# Patient Record
Sex: FEMALE | Race: WHITE | Employment: FULL TIME | URBAN - NONMETROPOLITAN AREA
[De-identification: names, ages, dates, MRNs, and addresses within clinical notes are randomized per-mention and may not be internally consistent; named-entity substitution may affect disease eponyms.]

---

## 2017-09-05 ENCOUNTER — TELEPHONE (OUTPATIENT)
Dept: NEUROLOGY | Age: 52
End: 2017-09-05

## 2017-10-03 ENCOUNTER — OFFICE VISIT (OUTPATIENT)
Dept: NEUROLOGY | Age: 52
End: 2017-10-03
Payer: COMMERCIAL

## 2017-10-03 ENCOUNTER — TELEPHONE (OUTPATIENT)
Dept: NEUROLOGY | Age: 52
End: 2017-10-03

## 2017-10-03 VITALS
HEART RATE: 74 BPM | WEIGHT: 201 LBS | DIASTOLIC BLOOD PRESSURE: 81 MMHG | BODY MASS INDEX: 35.61 KG/M2 | OXYGEN SATURATION: 99 % | HEIGHT: 63 IN | SYSTOLIC BLOOD PRESSURE: 125 MMHG

## 2017-10-03 DIAGNOSIS — H65.93 OTITIS MEDIA WITH EFFUSION, BILATERAL: ICD-10-CM

## 2017-10-03 DIAGNOSIS — R42 DIZZINESS: ICD-10-CM

## 2017-10-03 DIAGNOSIS — R55 VASOVAGAL SYNCOPE: Primary | ICD-10-CM

## 2017-10-03 PROBLEM — H65.90 OTITIS MEDIA WITH EFFUSION: Status: ACTIVE | Noted: 2017-10-03

## 2017-10-03 PROCEDURE — 99214 OFFICE O/P EST MOD 30 MIN: CPT | Performed by: PHYSICIAN ASSISTANT

## 2017-10-03 RX ORDER — RANITIDINE 150 MG/1
150 TABLET ORAL 2 TIMES DAILY
Qty: 60 TABLET | Refills: 11 | Status: SHIPPED | OUTPATIENT
Start: 2017-10-03 | End: 2018-11-02 | Stop reason: SDUPTHER

## 2017-10-03 RX ORDER — ATENOLOL 25 MG/1
25 TABLET ORAL DAILY
Qty: 30 TABLET | Refills: 11 | Status: SHIPPED | OUTPATIENT
Start: 2017-10-03 | End: 2018-11-02 | Stop reason: SDUPTHER

## 2017-10-03 NOTE — TELEPHONE ENCOUNTER
Franco Ramirez asked if she could have a prescription for the Claritin 5 mg rather than over the counter. It would be cheaper with her insurance that way. Thanks!   Dhaval Jeong

## 2017-10-03 NOTE — MR AVS SNAPSHOT
After Visit Summary             Nima Hunt   10/3/2017 10:45 AM   Office Visit    Description:  Female : 1965   Provider:  ZIYAD Cordero   Department:  [facility]              Your Follow-Up and Future Appointments         Below is a list of your follow-up and future appointments. This may not be a complete list as you may have made appointments directly with providers that we are not aware of or your providers may have made some for you. Please call your providers to confirm appointments. It is important to keep your appointments. Please bring your current insurance card, photo ID, co-pay, and all medication bottles to your appointment. If self-pay, payment is expected at the time of service. Your To-Do List     Future Appointments Provider Department Dept Phone    10/16/2018 8:30 AM ZIYAD Cordero [facility] Neuro & Sleep [phone]    Please arrive 15 minutes prior to appointment, bring photo ID and insurance card. Information from Your Visit        Department     Name Address Phone Fax    [address] Neuro & Sleep [address]  [place] [phone]      You Were Seen for:         Comments    Vasovagal syncope   [[id number]]         Vital Signs     Blood Pressure Pulse Height Weight Oxygen Saturation Body Mass Index    125/81 74 5' 3\" (1.6 m) 201 lb (91.2 kg) 99% 35.61 kg/m2    Smoking Status                   Never Smoker           Additional Information about your Body Mass Index (BMI)           Your BMI as listed above is considered obese (30 or more). BMI is an estimate of body fat, calculated from your height and weight. The higher your BMI, the greater your risk of heart disease, high blood pressure, type 2 diabetes, stroke, gallstones, arthritis, sleep apnea, and certain cancers. BMI is not perfect. It may overestimate body fat in athletes and people who are more muscular.   Even a small weight loss (between 5 and 10 · Talk to your doctor about any medicines you take. Some medicines may increase the chance of this condition occurring. · If you feel symptoms, lie down with your legs raised. Talk to your doctor about what to do if your symptoms come back. When should you call for help? Call 911 anytime you think you may need emergency care. For example, call if:  · You have symptoms of a heart problem. These may include:  ¨ Chest pain or pressure. ¨ Severe trouble breathing. ¨ A fast or irregular heartbeat. Watch closely for changes in your health, and be sure to contact your doctor if:  · You have more episodes of fainting at home. · You do not get better as expected. Where can you learn more? Go to [email or web address] and sign in to your [employer] account. Enter L754 in the [employer] box to learn more about \"Vasovagal Syncope: Care Instructions. \"     If you do not have an account, please click on the \"Sign Up Now\" link. Current as of: March 20, 2017  Content Version: 11.3  © 2682-5559 [employer]. Care instructions adapted under license by [facility] ([facility]). If you have questions about a medical condition or this instruction, always ask your healthcare professional. [address] any warranty or liability for your use of this information. Today's Medication Changes          These changes are accurate as of: 10/3/17 11:42 AM.  If you have any questions, ask your nurse or doctor. CHANGE how you take these medications           ranitidine 150 MG tablet   Commonly known as:  ZANTAC   Instructions:   Take 1 tablet by mouth 2 times daily   Quantity:  60 tablet   Refills:  11   What changed:  when to take this   Changed by:  ZIYAD Sandy            Where to Get Your Medications      These medications were sent to [facility], [address] [facility] [phone] - [phone]  [id number] [place] [address], [facility] [employer] Think allows you to send messages to your doctor, view your test results, renew your prescriptions, schedule appointments, view visit notes, and more. How Do I Sign Up? 1. In your Internet browser, go to [email or web address]  2. Click on the Sign Up Now link in the Sign In box. You will see the New Member Sign Up page. 3. Enter your Nano Think Access Code exactly as it appears below. You will not need to use this code after youve completed the sign-up process. If you do not sign up before the expiration date, you must request a new code. Nano Think Access Code: [email or web address]  Expires: 12/2/2017 11:42 AM    4. Enter your Social Security Number ([id number]) and Date of Birth (mm/dd/yyyy) as indicated and click Submit. You will be taken to the next sign-up page. 5. Create a Nano Think ID. This will be your Nano Think login ID and cannot be changed, so think of one that is secure and easy to remember. 6. Create a Nano Think password. You can change your password at any time. 7. Enter your Password Reset Question and Answer. This can be used at a later time if you forget your password. 8. Enter your e-mail address. You will receive e-mail notification when new information is available in [address]. 9. Click Sign Up. You can now view your medical record. Additional Information  If you have questions, please contact the physician practice where you receive care. Remember, Nano Think is NOT to be used for urgent needs. For medical emergencies, dial 911. For questions regarding your Nano Think account call [phone]. If you have a clinical question, please call your doctor's office.

## 2017-10-03 NOTE — PROGRESS NOTES
REVIEW OF SYSTEMS    Constitutional: []Fever [x]Sweats []Chills [] Recent Injury   [] Denies all unless marked  HENT:[]Headache  [] Head Injury  [] Sore Throat  [] Ear Pain  [x] Dizziness [] Hearing Loss   [] Denies all unless marked  Spine:  [] Neck pain  [] Back pain  [] Sciaticia  [x] Denies all unless marked  Cardiovascular:[]Chest Pain []Palpitations [] Heart Disease  [x] Denies all unless marked  Pulmonary: []Shortness of Breath []Cough   [x] Denies all unless marked  Gastrointestinal:  []Abdominal Pain  []Blood in Stool  []Diarrhea []Constipation [x]Nausea  []Vomiting  [] Denies all unless marked  Genitourinary:  [] Dysuria [] Frequency  [] Incontinence [] Urgency   [x] Denies all unless marked  Musculoskeletal: [] Arthralgia  [] Myalgias [] Muscle cramps  [x] Muscle twitches   [] Denies all unless marked   Extremities:   [] Pain   [x] Swelling   [] Denies all unless marked  Skin:[] Rash  [] Color Change  [x] Denies all unless marked  Neurological:[] Visual Disturbance [] Double Vision [] Slurred Speech [] Trouble swallowing  [] Vertigo [] Tingling [] Numbness [] Weakness [] Loss of Balance   [] Loss of Consciousness [] Memory Loss  [x] Denies all unless marked  Psychiatric/Behavioral:[] Depression [] Anxiety  [x] Denies all unless marked  Sleep: []  Insomnia [] Sleep Disturbance [] Snoring [] Restless Legs [] Daytime Sleepiness [] Sleep Apnea  [x] Denies all unless marked

## 2017-10-03 NOTE — PROGRESS NOTES
arrhythmia, CAD, CVA, DM, HTN, seizures or TIA. Objective:     Past Medical History:  Past Medical History:   Diagnosis Date    Acne     Asthma     GERD (gastroesophageal reflux disease)     Palpitations     Vasovagal syncope        Past Surgical History:   Procedure Laterality Date    CHOLECYSTECTOMY         Recent Hospitalizations  ·     Significant Injuries  ·     Family History   Problem Relation Age of Onset    Heart Disease Other     Cancer Other        Social History  Social History     Social History    Marital status:      Spouse name: N/A    Number of children: N/A    Years of education: N/A     Occupational History    Not on file. Social History Main Topics    Smoking status: Never Smoker    Smokeless tobacco: Never Used    Alcohol use No    Drug use: No    Sexual activity: Not on file     Other Topics Concern    Not on file     Social History Narrative       Medications:  Current Outpatient Prescriptions   Medication Sig Dispense Refill    atenolol (TENORMIN) 25 MG tablet Take 1 tablet by mouth daily 30 tablet 11    ranitidine (ZANTAC) 150 MG tablet Take 1 tablet by mouth 2 times daily 60 tablet 11    loratadine (CLARITIN REDITABS) 5 MG dissolvable tablet Take 5 mg by mouth every other day      albuterol sulfate (PROAIR RESPICLICK) 900 (90 BASE) MCG/ACT aerosol powder inhalation Inhale 2 puffs into the lungs every 6 hours as needed for Wheezing or Shortness of Breath 1 Inhaler 5     No current facility-administered medications for this visit.         Allergies:  No Known Allergies    REVIEW OF SYSTEMS     Constitutional: []Fever [x]Sweats []Chills [] Recent Injury   [] Denies all unless marked  HENT:[]Headache  [] Head Injury  [] Sore Throat  [] Ear Pain  [x] Dizziness [] Hearing Loss   [] Denies all unless marked  Spine:  [] Neck pain  [] Back pain  [] Sciaticia  [x] Denies all unless marked  Cardiovascular:[]Chest Pain []Palpitations [] Heart Disease  [x] Denies all unless marked  Pulmonary: []Shortness of Breath []Cough   [x] Denies all unless marked  Gastrointestinal:  []Abdominal Pain  []Blood in Stool  []Diarrhea []Constipation [x]Nausea  []Vomiting  [] Denies all unless marked  Genitourinary:  [] Dysuria [] Frequency  [] Incontinence [] Urgency   [x] Denies all unless marked  Musculoskeletal: [] Arthralgia  [] Myalgias [] Muscle cramps  [x] Muscle twitches   [] Denies all unless marked   Extremities:   [] Pain   [x] Swelling   [] Denies all unless marked  Skin:[] Rash  [] Color Change  [x] Denies all unless marked  Neurological:[] Visual Disturbance [] Double Vision [] Slurred Speech [] Trouble swallowing  [] Vertigo [] Tingling [] Numbness [] Weakness [] Loss of Balance   [] Loss of Consciousness [] Memory Loss  [x] Denies all unless marked  Psychiatric/Behavioral:[] Depression [] Anxiety  [x] Denies all unless marked  Sleep: []  Insomnia [] Sleep Disturbance [] Snoring [] Restless Legs [] Daytime Sleepiness [] Sleep Apnea  [x] Denies all unless marked    Examination:  Vitals:  /81  Pulse 74  Ht 5' 3\" (1.6 m)  Wt 201 lb (91.2 kg)  SpO2 99%  BMI 35.61 kg/m2  General appearance:  Appears healthy. Alert; in no acute distress. Pleasant. , alert and cooperative with exam  HEENT:  PERRLA, EOMI and Neck supple with midline trachea  Heart[de-identified]  regular rate and rhythm, S1, S2 normal, no murmur, click, rub or gallop  Lungs:  clear to auscultation bilaterally  Extremities:  extremities normal, atraumatic, no cyanosis or edema  Neurologic:  Extraocular movements are intact without nystagmus. Visual fields are full to confrontation. Facial movements are symmetrical and normal.  Speech is precise. Extremity strength is normal in both uppers and lowers. Deep tendon reflexes are intact and symmetrical.  Rapid alternating movements are unimpaired. Finger-to-nose testing is performed well, without dysmetria.   Gait is normal.    I reviewed the following

## 2017-10-03 NOTE — PATIENT INSTRUCTIONS
emergency care. For example, call if:  · You have symptoms of a heart problem. These may include:  ¨ Chest pain or pressure. ¨ Severe trouble breathing. ¨ A fast or irregular heartbeat. Watch closely for changes in your health, and be sure to contact your doctor if:  · You have more episodes of fainting at home. · You do not get better as expected. Where can you learn more? Go to [email or web address] (Mobile Payments)[email or web address] [email or web address]. org and sign in to your [employer] account. Enter L754 in the [employer] box to learn more about \"Vasovagal Syncope: Care Instructions. \"     If you do not have an account, please click on the \"Sign Up Now\" link. Current as of: March 20, 2017  Content Version: 11.3  © 9547-2069 [employer]. Care instructions adapted under license by [facility] ([facility]). If you have questions about a medical condition or this instruction, always ask your healthcare professional. [place] [address] any warranty or liability for your use of this information.

## 2017-10-04 RX ORDER — FLUTICASONE PROPIONATE 50 MCG
1 SPRAY, SUSPENSION (ML) NASAL DAILY
Qty: 1 BOTTLE | Refills: 11 | Status: SHIPPED | OUTPATIENT
Start: 2017-10-04 | End: 2018-11-02 | Stop reason: SDUPTHER

## 2017-11-03 ENCOUNTER — TELEPHONE (OUTPATIENT)
Dept: NEUROSURGERY | Age: 52
End: 2017-11-03

## 2017-11-03 NOTE — TELEPHONE ENCOUNTER
Patient stated that the medication for dizziness was not working and she feels that it is getting worse. What would you like to do?

## 2017-11-29 ENCOUNTER — APPOINTMENT (OUTPATIENT)
Dept: CT IMAGING | Age: 52
End: 2017-11-29
Payer: COMMERCIAL

## 2017-11-29 ENCOUNTER — HOSPITAL ENCOUNTER (EMERGENCY)
Age: 52
Discharge: HOME | End: 2017-11-29
Attending: EMERGENCY MEDICINE
Payer: COMMERCIAL

## 2017-11-29 ENCOUNTER — APPOINTMENT (OUTPATIENT)
Dept: GENERAL RADIOLOGY | Age: 52
End: 2017-11-29
Payer: COMMERCIAL

## 2017-11-29 VITALS
SYSTOLIC BLOOD PRESSURE: 109 MMHG | BODY MASS INDEX: 35.44 KG/M2 | HEART RATE: 77 BPM | TEMPERATURE: 98 F | DIASTOLIC BLOOD PRESSURE: 47 MMHG | HEIGHT: 63 IN | OXYGEN SATURATION: 95 % | RESPIRATION RATE: 18 BRPM | WEIGHT: 200 LBS

## 2017-11-29 DIAGNOSIS — R42 VERTIGO: Primary | ICD-10-CM

## 2017-11-29 DIAGNOSIS — R42 LIGHTHEADEDNESS: ICD-10-CM

## 2017-11-29 LAB
ALBUMIN SERPL-MCNC: 4.2 G/DL (ref 3.5–5.2)
ALP BLD-CCNC: 52 U/L (ref 35–104)
ALT SERPL-CCNC: 38 U/L (ref 5–33)
ANION GAP SERPL CALCULATED.3IONS-SCNC: 13 MMOL/L (ref 7–19)
AST SERPL-CCNC: 30 U/L (ref 5–32)
BASOPHILS ABSOLUTE: 0.1 K/UL (ref 0–0.2)
BASOPHILS RELATIVE PERCENT: 0.5 % (ref 0–1)
BILIRUB SERPL-MCNC: 0.3 MG/DL (ref 0.2–1.2)
BUN BLDV-MCNC: 14 MG/DL (ref 6–20)
CALCIUM SERPL-MCNC: 9.4 MG/DL (ref 8.6–10)
CHLORIDE BLD-SCNC: 99 MMOL/L (ref 98–111)
CO2: 27 MMOL/L (ref 22–29)
CREAT SERPL-MCNC: 0.6 MG/DL (ref 0.5–0.9)
EOSINOPHILS ABSOLUTE: 0.3 K/UL (ref 0–0.6)
EOSINOPHILS RELATIVE PERCENT: 3 % (ref 0–5)
GFR NON-AFRICAN AMERICAN: >60
GLUCOSE BLD-MCNC: 115 MG/DL (ref 74–109)
HCG QUALITATIVE: NEGATIVE
HCT VFR BLD CALC: 36.7 % (ref 37–47)
HEMOGLOBIN: 11.5 G/DL (ref 12–16)
LYMPHOCYTES ABSOLUTE: 2.6 K/UL (ref 1.1–4.5)
LYMPHOCYTES RELATIVE PERCENT: 23.4 % (ref 20–40)
MCH RBC QN AUTO: 26.3 PG (ref 27–31)
MCHC RBC AUTO-ENTMCNC: 31.3 G/DL (ref 33–37)
MCV RBC AUTO: 84 FL (ref 81–99)
MONOCYTES ABSOLUTE: 1.1 K/UL (ref 0–0.9)
MONOCYTES RELATIVE PERCENT: 10.4 % (ref 0–10)
NEUTROPHILS ABSOLUTE: 6.8 K/UL (ref 1.5–7.5)
NEUTROPHILS RELATIVE PERCENT: 62.2 % (ref 50–65)
PDW BLD-RTO: 14.4 % (ref 11.5–14.5)
PERFORMED ON: NORMAL
PLATELET # BLD: 336 K/UL (ref 130–400)
PMV BLD AUTO: 9 FL (ref 9.4–12.3)
POC TROPONIN I: 0 NG/ML (ref 0–0.08)
POTASSIUM SERPL-SCNC: 4.2 MMOL/L (ref 3.5–5)
RBC # BLD: 4.37 M/UL (ref 4.2–5.4)
SODIUM BLD-SCNC: 139 MMOL/L (ref 136–145)
TOTAL PROTEIN: 7.9 G/DL (ref 6.6–8.7)
WBC # BLD: 11 K/UL (ref 4.8–10.8)

## 2017-11-29 PROCEDURE — 36415 COLL VENOUS BLD VENIPUNCTURE: CPT

## 2017-11-29 PROCEDURE — 84703 CHORIONIC GONADOTROPIN ASSAY: CPT

## 2017-11-29 PROCEDURE — 6370000000 HC RX 637 (ALT 250 FOR IP): Performed by: EMERGENCY MEDICINE

## 2017-11-29 PROCEDURE — 85025 COMPLETE CBC W/AUTO DIFF WBC: CPT

## 2017-11-29 PROCEDURE — 99284 EMERGENCY DEPT VISIT MOD MDM: CPT | Performed by: EMERGENCY MEDICINE

## 2017-11-29 PROCEDURE — 93005 ELECTROCARDIOGRAM TRACING: CPT

## 2017-11-29 PROCEDURE — 70450 CT HEAD/BRAIN W/O DYE: CPT

## 2017-11-29 PROCEDURE — 2580000003 HC RX 258: Performed by: EMERGENCY MEDICINE

## 2017-11-29 PROCEDURE — 99284 EMERGENCY DEPT VISIT MOD MDM: CPT

## 2017-11-29 PROCEDURE — 84484 ASSAY OF TROPONIN QUANT: CPT

## 2017-11-29 PROCEDURE — 71010 XR CHEST PORTABLE: CPT

## 2017-11-29 PROCEDURE — 80053 COMPREHEN METABOLIC PANEL: CPT

## 2017-11-29 RX ORDER — DIAZEPAM 5 MG/1
5 TABLET ORAL ONCE
Status: COMPLETED | OUTPATIENT
Start: 2017-11-29 | End: 2017-11-29

## 2017-11-29 RX ORDER — MECLIZINE HCL 12.5 MG/1
25 TABLET ORAL ONCE
Status: COMPLETED | OUTPATIENT
Start: 2017-11-29 | End: 2017-11-29

## 2017-11-29 RX ORDER — 0.9 % SODIUM CHLORIDE 0.9 %
500 INTRAVENOUS SOLUTION INTRAVENOUS ONCE
Status: COMPLETED | OUTPATIENT
Start: 2017-11-29 | End: 2017-11-29

## 2017-11-29 RX ORDER — MECLIZINE HYDROCHLORIDE 25 MG/1
25 TABLET ORAL 3 TIMES DAILY PRN
Qty: 15 TABLET | Refills: 0 | Status: SHIPPED | OUTPATIENT
Start: 2017-11-29 | End: 2017-12-09

## 2017-11-29 RX ADMIN — SODIUM CHLORIDE 500 ML: 9 INJECTION, SOLUTION INTRAVENOUS at 20:56

## 2017-11-29 RX ADMIN — MECLIZINE 25 MG: 12.5 TABLET ORAL at 20:51

## 2017-11-29 RX ADMIN — DIAZEPAM 5 MG: 5 TABLET ORAL at 20:51

## 2017-11-29 ASSESSMENT — ENCOUNTER SYMPTOMS
NAUSEA: 0
DIARRHEA: 0
ABDOMINAL PAIN: 0
VOMITING: 0
SHORTNESS OF BREATH: 0
BACK PAIN: 0
COUGH: 0
RHINORRHEA: 0
SORE THROAT: 0

## 2017-12-01 LAB
EKG P AXIS: 62 DEGREES
EKG P-R INTERVAL: 194 MS
EKG Q-T INTERVAL: 388 MS
EKG QRS DURATION: 80 MS
EKG QTC CALCULATION (BAZETT): 424 MS
EKG T AXIS: 55 DEGREES

## 2017-12-04 ENCOUNTER — OFFICE VISIT (OUTPATIENT)
Dept: OTOLARYNGOLOGY | Age: 52
End: 2017-12-04
Payer: COMMERCIAL

## 2017-12-04 VITALS
OXYGEN SATURATION: 99 % | HEIGHT: 63 IN | SYSTOLIC BLOOD PRESSURE: 132 MMHG | WEIGHT: 200 LBS | DIASTOLIC BLOOD PRESSURE: 68 MMHG | RESPIRATION RATE: 20 BRPM | TEMPERATURE: 96.5 F | HEART RATE: 80 BPM | BODY MASS INDEX: 35.44 KG/M2

## 2017-12-04 DIAGNOSIS — H93.13 TINNITUS, BILATERAL: Primary | ICD-10-CM

## 2017-12-04 DIAGNOSIS — H91.90 DECREASED HEARING, UNSPECIFIED LATERALITY: ICD-10-CM

## 2017-12-04 DIAGNOSIS — R42 DIZZINESS: ICD-10-CM

## 2017-12-04 DIAGNOSIS — R55 VASOVAGAL SYNDROME: ICD-10-CM

## 2017-12-04 PROCEDURE — 99203 OFFICE O/P NEW LOW 30 MIN: CPT | Performed by: OTOLARYNGOLOGY

## 2017-12-04 NOTE — PROGRESS NOTES
response. Vestibular testing equipment is necessary for assessment of inner ear function and is unavailable at this institution. A trial of vestibular PT to assess efficacy is an option and a neurological consultation or neuro-otology evaluation are other means of assessment. Referral to an institution with a vestibular testing center recommended. Refer to [facility] for vestibular evaluation and further treatment recommendations if warranted. Audio for tinnitus. Review of Systems  A 12 point review of systems was completed, reviewed, and scanned to chart per staff. Patient medical history reviewed. Objective:     Physical Exam   Constitutional: She is oriented to person, place, and time. She appears well-developed and well-nourished. HENT:   Head: Normocephalic and atraumatic. Right Ear: Tympanic membrane, external ear and ear canal normal. No drainage. Decreased hearing is noted. Left Ear: Tympanic membrane, external ear and ear canal normal. No drainage. Decreased hearing is noted. Nose: Nose normal. No mucosal edema, rhinorrhea or septal deviation. Right sinus exhibits no maxillary sinus tenderness and no frontal sinus tenderness. Left sinus exhibits no maxillary sinus tenderness and no frontal sinus tenderness. Mouth/Throat: Uvula is midline and oropharynx is clear and moist. No oral lesions. Eyes: Conjunctivae and EOM are normal. Pupils are equal, round, and reactive to light. Neck: Normal range of motion. Neck supple. No tracheal deviation present. No thyromegaly present. Pulmonary/Chest: No stridor. Lymphadenopathy:     She has no cervical adenopathy. Neurological: She is alert and oriented to person, place, and time. No cranial nerve deficit. Coordination normal.   Psychiatric: She has a normal mood and affect. Her behavior is normal.   Nursing note and vitals reviewed.     /68   Pulse 80   Temp 96.5 °F (35.8 °C) (Temporal)   Resp 20   Ht 5' 3\"

## 2017-12-22 ENCOUNTER — TELEPHONE (OUTPATIENT)
Dept: GASTROENTEROLOGY | Age: 52
End: 2017-12-22

## 2018-01-26 ENCOUNTER — TELEPHONE (OUTPATIENT)
Dept: OTOLARYNGOLOGY | Age: 53
End: 2018-01-26

## 2018-02-02 NOTE — TELEPHONE ENCOUNTER
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

## 2018-02-05 ENCOUNTER — OFFICE VISIT (OUTPATIENT)
Dept: OTOLARYNGOLOGY | Age: 53
End: 2018-02-05
Payer: COMMERCIAL

## 2018-02-05 VITALS
RESPIRATION RATE: 16 BRPM | OXYGEN SATURATION: 99 % | BODY MASS INDEX: 35.08 KG/M2 | WEIGHT: 198 LBS | DIASTOLIC BLOOD PRESSURE: 84 MMHG | HEART RATE: 70 BPM | TEMPERATURE: 98 F | HEIGHT: 63 IN | SYSTOLIC BLOOD PRESSURE: 130 MMHG

## 2018-02-05 DIAGNOSIS — R42 DIZZINESS: ICD-10-CM

## 2018-02-05 DIAGNOSIS — R55 VASOVAGAL SYNDROME: ICD-10-CM

## 2018-02-05 DIAGNOSIS — H93.13 TINNITUS, BILATERAL: Primary | ICD-10-CM

## 2018-02-05 PROCEDURE — 99213 OFFICE O/P EST LOW 20 MIN: CPT | Performed by: OTOLARYNGOLOGY

## 2018-02-07 NOTE — TELEPHONE ENCOUNTER
Patient called wanting refills for atenolol. Called patient she doesn't need a refill. That she just needed to call the pharmacy and let them know she needs a refill.

## 2018-04-23 ENCOUNTER — HOSPITAL ENCOUNTER (OUTPATIENT)
Age: 53
Setting detail: OUTPATIENT SURGERY
Discharge: HOME | End: 2018-04-23
Attending: INTERNAL MEDICINE | Admitting: INTERNAL MEDICINE
Payer: COMMERCIAL

## 2018-04-23 ENCOUNTER — HOSPITAL ENCOUNTER (OUTPATIENT)
Age: 53
Setting detail: SPECIMEN
Discharge: HOME | End: 2018-04-23
Payer: COMMERCIAL

## 2018-04-23 ENCOUNTER — ANESTHESIA (OUTPATIENT)
Dept: OPERATING ROOM | Age: 53
End: 2018-04-23

## 2018-04-23 ENCOUNTER — ANESTHESIA EVENT (OUTPATIENT)
Dept: OPERATING ROOM | Age: 53
End: 2018-04-23

## 2018-04-23 VITALS
HEIGHT: 63 IN | TEMPERATURE: 99 F | SYSTOLIC BLOOD PRESSURE: 131 MMHG | WEIGHT: 200 LBS | DIASTOLIC BLOOD PRESSURE: 68 MMHG | BODY MASS INDEX: 35.44 KG/M2 | RESPIRATION RATE: 18 BRPM | HEART RATE: 64 BPM | OXYGEN SATURATION: 98 %

## 2018-04-23 VITALS — DIASTOLIC BLOOD PRESSURE: 54 MMHG | OXYGEN SATURATION: 99 % | SYSTOLIC BLOOD PRESSURE: 121 MMHG

## 2018-04-23 PROBLEM — Z80.0 FAMILY HISTORY OF COLON CANCER: Status: ACTIVE | Noted: 2018-04-23

## 2018-04-23 PROBLEM — Z12.11 SCREENING FOR COLON CANCER: Status: ACTIVE | Noted: 2018-04-23

## 2018-04-23 PROCEDURE — 45380 COLONOSCOPY AND BIOPSY: CPT

## 2018-04-23 PROCEDURE — 45380 COLONOSCOPY AND BIOPSY: CPT | Performed by: INTERNAL MEDICINE

## 2018-04-23 PROCEDURE — G8918 PT W/O PREOP ORDER IV AB PRO: HCPCS

## 2018-04-23 PROCEDURE — 88305 TISSUE EXAM BY PATHOLOGIST: CPT

## 2018-04-23 PROCEDURE — G8907 PT DOC NO EVENTS ON DISCHARG: HCPCS

## 2018-04-23 RX ORDER — SODIUM CHLORIDE 9 MG/ML
INJECTION, SOLUTION INTRAVENOUS CONTINUOUS
Status: DISCONTINUED | OUTPATIENT
Start: 2018-04-23 | End: 2018-04-23 | Stop reason: HOSPADM

## 2018-04-23 RX ORDER — LIDOCAINE HYDROCHLORIDE 10 MG/ML
INJECTION, SOLUTION EPIDURAL; INFILTRATION; INTRACAUDAL; PERINEURAL PRN
Status: DISCONTINUED | OUTPATIENT
Start: 2018-04-23 | End: 2018-04-23 | Stop reason: SDUPTHER

## 2018-04-23 RX ORDER — PROPOFOL 10 MG/ML
INJECTION, EMULSION INTRAVENOUS PRN
Status: DISCONTINUED | OUTPATIENT
Start: 2018-04-23 | End: 2018-04-23 | Stop reason: SDUPTHER

## 2018-04-23 RX ORDER — LIDOCAINE HYDROCHLORIDE 10 MG/ML
1 INJECTION, SOLUTION EPIDURAL; INFILTRATION; INTRACAUDAL; PERINEURAL
Status: DISCONTINUED | OUTPATIENT
Start: 2018-04-23 | End: 2018-04-23 | Stop reason: HOSPADM

## 2018-04-23 RX ADMIN — LIDOCAINE HYDROCHLORIDE 30 MG: 10 INJECTION, SOLUTION EPIDURAL; INFILTRATION; INTRACAUDAL; PERINEURAL at 08:09

## 2018-04-23 RX ADMIN — PROPOFOL 200 MG: 10 INJECTION, EMULSION INTRAVENOUS at 08:09

## 2018-04-23 RX ADMIN — SODIUM CHLORIDE: 9 INJECTION, SOLUTION INTRAVENOUS at 07:16

## 2018-05-23 PROBLEM — Z12.11 SCREENING FOR COLON CANCER: Status: RESOLVED | Noted: 2018-04-23 | Resolved: 2018-05-23

## 2018-10-08 ENCOUNTER — TELEPHONE (OUTPATIENT)
Dept: NEUROLOGY | Age: 53
End: 2018-10-08

## 2018-11-02 ENCOUNTER — OFFICE VISIT (OUTPATIENT)
Dept: NEUROLOGY | Age: 53
End: 2018-11-02
Payer: COMMERCIAL

## 2018-11-02 VITALS
WEIGHT: 200 LBS | BODY MASS INDEX: 35.44 KG/M2 | HEART RATE: 75 BPM | DIASTOLIC BLOOD PRESSURE: 75 MMHG | HEIGHT: 63 IN | OXYGEN SATURATION: 98 % | SYSTOLIC BLOOD PRESSURE: 134 MMHG

## 2018-11-02 DIAGNOSIS — J45.20 MILD INTERMITTENT ASTHMA WITHOUT COMPLICATION: ICD-10-CM

## 2018-11-02 DIAGNOSIS — K21.9 GASTROESOPHAGEAL REFLUX DISEASE WITHOUT ESOPHAGITIS: ICD-10-CM

## 2018-11-02 DIAGNOSIS — Z91.09 ENVIRONMENTAL ALLERGIES: ICD-10-CM

## 2018-11-02 DIAGNOSIS — R55 VASOVAGAL SYNCOPE: Primary | ICD-10-CM

## 2018-11-02 PROCEDURE — 99213 OFFICE O/P EST LOW 20 MIN: CPT | Performed by: PHYSICIAN ASSISTANT

## 2018-11-02 RX ORDER — ATENOLOL 25 MG/1
25 TABLET ORAL DAILY
Qty: 30 TABLET | Refills: 11 | Status: SHIPPED | OUTPATIENT
Start: 2018-11-02

## 2018-11-02 RX ORDER — FLUTICASONE PROPIONATE 50 MCG
SPRAY, SUSPENSION (ML) NASAL
Qty: 1 BOTTLE | Refills: 11 | Status: SHIPPED | OUTPATIENT
Start: 2018-11-02 | End: 2021-07-19

## 2018-11-02 RX ORDER — RANITIDINE 150 MG/1
150 TABLET ORAL 2 TIMES DAILY
Qty: 60 TABLET | Refills: 11 | Status: SHIPPED | OUTPATIENT
Start: 2018-11-02 | End: 2021-03-30

## 2018-11-02 NOTE — PROGRESS NOTES
Frbsnw-bgncbmbovmcsqb-JY x 2--5 yr recall    TONSILLECTOMY         Recent Hospitalizations  ·     Significant Injuries  ·     Family History   Problem Relation Age of Onset    Heart Disease Other     Cancer Other        Social History     Social History    Marital status:      Spouse name: N/A    Number of children: N/A    Years of education: N/A     Occupational History    Not on file. Social History Main Topics    Smoking status: Never Smoker    Smokeless tobacco: Never Used    Alcohol use No    Drug use: No    Sexual activity: Not on file     Other Topics Concern    Not on file     Social History Narrative    No narrative on file       Medications:  Current Outpatient Prescriptions   Medication Sig Dispense Refill    ranitidine (ZANTAC) 150 MG tablet Take 1 tablet by mouth 2 times daily 60 tablet 11    loratadine (CLARITIN CHILDRENS) 5 MG chewable tablet Take 1 tablet by mouth daily 30 tablet 11    fluticasone (FLONASE) 50 MCG/ACT nasal spray 1 spray q nostril qd 1 Bottle 11    atenolol (TENORMIN) 25 MG tablet Take 1 tablet by mouth daily 30 tablet 11    albuterol sulfate (PROAIR RESPICLICK) 091 (90 Base) MCG/ACT aerosol powder inhalation Inhale 2 puffs into the lungs every 6 hours as needed for Wheezing or Shortness of Breath 1 Inhaler 5     No current facility-administered medications for this visit.         Allergies:  No Known Allergies    REVIEW OF SYSTEMS     Constitutional: []Fever []Sweats []Chills [] Recent Injury   [x] Denies all unless marked  HENT:[]Headache  [] Head Injury  [] Sore Throat  [x] Ear Pain  [x] Dizziness [] Hearing Loss   [x] Denies all unless marked  Spine:  [] Neck pain  [] Back pain  [] Sciaticia  [x] Denies all unless marked  Cardiovascular:[]Chest Pain []Palpitations [] Heart Disease  [x] Denies all unless marked  Pulmonary: []Shortness of Breath []Cough   [x] Denies all unless marked  Gastrointestinal:  []Abdominal Pain  []Blood in Stool  []Diarrhea intact and symmetrical.  Rapid alternating movements are unimpaired. Finger-to-nose testing is performed well, without dysmetria. Gait is normal.      I reviewed the following studies:      []  :  Clinical laboratory test results    []  :  Radiology reports    []  :  Review and summarization of medical records and/or obtain medical records     []  :  Previous/recent polysomnogram report(s)    []  :  [place] Sleepiness Scale       No results found for: [id number]  Lab Results   Component Value Date    WBC 11.0 (H) 11/29/2017    HGB 11.5 (L) 11/29/2017    HCT 36.7 (L) 11/29/2017    MCV 84.0 11/29/2017     11/29/2017     Lab Results   Component Value Date     11/29/2017    K 4.2 11/29/2017    CL 99 11/29/2017    CO2 27 11/29/2017    BUN 14 11/29/2017    CREATININE 0.6 11/29/2017    GLUCOSE 115 (H) 11/29/2017    CALCIUM 9.4 11/29/2017    PROT 7.9 11/29/2017    LABALBU 4.2 11/29/2017    BILITOT 0.3 11/29/2017    ALKPHOS 52 11/29/2017    AST 30 11/29/2017    ALT 38 (H) 11/29/2017    LABGLOM >60 11/29/2017             Assessment:       ICD-10-CM    1. Vasovagal syncope R55    2. Mild intermittent asthma without complication O89.25    3. Environmental allergies Z91.09    4. Gastroesophageal reflux disease without esophagitis K21.9              [x]  :  Stable        []  :  Improved                          []  :  Well controlled                 []  :  Resolving        []  :  Resolved        []  :  Inadequately controlled        []  :  Worsening        []  :  Additional workup planned      Plan:   No orders of the defined types were placed in this encounter.     Orders Placed This Encounter   Medications    ranitidine (ZANTAC) 150 MG tablet     Sig: Take 1 tablet by mouth 2 times daily     Dispense:  60 tablet     Refill:  11    loratadine (CLARITIN CHILDRENS) 5 MG chewable tablet     Sig: Take 1 tablet by mouth daily     Dispense:  30 tablet     Refill:  11    fluticasone (FLONASE) 50 MCG/ACT nasal spray Si spray q nostril qd     Dispense:  1 Bottle     Refill:  11    atenolol (TENORMIN) 25 MG tablet     Sig: Take 1 tablet by mouth daily     Dispense:  30 tablet     Refill:  11    albuterol sulfate (PROAIR RESPICLICK) 962 (90 Base) MCG/ACT aerosol powder inhalation     Sig: Inhale 2 puffs into the lungs every 6 hours as needed for Wheezing or Shortness of Breath     Dispense:  1 Inhaler     Refill:  5         1. The following educational material has been included in this visit after visit summary for your review: vasovagal syncope-  Discussed with the patient and all questions fully answered. 2.  We had a discussion about the clinical issues and went over the various important aspects to consider. Treatment plan discussed. Discussed use, benefit, and SE of prescribed medication. All questions were answered. Pt voiced understanding and agrees with treatment plan. 3.  Refill medications  4. The current medical regimen is effective;  continue present plan and medications. 5.  Consider referral to GI to evaluate GERD; she plans to follow up with Dr. Enedina Romero  6. Follow up in 1 year      Note:  A total of >50% (>8 minutes) of 15 minutes was spent discussing the pathophysiology and treatment and/or coordination of care of the above diagnoses.

## 2018-11-02 NOTE — PATIENT INSTRUCTIONS
Patient Education        Vasovagal Syncope: Care Instructions  Your Care Instructions    Vasovagal syncope (say \"evf-swo-HPHSanya ELAM-kuh-pee\")is sudden dizziness or fainting that can be set off by things such as pain, stress, fear, or trauma. You may sweat or feel lightheaded, sick to your stomach, or tingly. The problem causes the heart rate to slow and the blood vessels to widen, or dilate, for a short time. When this happens, blood pools in the lower body, and less blood goes to the brain. You can usually get relief by lying down with your legs raised (elevated). This helps more blood to flow to your brain and may help relieve symptoms like feeling dizzy. Some doctors may recommend a technique that involves tensing your fists and arms. This type of fainting is often easy to predict. For example, it happens to some people when they see blood or have to get a shot. They may feel symptoms before they faint. An episode of vasovagal syncope usually responds well to self-care. Other treatment often isn't needed. But if the fainting keeps happening, your doctor may suggest further treatments. Follow-up care is a key part of your treatment and safety. Be sure to make and go to all appointments, and call your doctor if you are having problems. It's also a good idea to know your test results and keep a list of the medicines you take. How can you care for yourself at home? · Drink plenty of fluids to prevent dehydration. If you have kidney, heart, or liver disease and have to limit fluids, talk with your doctor before you increase your fluid intake. · Try to avoid things that you think may set off vasovagal syncope. · Talk to your doctor about any medicines you take. Some medicines may increase the chance of this condition occurring. · If you feel symptoms, lie down with your legs raised. Talk to your doctor about what to do if your symptoms come back. When should you call for help?   Call 911 anytime you think you may need emergency care. For example, call if:    · You have symptoms of a heart problem. These may include:  ¨ Chest pain or pressure. ¨ Severe trouble breathing. ¨ A fast or irregular heartbeat.    Watch closely for changes in your health, and be sure to contact your doctor if:    · You have more episodes of fainting at home.     · You do not get better as expected. Where can you learn more? Go to https://Advanced Currents CorporationpeFreshTeb.eMar. org and sign in to your [employer] account. Enter L754 in the [employer] box to learn more about \"Vasovagal Syncope: Care Instructions. \"     If you do not have an account, please click on the \"Sign Up Now\" link. Current as of: November 20, 2017  Content Version: 11.7  © 3854-1584 [employer]. Care instructions adapted under license by [employer]. If you have questions about a medical condition or this instruction, always ask your healthcare professional. [place] [address] any warranty or liability for your use of this information.

## 2020-08-24 NOTE — ED PROVIDER NOTES
(*)     MCHC 31.3 (*)     MPV 9.0 (*)     Monocytes % 10.4 (*)     Monocytes # 1.10 (*)     All other components within normal limits   COMPREHENSIVE METABOLIC PANEL - Abnormal; Notable for the following:     Glucose 115 (*)     ALT 38 (*)     All other components within normal limits   HCG, SERUM, QUALITATIVE   POCT TROPONIN   POCT VENOUS       All other labs were within normal range or not returned as of this dictation. EMERGENCY DEPARTMENT COURSE and DIFFERENTIAL DIAGNOSIS/MDM:   Vitals:    Vitals:    11/29/17 1932 11/29/17 2002 11/29/17 2032 11/29/17 2132   BP: 128/72 125/68 122/73 (!) 109/47   Pulse: 81 85 79 77   Resp:       Temp:       TempSrc:       SpO2:   97% 95%   Weight:       Height:           MDM  Number of Diagnoses or Management Options  Lightheadedness:   Vertigo:      Amount and/or Complexity of Data Reviewed  Clinical lab tests: ordered and reviewed  Tests in the radiology section of CPT®: ordered and reviewed  Independent visualization of images, tracings, or specimens: yes        Vital signs stable, alert oriented ×3, nonfocal neuro exam, some mild left lateral nystagmus on exam, presentation generally seems consistent with likely more peripheral vertigo, lab work so far unremarkable, patient also describes some positional lightheadedness however no chest pain or shortness of breath or palpitations, we'll proceed with checking CT head however low suspicion of central etiology, continue closely monitor    CT head negative, patient feeling better after treatment here, discussed primary care physician follow-up and return precautions, generally feel symptoms related to peripheral vertigo, patient agreeable with plan    CONSULTS:  None    PROCEDURES:  Unless otherwise noted below, none     Procedures    FINAL IMPRESSION      1. Vertigo    2.  Lightheadedness          DISPOSITION/PLAN   DISPOSITION     PATIENT REFERRED TO:  Lydia Anderson, DO  [address]  [address] [facility]

## 2021-03-19 ENCOUNTER — HOSPITAL ENCOUNTER (OUTPATIENT)
Dept: NON INVASIVE DIAGNOSTICS | Age: 56
Discharge: HOME | End: 2021-03-19
Payer: COMMERCIAL

## 2021-03-19 DIAGNOSIS — R00.2 PALPITATIONS: ICD-10-CM

## 2021-03-19 PROCEDURE — 93225 XTRNL ECG REC<48 HRS REC: CPT

## 2021-03-19 PROCEDURE — 93005 ELECTROCARDIOGRAM TRACING: CPT | Performed by: FAMILY MEDICINE

## 2021-03-19 PROCEDURE — 93226 XTRNL ECG REC<48 HR SCAN A/R: CPT

## 2021-03-21 LAB
EKG P AXIS: 66 DEGREES
EKG P-R INTERVAL: 210 MS
EKG Q-T INTERVAL: 414 MS
EKG QRS DURATION: 76 MS
EKG QTC CALCULATION (BAZETT): 426 MS
EKG T AXIS: 45 DEGREES

## 2021-03-21 PROCEDURE — 93010 ELECTROCARDIOGRAM REPORT: CPT | Performed by: INTERNAL MEDICINE

## 2021-03-24 PROCEDURE — 93227 XTRNL ECG REC<48 HR R&I: CPT | Performed by: INTERNAL MEDICINE

## 2021-03-25 NOTE — PROCEDURES
Holter monitor report    Date of recording 3/19/2021  Date of analysis 3/22/2021    Summary impressions:    1. Sinus rhythm minimal heart rate 52 average 69 maximal 101    2. No episodes of ST segment depression were recorded    3. Longest R to R interval 1.3 seconds    4. No ventricular ectopy was recorded    5. Rare supraventricular ectopy 0.4% with 3 runs longest 4 beats at a rate of 162    6. No episodes of atrial fibrillation were recorded    7. 18 seconds tachycardia fastest heart rate recorded 108    8. 49 minutes 9 seconds bradycardia slowest heart rate recorded 52    9.  Activities and symptoms included sitting at the desk working had slight dizziness at 0930 on 3/20/2021

## 2021-03-30 RX ORDER — OMEPRAZOLE 40 MG/1
40 CAPSULE, DELAYED RELEASE ORAL DAILY
COMMUNITY

## 2021-04-02 ENCOUNTER — OFFICE VISIT (OUTPATIENT)
Dept: GASTROENTEROLOGY | Age: 56
End: 2021-04-02
Payer: COMMERCIAL

## 2021-04-02 ENCOUNTER — TELEPHONE (OUTPATIENT)
Dept: GASTROENTEROLOGY | Age: 56
End: 2021-04-02

## 2021-04-02 VITALS
HEART RATE: 76 BPM | WEIGHT: 208 LBS | SYSTOLIC BLOOD PRESSURE: 135 MMHG | OXYGEN SATURATION: 99 % | DIASTOLIC BLOOD PRESSURE: 70 MMHG | HEIGHT: 63 IN | BODY MASS INDEX: 36.86 KG/M2

## 2021-04-02 DIAGNOSIS — R74.01 TRANSAMINITIS: Primary | ICD-10-CM

## 2021-04-02 DIAGNOSIS — R12 HEARTBURN: ICD-10-CM

## 2021-04-02 DIAGNOSIS — R14.2 BELCHING: ICD-10-CM

## 2021-04-02 DIAGNOSIS — K76.0 FATTY LIVER: Primary | ICD-10-CM

## 2021-04-02 DIAGNOSIS — R74.01 TRANSAMINITIS: ICD-10-CM

## 2021-04-02 PROCEDURE — 99214 OFFICE O/P EST MOD 30 MIN: CPT | Performed by: NURSE PRACTITIONER

## 2021-04-02 RX ORDER — FAMOTIDINE 20 MG/1
20 TABLET, FILM COATED ORAL NIGHTLY
Qty: 90 TABLET | Refills: 3 | Status: SHIPPED | OUTPATIENT
Start: 2021-04-02 | End: 2021-10-01

## 2021-04-02 ASSESSMENT — ENCOUNTER SYMPTOMS
TROUBLE SWALLOWING: 0
RECTAL PAIN: 0
ABDOMINAL PAIN: 0
VOMITING: 0
VOICE CHANGE: 0
BLOOD IN STOOL: 0
CONSTIPATION: 0
DIARRHEA: 0
BACK PAIN: 0
NAUSEA: 0
SORE THROAT: 0
COUGH: 0
ANAL BLEEDING: 0
ABDOMINAL DISTENTION: 0
SHORTNESS OF BREATH: 0

## 2021-04-02 NOTE — TELEPHONE ENCOUNTER
----- Message from Aisha Way sent at 4/2/2021  2:56 PM CDT -----  Regarding: CHECKOUT NOTE 4/2/2021  Referral to dietician for fatty liver(appt in late June 2021) PER [facility]

## 2021-04-02 NOTE — TELEPHONE ENCOUNTER
4-2-21    Faxed ref notes to Saym De León [phone]. Once received it will go to review, then pt will be call to schedule. Faxed confirmation attached to this encounter.

## 2021-04-02 NOTE — PROGRESS NOTES
Subjective:      Claudia Chandler is a52 y.o. female  Chief Complaint   Patient presents with    Abnormal Lab     from PCP       HPI  PCP: Alta Escoto DO  Referring Provider: Calixto Alexandre pt referral.  From PCP. For transaminitis. Liver US 3/2021 Radha Hernandez) from PCP notes fatty liver, otherwise normal.  Labs 3/2021:   (<32),  (<33), alk phos normal, total bili normal  Labs 11/2020:  AST 30, ALT normal, alk phos normal, total bili normal  Labs 4/2020:  AST 36, ALT 53, alk phos normal, total bili normal  Pt denies any ETOH use. No familial hx of liver disorders. No recent fatigue or viral illnesses. No herbal supplements. No recent antifungals or antibiotics. Pt reports she has been advised to \"eat healthy\" but she doesn't know what this means and what to eat/avoid in regards to her fatty liver and trying to lose weight. C/o belching. Chronic for >5 years. With acid reflux. Was started on prilosec 40mg daily by her PCP about a year ago. This has helped, but still has breakthrough heartburn almost daily. Has not tried any other PPIs or addition of H2 blockers. Admits that she isnt waiting 30 minutes after taking it before she eats/drinks. No further GI complaints. GI scope reports in history per MA per OV policy, I reviewed this. Family HX:  Maternal grandmother had colon cancer; maternal aunt had colon cancer  Pt denies family hx of colon polyps, inflammatory bowel dx, gastric CA and esophageal CA.     Past Medical History:   Diagnosis Date    Acne     Allergic rhinitis     Asthma     due to allergies    GERD (gastroesophageal reflux disease)     Hypertension     Palpitations     Vasovagal syncope           Past Surgical History:   Procedure Laterality Date    ADENOIDECTOMY      CHOLECYSTECTOMY      COLONOSCOPY      COLONOSCOPY N/A 4/23/2018    Dr HuizarFccaja-ndnnwngbydstah-[facility] x 2--5 yr recall    TONSILLECTOMY         Social History Socioeconomic History    Marital status:      Spouse name: None    Number of children: None    Years of education: None    Highest education level: None   Occupational History    None   Social Needs    Financial resource strain: None    Food insecurity     Worry: None     Inability: None    Transportation needs     Medical: None     Non-medical: None   Tobacco Use    Smoking status: Never Smoker    Smokeless tobacco: Never Used   Substance and Sexual Activity    Alcohol use: No     Alcohol/week: 0.0 standard drinks    Drug use: No    Sexual activity: None   Lifestyle    Physical activity     Days per week: None     Minutes per session: None    Stress: None   Relationships    Social connections     Talks on phone: None     Gets together: None     Attends Mu-ism service: None     Active member of club or organization: None     Attends meetings of clubs or organizations: None     Relationship status: None    Intimate partner violence     Fear of current or ex partner: None     Emotionally abused: None     Physically abused: None     Forced sexual activity: None   Other Topics Concern    None   Social History Narrative    None       No Known Allergies    Current Outpatient Medications   Medication Sig Dispense Refill    famotidine (PEPCID) 20 MG tablet Take 1 tablet by mouth nightly 90 tablet 3    omeprazole (PRILOSEC) 40 MG delayed release capsule Take 40 mg by mouth daily      loratadine (CLARITIN CHILDRENS) 5 MG chewable tablet Take 1 tablet by mouth daily 30 tablet 11    fluticasone (FLONASE) 50 MCG/ACT nasal spray 1 spray q nostril qd 1 Bottle 11    atenolol (TENORMIN) 25 MG tablet Take 1 tablet by mouth daily 30 tablet 11    albuterol sulfate (PROAIR RESPICLICK) 532 (90 Base) MCG/ACT aerosol powder inhalation Inhale 2 puffs into the lungs every 6 hours as needed for Wheezing or Shortness of Breath 1 Inhaler 5     No current facility-administered medications for this visit. Review of Systems   Constitutional: Negative for appetite change, fatigue, fever and unexpected weight change. HENT: Negative for sore throat, trouble swallowing and voice change. Respiratory: Negative for cough and shortness of breath. Cardiovascular: Negative for chest pain, palpitations and leg swelling. Gastrointestinal: Negative for abdominal distention, abdominal pain, anal bleeding, blood in stool, constipation, diarrhea, nausea, rectal pain and vomiting. Genitourinary: Negative for hematuria. Musculoskeletal: Negative for arthralgias, back pain and neck pain. Neurological: Negative for dizziness, weakness, light-headedness and headaches. Psychiatric/Behavioral: Negative for dysphoric mood and sleep disturbance. The patient is not nervous/anxious. All other systems reviewed and are negative. Objective:     Physical Exam  Vitals signs and nursing note reviewed. Constitutional:       Appearance: She is well-developed. Comments: /70   Pulse 76   Ht 5' 3\" (1.6 m)   Wt 208 lb (94.3 kg)   SpO2 99%   BMI 36.85 kg/m²    Eyes:      General: No scleral icterus. Conjunctiva/sclera: Conjunctivae normal.      Pupils: Pupils are equal, round, and reactive to light. Neck:      Musculoskeletal: Normal range of motion and neck supple. Thyroid: No thyromegaly. Cardiovascular:      Rate and Rhythm: Normal rate and regular rhythm. Heart sounds: Normal heart sounds. No murmur. No friction rub. No gallop. Pulmonary:      Effort: Pulmonary effort is normal. No respiratory distress. Breath sounds: Normal breath sounds. Abdominal:      General: Bowel sounds are normal. There is no distension. Palpations: Abdomen is soft. Tenderness: There is no abdominal tenderness. There is no rebound. Musculoskeletal: Normal range of motion. General: No deformity. Neurological:      Mental Status: She is alert and oriented to person, place, and time.

## 2021-04-02 NOTE — PATIENT INSTRUCTIONS
You are going to have an Endoscopy and here are some basic instructions:    Nothing to eat or drink after midnight EXCEPT:  PLEASE TAKE MEDICATION(S) FOR HIGH BLOOD PRESSURE, SEIZURES, HEART, AND THYROID WITH A SIP OF WATER AT LEAST 2 HOURS PRIOR TO ARRIVAL TIME.   YOU MAY ALSO TAKE ANY INHALERS YOU ARE PRESCRIBED. You will not be able to drive for 24 hours after the procedure due to sedation. Bring a  with you the day of the procedure. No aspirin, ibuprofen, naproxen, fish oil or vitamin E for 5 days before procedure. Continue current medications. If you are on blood thinners, clearance from the prescribing physician will be obtained before your procedure is scheduled. Increased [email or web address] may be associated with discontinuation of your blood thinner and include, but not limited to, stroke, TIA, or cardiac event. If biopsies are taken during the procedure they will be sent to a pathologist for analysis. You will be notified by mail of the pathology results in 2-3 weeks. Your physician may also schedule a follow up appointment with the nurse practitioner to discuss pathology, symptoms or to check if you have had any problems related to your procedure. If you prefer not to return to the office after your procedure please discuss this with your physician on the day of your procedure.

## 2021-04-05 ENCOUNTER — TRANSCRIBE ORDERS (OUTPATIENT)
Dept: ADMINISTRATIVE | Facility: HOSPITAL | Age: 56
End: 2021-04-05

## 2021-04-05 DIAGNOSIS — K76.0 FATTY LIVER: Primary | ICD-10-CM

## 2021-04-08 LAB
ALBUMIN SERPL-MCNC: 4.3 G/DL (ref 3.5–5.2)
ALP BLD-CCNC: 71 U/L (ref 35–104)
ALT SERPL-CCNC: 98 U/L (ref 5–33)
ANION GAP SERPL CALCULATED.3IONS-SCNC: 13 MMOL/L (ref 7–19)
AST SERPL-CCNC: 78 U/L (ref 5–32)
BASOPHILS ABSOLUTE: 0 K/UL (ref 0–0.2)
BASOPHILS RELATIVE PERCENT: 0.4 % (ref 0–1)
BILIRUB SERPL-MCNC: 0.3 MG/DL (ref 0.2–1.2)
BILIRUBIN DIRECT: 0.1 MG/DL (ref 0–0.3)
BILIRUBIN, INDIRECT: 0.2 MG/DL (ref 0.1–1)
BUN BLDV-MCNC: 14 MG/DL (ref 6–20)
CALCIUM SERPL-MCNC: 9.3 MG/DL (ref 8.6–10)
CHLORIDE BLD-SCNC: 102 MMOL/L (ref 98–111)
CO2: 26 MMOL/L (ref 22–29)
CREAT SERPL-MCNC: 0.7 MG/DL (ref 0.5–0.9)
EOSINOPHILS ABSOLUTE: 0.2 K/UL (ref 0–0.6)
EOSINOPHILS RELATIVE PERCENT: 1.8 % (ref 0–5)
FERRITIN: 174.6 NG/ML (ref 13–150)
GAMMA GLUTAMYL TRANSFERASE: 23 U/L (ref 7–54)
GFR AFRICAN AMERICAN: >59
GFR NON-AFRICAN AMERICAN: >60
GLUCOSE BLD-MCNC: 136 MG/DL (ref 74–109)
HAV IGM SER IA-ACNC: NORMAL
HCT VFR BLD CALC: 40.6 % (ref 37–47)
HEMOGLOBIN: 12.8 G/DL (ref 12–16)
HEPATITIS B CORE IGM ANTIBODY: NORMAL
HEPATITIS B SURFACE ANTIGEN INTERPRETATION: NORMAL
HEPATITIS C ANTIBODY INTERPRETATION: NORMAL
IMMATURE GRANULOCYTES #: 0.1 K/UL
IRON SATURATION: 14 % (ref 14–50)
IRON: 53 UG/DL (ref 37–145)
LYMPHOCYTES ABSOLUTE: 3.2 K/UL (ref 1.1–4.5)
LYMPHOCYTES RELATIVE PERCENT: 31.5 % (ref 20–40)
MCH RBC QN AUTO: 26.9 PG (ref 27–31)
MCHC RBC AUTO-ENTMCNC: 31.5 G/DL (ref 33–37)
MCV RBC AUTO: 85.3 FL (ref 81–99)
MONOCYTES ABSOLUTE: 1 K/UL (ref 0–0.9)
MONOCYTES RELATIVE PERCENT: 9.6 % (ref 0–10)
NEUTROPHILS ABSOLUTE: 5.7 K/UL (ref 1.5–7.5)
NEUTROPHILS RELATIVE PERCENT: 56.2 % (ref 50–65)
PDW BLD-RTO: 13.5 % (ref 11.5–14.5)
PLATELET # BLD: 301 K/UL (ref 130–400)
PMV BLD AUTO: 9.6 FL (ref 9.4–12.3)
POTASSIUM SERPL-SCNC: 4.2 MMOL/L (ref 3.5–5)
RBC # BLD: 4.76 M/UL (ref 4.2–5.4)
SODIUM BLD-SCNC: 141 MMOL/L (ref 136–145)
TOTAL IRON BINDING CAPACITY: 387 UG/DL (ref 250–400)
TOTAL PROTEIN: 7.7 G/DL (ref 6.6–8.7)
WBC # BLD: 10.1 K/UL (ref 4.8–10.8)

## 2021-04-09 ENCOUNTER — HOSPITAL ENCOUNTER (OUTPATIENT)
Dept: NON INVASIVE DIAGNOSTICS | Age: 56
Discharge: HOME | End: 2021-04-09
Payer: COMMERCIAL

## 2021-04-09 PROCEDURE — 93005 ELECTROCARDIOGRAM TRACING: CPT | Performed by: FAMILY MEDICINE

## 2021-04-10 LAB — ALPHA-1 ANTITRYPSIN: 171 MG/DL (ref 90–200)

## 2021-04-11 LAB
ANTINUCLEAR AB INTERPRETIVE COMMENT: NORMAL
ANTINUCLEAR ANTIBODY, HEP-2, IGG: NORMAL
EKG P AXIS: 62 DEGREES
EKG P-R INTERVAL: 206 MS
EKG Q-T INTERVAL: 408 MS
EKG QRS DURATION: 80 MS
EKG QTC CALCULATION (BAZETT): 427 MS
EKG T AXIS: 41 DEGREES
F-ACTIN AB IGG: 9 UNITS (ref 0–19)
MITOCHONDRIAL M2 AB, IGG: 4.7 UNITS (ref 0–24.9)

## 2021-04-11 PROCEDURE — 93010 ELECTROCARDIOGRAM REPORT: CPT | Performed by: INTERNAL MEDICINE

## 2021-04-13 LAB — CERULOPLASMIN: 29 MG/DL (ref 16–45)

## 2021-04-14 ENCOUNTER — TELEPHONE (OUTPATIENT)
Dept: GASTROENTEROLOGY | Age: 56
End: 2021-04-14

## 2021-04-14 NOTE — TELEPHONE ENCOUNTER
Please let pt know I am reviewing labs as they are coming in. Her liver enzymes have come down quite a bit compared to previous. She has a f/u appt to see me back to discuss results.  Have her get a repeat HFP a few days before her appt so I can see what they are. thanks

## 2021-04-14 NOTE — TELEPHONE ENCOUNTER
4-14-21  Pt has been notified by voicemail.   Person reminder put in to repeat HFP prior to appt on 5-18-21

## 2021-05-10 ENCOUNTER — TELEPHONE (OUTPATIENT)
Dept: GASTROENTEROLOGY | Age: 56
End: 2021-05-10

## 2021-05-10 DIAGNOSIS — R74.01 TRANSAMINITIS: ICD-10-CM

## 2021-05-10 DIAGNOSIS — K76.0 FATTY LIVER: Primary | ICD-10-CM

## 2021-05-12 DIAGNOSIS — R74.01 TRANSAMINITIS: ICD-10-CM

## 2021-05-12 DIAGNOSIS — K76.0 FATTY LIVER: ICD-10-CM

## 2021-05-12 LAB
ALBUMIN SERPL-MCNC: 4.3 G/DL (ref 3.5–5.2)
ALP BLD-CCNC: 64 U/L (ref 35–104)
ALT SERPL-CCNC: 92 U/L (ref 5–33)
AST SERPL-CCNC: 71 U/L (ref 5–32)
BILIRUB SERPL-MCNC: 0.4 MG/DL (ref 0.2–1.2)
BILIRUBIN DIRECT: 0.2 MG/DL (ref 0–0.3)
BILIRUBIN, INDIRECT: 0.2 MG/DL (ref 0.1–1)
TOTAL PROTEIN: 7.9 G/DL (ref 6.6–8.7)

## 2021-05-19 ENCOUNTER — OFFICE VISIT (OUTPATIENT)
Dept: GASTROENTEROLOGY | Age: 56
End: 2021-05-19
Payer: COMMERCIAL

## 2021-05-19 VITALS
OXYGEN SATURATION: 98 % | SYSTOLIC BLOOD PRESSURE: 120 MMHG | HEART RATE: 74 BPM | DIASTOLIC BLOOD PRESSURE: 68 MMHG | WEIGHT: 212.6 LBS | BODY MASS INDEX: 37.67 KG/M2 | HEIGHT: 63 IN

## 2021-05-19 DIAGNOSIS — K76.0 FATTY LIVER: ICD-10-CM

## 2021-05-19 DIAGNOSIS — R74.01 TRANSAMINITIS: Primary | ICD-10-CM

## 2021-05-19 PROCEDURE — 99213 OFFICE O/P EST LOW 20 MIN: CPT | Performed by: NURSE PRACTITIONER

## 2021-05-19 ASSESSMENT — ENCOUNTER SYMPTOMS
TROUBLE SWALLOWING: 0
VOICE CHANGE: 0
DIARRHEA: 0
ABDOMINAL DISTENTION: 0
BLOOD IN STOOL: 0
COUGH: 0
BACK PAIN: 0
NAUSEA: 0
RECTAL PAIN: 0
CONSTIPATION: 0
ANAL BLEEDING: 0
SHORTNESS OF BREATH: 0
VOMITING: 0
ABDOMINAL PAIN: 0

## 2021-05-19 NOTE — PROGRESS NOTES
Subjective:      Tunde Crenshaw is a52 y.o. female  Chief Complaint   Patient presents with    Follow-up       HPI  PCP: Donnie Hansen DO  Pt made a f/u appt. To discuss results of labs for evaluation of transaminitis. Results in epic. Full panel of labs 4/2021 fail to reveal any indication of autoimmune, hereditary, or viral hepatitis. AST and ALT have trended downward. Most recent labs note AST of 92, ALT of 71, total bili normal and alk phos normal.  She has known fatty liver as evidenced by recent liver US at [address] [employer]. She has an appt with a dietician next month to discuss low fat diet and fatty liver. Family HX:  Maternal aunt and maternal grandmother had colon cancer  Pt denies family hx of colon polyps, inflammatory bowel dx, gastric CA and esophageal CA.     Past Medical History:   Diagnosis Date    Acne     Allergic rhinitis     Asthma     due to allergies    GERD (gastroesophageal reflux disease)     Hypertension     Palpitations     Vasovagal syncope           Past Surgical History:   Procedure Laterality Date    ADENOIDECTOMY      CHOLECYSTECTOMY      COLONOSCOPY      COLONOSCOPY N/A 4/23/2018    Dr HuizarSbvrwy-yezncauikrhaeg-[facility] x 2--5 yr recall    TONSILLECTOMY         Social History     Socioeconomic History    Marital status:      Spouse name: None    Number of children: None    Years of education: None    Highest education level: None   Occupational History    None   Tobacco Use    Smoking status: Never Smoker    Smokeless tobacco: Never Used   Vaping Use    Vaping Use: Never used   Substance and Sexual Activity    Alcohol use: No     Alcohol/week: 0.0 standard drinks    Drug use: No    Sexual activity: None   Other Topics Concern    None   Social History Narrative    None     Social Determinants of Health     Financial Resource Strain:     Difficulty of Paying Living Expenses:    Food Insecurity:     Worried About Running Out of Food in the Last Year:  Ran Out of Food in the Last Year:    Transportation Needs:     Lack of Transportation (Medical):  Lack of Transportation (Non-Medical):    Physical Activity:     Days of Exercise per Week:     Minutes of Exercise per Session:    Stress:     Feeling of Stress :    Social Connections:     Frequency of Communication with Friends and Family:     Frequency of Social Gatherings with Friends and Family:     Attends Yarsani Services:     Active Member of Clubs or Organizations:     Attends Club or Organization Meetings:     Marital Status:    Intimate Partner Violence:     Fear of Current or Ex-Partner:     Emotionally Abused:     Physically Abused:     Sexually Abused:        No Known Allergies    Current Outpatient Medications   Medication Sig Dispense Refill    famotidine (PEPCID) 20 MG tablet Take 1 tablet by mouth nightly 90 tablet 3    omeprazole (PRILOSEC) 40 MG delayed release capsule Take 40 mg by mouth daily      loratadine (CLARITIN CHILDRENS) 5 MG chewable tablet Take 1 tablet by mouth daily 30 tablet 11    atenolol (TENORMIN) 25 MG tablet Take 1 tablet by mouth daily 30 tablet 11    albuterol sulfate (PROAIR RESPICLICK) 816 (90 Base) MCG/ACT aerosol powder inhalation Inhale 2 puffs into the lungs every 6 hours as needed for Wheezing or Shortness of Breath 1 Inhaler 5    fluticasone (FLONASE) 50 MCG/ACT nasal spray 1 spray q nostril qd (Patient not taking: Reported on 5/19/2021) 1 Bottle 11     No current facility-administered medications for this visit. Review of Systems   Constitutional: Negative for fatigue and unexpected weight change. HENT: Negative for trouble swallowing and voice change. Respiratory: Negative for cough and shortness of breath. Cardiovascular: Negative for chest pain and palpitations. Gastrointestinal: Negative for abdominal distention, abdominal pain, anal bleeding, blood in stool, constipation, diarrhea, nausea, rectal pain and vomiting.

## 2021-05-19 NOTE — PATIENT INSTRUCTIONS
We will recheck your liver enzymes in 6 months    Recommendations for Fatty Liver disease    Here are a few modifications you can make to your diet and lifestyle:   Consume fiber rich foods as much as possible.  Eat until you are no longer hungry but not until you are full.  Increase the frequency of your consumption of white meats.  Eat fresh fruits.  Eat green vegetables daily in addition to other vegetables.  Regularly eat foods like brown rice because they have complex carbohydrates which are good for you. Avoiding some foods and beverages can help reduce the progression of fatty liver disease. Here are some of the things you should avoid:   alcoholic beverages    soda, fruit juice that is not 100% fruit juice and all other high sugar beverages    dark meats    fried foods    simple carb rich foods such as candy, and sweets    saturated fats should be avoided whenever possible     A fatty liver diet plan needs to be strict in order to reduce the possibility of further damage to the liver and other organs. The sacrifices as far as food and beverages are concerned are minimal when you consider the risks if these sacrifices are not made. Recommendation for consultation with dietician may be made and is a very important part of your care. In addition to eating right, exercising regularly and gradual weight reduction will increase your overall health as well as improve your livers health. If you are overweight you should lose 10% of your total body weight.

## 2021-05-20 ENCOUNTER — APPOINTMENT (OUTPATIENT)
Dept: NUTRITION | Facility: HOSPITAL | Age: 56
End: 2021-05-20

## 2021-06-18 NOTE — TELEPHONE ENCOUNTER
6/18/21    S/w Michelle, she said she had to CX the referral appt. Due to work. She will call them back when she gets her schedule Monday 21st.    I asked for a call back when she got the appt scheduled. She voiced understanding.

## 2021-07-14 ENCOUNTER — HOSPITAL ENCOUNTER (OUTPATIENT)
Dept: ULTRASOUND IMAGING | Age: 56
Discharge: HOME | End: 2021-07-14
Payer: COMMERCIAL

## 2021-07-14 ENCOUNTER — TELEPHONE (OUTPATIENT)
Dept: OBGYN CLINIC | Age: 56
End: 2021-07-14

## 2021-07-14 DIAGNOSIS — N95.0 POST-MENOPAUSE BLEEDING: ICD-10-CM

## 2021-07-14 PROCEDURE — 76830 TRANSVAGINAL US NON-OB: CPT

## 2021-07-14 NOTE — TELEPHONE ENCOUNTER
[facility] called to schedule a new pt. appt. in re: to post menopausal bleeding. Records are being faxed. Please be advised that the best time to call her to accommodate their needs is Anytime. Thank you.

## 2021-07-19 ENCOUNTER — OFFICE VISIT (OUTPATIENT)
Dept: OBGYN CLINIC | Age: 56
End: 2021-07-19
Payer: COMMERCIAL

## 2021-07-19 VITALS
BODY MASS INDEX: 36.86 KG/M2 | SYSTOLIC BLOOD PRESSURE: 136 MMHG | HEIGHT: 63 IN | DIASTOLIC BLOOD PRESSURE: 78 MMHG | WEIGHT: 208 LBS

## 2021-07-19 DIAGNOSIS — N95.0 ABNORMAL VAGINAL BLEEDING WITH ENDOMETRIAL THICKNESS GREATER THAN 5 MM PRESENT ON TRANSVAGINAL ULTRASOUND IN POSTMENOPAUSAL PATIENT: ICD-10-CM

## 2021-07-19 DIAGNOSIS — Z76.89 ENCOUNTER TO ESTABLISH CARE: Primary | ICD-10-CM

## 2021-07-19 DIAGNOSIS — R93.89 ABNORMAL VAGINAL BLEEDING WITH ENDOMETRIAL THICKNESS GREATER THAN 5 MM PRESENT ON TRANSVAGINAL ULTRASOUND IN POSTMENOPAUSAL PATIENT: ICD-10-CM

## 2021-07-19 DIAGNOSIS — N95.0 POSTMENOPAUSAL BLEEDING: ICD-10-CM

## 2021-07-19 PROCEDURE — 99204 OFFICE O/P NEW MOD 45 MIN: CPT | Performed by: NURSE PRACTITIONER

## 2021-07-19 PROCEDURE — 58100 BIOPSY OF UTERUS LINING: CPT | Performed by: NURSE PRACTITIONER

## 2021-07-19 ASSESSMENT — ENCOUNTER SYMPTOMS
EYES NEGATIVE: 1
CONSTIPATION: 0
DIARRHEA: 0
ALLERGIC/IMMUNOLOGIC NEGATIVE: 1
RESPIRATORY NEGATIVE: 1
GASTROINTESTINAL NEGATIVE: 1

## 2021-07-19 NOTE — PROGRESS NOTES
Sheela Adames is a 54 y.o. female who presents today for her medical conditions/ complaints as noted below. Sheela Adames is c/o of New Patient and Menorrhagia (PMB)        HPI  New pt presents to establish care and having PMB. Started having a heavy period with clotting last week. Still bleeding, but not as heavy. Having some cramping. Was going through about a pad an hour and felt tired. Has been 2 years without a period. No change in medicines or steroids recently. Had TVUS showing endo ling 12mm-18mm and echogenic debris. EXAMINATION: US NON OB TRANSVAGINAL 7/14/2021 4:20 PM   HISTORY: Ultrasound pelvis, transvaginal 7/14/2021 2:43 PM   REASON FOR EXAM: N95.0     COMPARISON: None    TECHNIQUE: Multiple longitudinal and transverse real-time sonographic   images of the pelvis are obtained using an intravaginal transducer. FINDINGS:     UTERUS: Anteflexed in position and measures 5 x 5.8 x 7.7 cm. Contour   is smooth, and the myometrium is homogeneous in echogenicity. Endometrial stripe measures 1.2-1.8 cm in thickness. This is   thickened. There is also mobile echogenic debris in the endometrium. Follow-up is suggested. RIGHT OVARY: The right ovary is not identified. Graylon Oswaldo LEFT OVARY: Scanning in the left adnexal region the left ovary is not   identified. .    OTHER: No abnormal free pelvic fluid is seen.        Impression   1. Thickened endometrium with multiple echogenic debris in the   endometrial cavity. Endometrial neoplasia cannot be excluded follow-up   is suggested   Signed by Dr Haider Francisco       No LMP recorded. Patient is postmenopausal.  No obstetric history on file.     Past Medical History:   Diagnosis Date    Acne     Allergic rhinitis     Asthma     due to allergies    GERD (gastroesophageal reflux disease)     Hypertension     Palpitations     Vasovagal syncope      Past Surgical History:   Procedure Laterality Date    ADENOIDECTOMY      CHOLECYSTECTOMY      COLONOSCOPY  COLONOSCOPY N/A 4/23/2018    Dr HuizarJewpfh-rlhpfblgporqno-HU x 2--5 yr recall    TONSILLECTOMY       Family History   Problem Relation Age of Onset    Heart Disease Other     Cancer Other     Colon Cancer Maternal Aunt     Colon Cancer Maternal Grandmother     Colon Polyps Neg Hx     Liver Cancer Neg Hx     Esophageal Cancer Neg Hx     Rectal Cancer Neg Hx     Stomach Cancer Neg Hx      Social History     Tobacco Use    Smoking status: Never Smoker    Smokeless tobacco: Never Used   Substance Use Topics    Alcohol use: No     Alcohol/week: 0.0 standard drinks       Current Outpatient Medications   Medication Sig Dispense Refill    famotidine (PEPCID) 20 MG tablet Take 1 tablet by mouth nightly 90 tablet 3    omeprazole (PRILOSEC) 40 MG delayed release capsule Take 40 mg by mouth daily      loratadine (CLARITIN CHILDRENS) 5 MG chewable tablet Take 1 tablet by mouth daily 30 tablet 11    atenolol (TENORMIN) 25 MG tablet Take 1 tablet by mouth daily 30 tablet 11    albuterol sulfate (PROAIR RESPICLICK) 181 (90 Base) MCG/ACT aerosol powder inhalation Inhale 2 puffs into the lungs every 6 hours as needed for Wheezing or Shortness of Breath 1 Inhaler 5     No current facility-administered medications for this visit. No Known Allergies  Vitals:    07/19/21 1415   BP: 136/78     Body mass index is 36.85 kg/m². Review of Systems   Constitutional: Negative. HENT: Negative. Eyes: Negative. Respiratory: Negative. Cardiovascular: Negative. Gastrointestinal: Negative. Negative for constipation and diarrhea. Endocrine: Negative. Genitourinary: Positive for pelvic pain and vaginal bleeding. Negative for frequency, menstrual problem and urgency. Musculoskeletal: Negative. Skin: Negative. Allergic/Immunologic: Negative. Neurological: Negative. Hematological: Negative. Psychiatric/Behavioral: Negative. All other systems reviewed and are negative.         Physical Exam  Vitals and nursing note reviewed. Constitutional:       Appearance: She is well-developed. HENT:      Head: Normocephalic. Right Ear: External ear normal.      Left Ear: External ear normal.      Nose: Nose normal.   Genitourinary:     Comments: Horacio Abel is a 54 y.o.  with history of menorrhagia who presents today for endometrial biopsy. /78   Ht 5' 3\" (1.6 m)   Wt 208 lb (94.3 kg)   BMI 36.85 kg/m²     Endometrial Biopsy Procedure Note    Pre-operative Diagnosis: PMB    Post-operative Diagnosis: same    Indications: postmenopausal bleeding    Procedure Details    Urine pregnancy test was not done. The risks (including infection, bleeding, pain, and uterine perforation) and benefits of the procedure were explained to the patient and Written informed consent was obtained. The patient was placed in the dorsal lithotomy position. Speculum inserted in the vagina, and the cervix prepped with povidone iodine. Single tooth tenaculum applied to anterior cervical lip. Uterus was sounded to 8cm. Pipelle endometrial aspirator was inserted and gentle pressure applied. Adequate tissue sample obtained. Sent for pathology. Pt tolerated well. Condition:  Stable    Complications:  None    Plan:    The patient was advised to call for any fever or for prolonged or severe pain or bleeding. She was advised to use OTC ibuprofen as needed for mild to moderate pain. She was advised to avoid vaginal intercourse for 48 hours or until the bleeding has completely stopped. Musculoskeletal:         General: Normal range of motion. Cervical back: Normal range of motion. Skin:     General: Skin is warm and dry. Neurological:      Mental Status: She is alert and oriented to person, place, and time.    Psychiatric:         Attention and Perception: Attention normal.         Mood and Affect: Mood normal.         Speech: Speech normal.         Behavior: Behavior normal.         Thought Content: Thought content normal.         Cognition and Memory: Cognition normal.         Judgment: Judgment normal.          Diagnosis Orders   1. Encounter to establish care     2. Postmenopausal bleeding  59758 - ID BIOPSY OF UTERUS LINING    Surgical Pathology   3. Abnormal vaginal bleeding with endometrial thickness greater than 5 mm present on transvaginal ultrasound in postmenopausal patient         MEDICATIONS:  No orders of the defined types were placed in this encounter. ORDERS:  Orders Placed This Encounter   Procedures    Surgical Pathology    10698 - ID BIOPSY OF UTERUS LINING       PLAN:  Discussed u/s results with pt understanding  Endo bx pending  Will form plan based on path report    Over 50% of the total visit time of 45 minutes was spent on counseling and/or coordination. All questions were answered and the patient voiced understanding. Patient Instructions     Patient Education        Vaginal Bleeding After Menopause: Care Instructions  Your Care Instructions     Vaginal bleeding after menopause can have many causes. Causes may include infection, inflammation, prescription hormones, abnormal growths, and injury. Your doctor may want you to have more tests to find the cause of your vaginal bleeding. Follow-up care is a key part of your treatment and safety. Be sure to make and go to all appointments, and call your doctor if you are having problems. It's also a good idea to know your test results and keep a list of the medicines you take. How can you care for yourself at home? · If your doctor gave you medicine, take it exactly as prescribed. Call your doctor if you think you are having a problem with your medicine. · Do not have sex or put anything inside your vagina until you talk with your doctor. · Do not douche. When should you call for help? Call 911 anytime you think you may need emergency care. For example, call if:    · You passed out (lost consciousness).    Call your doctor now or seek immediate medical care if:    · You have severe vaginal bleeding.     · You are dizzy or lightheaded, or you feel like you may faint.     · You have new or worse belly or pelvic pain. Watch closely for changes in your health, and be sure to contact your doctor if:    · Your bleeding gets worse.     · You think you might be pregnant.     · You do not get better as expected. Where can you learn more? Go to https://chpepiceweb.healthOnly [employer] Pet [employer]. org and sign in to your [employer] account. Enter N304 in the [employer] box to learn more about \"Vaginal Bleeding After Menopause: Care Instructions. \"     If you do not have an account, please click on the \"Sign Up Now\" link. Current as of: February 11, 2021               Content Version: 12.9  © 4299-8950 Healthwise, Incorporated. Care instructions adapted under license by [facility]). If you have questions about a medical condition or this instruction, always ask your healthcare professional. [place] [address] any warranty or liability for your use of this information.

## 2021-07-19 NOTE — PATIENT INSTRUCTIONS
Patient Education        Vaginal Bleeding After Menopause: Care Instructions  Your Care Instructions     Vaginal bleeding after menopause can have many causes. Causes may include infection, inflammation, prescription hormones, abnormal growths, and injury. Your doctor may want you to have more tests to find the cause of your vaginal bleeding. Follow-up care is a key part of your treatment and safety. Be sure to make and go to all appointments, and call your doctor if you are having problems. It's also a good idea to know your test results and keep a list of the medicines you take. How can you care for yourself at home? · If your doctor gave you medicine, take it exactly as prescribed. Call your doctor if you think you are having a problem with your medicine. · Do not have sex or put anything inside your vagina until you talk with your doctor. · Do not douche. When should you call for help? Call 911 anytime you think you may need emergency care. For example, call if:    · You passed out (lost consciousness). Call your doctor now or seek immediate medical care if:    · You have severe vaginal bleeding.     · You are dizzy or lightheaded, or you feel like you may faint.     · You have new or worse belly or pelvic pain. Watch closely for changes in your health, and be sure to contact your doctor if:    · Your bleeding gets worse.     · You think you might be pregnant.     · You do not get better as expected. Where can you learn more? Go to https://Fetchmobpeedis.healthPortAuthority Technologies. org and sign in to your [employer] account. Enter N304 in the [facility] box to learn more about \"Vaginal Bleeding After Menopause: Care Instructions. \"     If you do not have an account, please click on the \"Sign Up Now\" link. Current as of: February 11, 2021               Content Version: 12.9  © 4469-3147 Healthwise, Incorporated. Care instructions adapted under license by [facility] ([facility]).  If you have questions about a medical condition or this instruction, always ask your healthcare professional. [place] [address] any warranty or liability for your use of this information.

## 2021-07-19 NOTE — PROGRESS NOTES
Pt states she started bleeding last Tuesday. She is postmenopausal, she says the bleeding has slowed down. She is still having some bleeding today.

## 2021-07-20 ENCOUNTER — TELEPHONE (OUTPATIENT)
Dept: GASTROENTEROLOGY | Age: 56
End: 2021-07-20

## 2021-07-21 NOTE — TELEPHONE ENCOUNTER
Hannah from [facility] called regarding Campos Flores, no  was left on this patient. [facility] said they did fax some labs here a CBC,CMP and a TSH, said her liver enzymes are elevated and want to make sure she has appointment. [facility] is asking [facility] to call her back and discuss. [address] [place]

## 2021-07-21 NOTE — TELEPHONE ENCOUNTER
7/21/21    LVM ON NURSE LINE FOR A CALL BACK TO SEE IF WE NEED TO SEE THIS PT. LABS HAVE NOT BEEN RECEIVED.

## 2021-07-23 ENCOUNTER — TELEPHONE (OUTPATIENT)
Dept: OBGYN CLINIC | Age: 56
End: 2021-07-23

## 2021-07-23 NOTE — TELEPHONE ENCOUNTER
Pt returned call. I told her that her emb was negative but because of the breakdown of the lining of her uterus, she will need a Hysteroscopy, Myosure, D&C, I scheduled it for 8-20-21, pre op appt made. Procedure explained. Pt v/u.

## 2021-07-23 NOTE — TELEPHONE ENCOUNTER
----- Message from JESSICA Bowie CNP sent at 7/22/2021  5:19 PM CDT -----  Please let her know her biopsy was negative but lining showing extensive breakdown in the tissue, likely causing the bleeding. Can check with DR Jennie Siddiqui, but I feel like she will recommend hysteroscopy D&C based on u/s findings. Can set her up for scheduling.  Thanks ML

## 2021-07-23 NOTE — TELEPHONE ENCOUNTER
Lm that her biopsy was negative, but that we needed to discuss further recommendations regarding treatment.

## 2021-07-28 ENCOUNTER — OFFICE VISIT (OUTPATIENT)
Dept: GASTROENTEROLOGY | Age: 56
End: 2021-07-28
Payer: COMMERCIAL

## 2021-07-28 VITALS
HEIGHT: 63 IN | HEART RATE: 82 BPM | DIASTOLIC BLOOD PRESSURE: 76 MMHG | WEIGHT: 208.4 LBS | BODY MASS INDEX: 36.93 KG/M2 | SYSTOLIC BLOOD PRESSURE: 142 MMHG | OXYGEN SATURATION: 98 %

## 2021-07-28 DIAGNOSIS — R74.01 TRANSAMINITIS: Primary | ICD-10-CM

## 2021-07-28 PROCEDURE — 99213 OFFICE O/P EST LOW 20 MIN: CPT | Performed by: NURSE PRACTITIONER

## 2021-07-28 ASSESSMENT — ENCOUNTER SYMPTOMS
RECTAL PAIN: 0
ABDOMINAL DISTENTION: 0
SHORTNESS OF BREATH: 0
NAUSEA: 0
COUGH: 0
DIARRHEA: 0
TROUBLE SWALLOWING: 0
BACK PAIN: 0
ANAL BLEEDING: 0
VOMITING: 0
VOICE CHANGE: 0
ABDOMINAL PAIN: 0
CONSTIPATION: 0
BLOOD IN STOOL: 0

## 2021-07-28 NOTE — PROGRESS NOTES
Subjective:      Sheela Adames is a52 y.o. female  Chief Complaint   Patient presents with    Other     elevated liver enzymes        HPI  PCP: Mary Jane Hall DO  Referring Provider: Mary Jane Hall DO  Pt is referred back here by her PCP for transaminitis. Had full evaluation of transaminitis here a few months ago. It was largely unrevealing and enzymes trended down. She has a known fatty liver. Recent labs 7/2021 note her liver enzymes have increased again.  (<32),  (<33), total bili normal, alk phos normal.  She has been seeing a dietician and has working on her diet and has lost 3 pounds. We discussed liver biopsy and referral to hepatology for evaluation. She defers at this time. She is scheduled for a D&C soon and wants to get this done. And also wants to continue with low fat diet to see if her liver enzymes improve. I offered to trend her liver enzymes, she defers this she reports she sees her PCP routinely, next appt is in a few weeks,  and she checks her liver enzymes so she wants her PCP to monitor this. Family HX:    Pt denies family hx of colon polyps, colon CA, inflammatory bowel dx, gastric CA and esophageal CA.     Past Medical History:   Diagnosis Date    Acne     Allergic rhinitis     Asthma     due to allergies    GERD (gastroesophageal reflux disease)     Hypertension     Palpitations     Vasovagal syncope           Past Surgical History:   Procedure Laterality Date    ADENOIDECTOMY      CHOLECYSTECTOMY      COLONOSCOPY      COLONOSCOPY N/A 4/23/2018    Dr HuizarVhynum-isnyjtkpzyqpyx-HK x 2--5 yr recall    TONSILLECTOMY         Social History     Socioeconomic History    Marital status:      Spouse name: None    Number of children: None    Years of education: None    Highest education level: None   Occupational History    None   Tobacco Use    Smoking status: Never Smoker    Smokeless tobacco: Never Used   Vaping Use    Vaping Use: Never used   Substance and Sexual Activity    Alcohol use: No     Alcohol/week: 0.0 standard drinks    Drug use: No    Sexual activity: None   Other Topics Concern    None   Social History Narrative    None     Social Determinants of Health     Financial Resource Strain:     Difficulty of Paying Living Expenses:    Food Insecurity:     Worried About Running Out of Food in the Last Year:     Ran Out of Food in the Last Year:    Transportation Needs:     Lack of Transportation (Medical):  Lack of Transportation (Non-Medical):    Physical Activity:     Days of Exercise per Week:     Minutes of Exercise per Session:    Stress:     Feeling of Stress :    Social Connections:     Frequency of Communication with Friends and Family:     Frequency of Social Gatherings with Friends and Family:     Attends [employer] Services:     Active Member of Clubs or Organizations:     Attends Club or Organization Meetings:     Marital Status:    Intimate Partner Violence:     Fear of Current or Ex-Partner:     Emotionally Abused:     Physically Abused:     Sexually Abused:        No Known Allergies    Current Outpatient Medications   Medication Sig Dispense Refill    Fluticasone Propionate (FLONASE NA) by Nasal route as needed      famotidine (PEPCID) 20 MG tablet Take 1 tablet by mouth nightly (Patient taking differently: Take 20 mg by mouth nightly as needed ) 90 tablet 3    omeprazole (PRILOSEC) 40 MG delayed release capsule Take 40 mg by mouth daily      loratadine (CLARITIN CHILDRENS) 5 MG chewable tablet Take 1 tablet by mouth daily 30 tablet 11    atenolol (TENORMIN) 25 MG tablet Take 1 tablet by mouth daily 30 tablet 11    albuterol sulfate (PROAIR RESPICLICK) 330 (90 Base) MCG/ACT aerosol powder inhalation Inhale 2 puffs into the lungs every 6 hours as needed for Wheezing or Shortness of Breath 1 Inhaler 5     No current facility-administered medications for this visit.        Review of Systems Constitutional: Negative for fatigue and unexpected weight change. HENT: Negative for trouble swallowing and voice change. Respiratory: Negative for cough and shortness of breath. Cardiovascular: Negative for chest pain and palpitations. Gastrointestinal: Negative for abdominal distention, abdominal pain, anal bleeding, blood in stool, constipation, diarrhea, nausea, rectal pain and vomiting. Genitourinary: Negative for hematuria. Musculoskeletal: Negative for arthralgias, back pain and neck pain. Neurological: Negative for weakness and headaches. Psychiatric/Behavioral: Negative for dysphoric mood. The patient is not nervous/anxious. Objective:     Physical Exam  Vitals and nursing note reviewed. Constitutional:       Appearance: She is well-developed. Comments: BP (!) 142/76   Pulse 82   Ht 5' 3\" (1.6 m)   Wt 208 lb 6.4 oz (94.5 kg)   SpO2 98%   BMI 36.92 kg/m²    Eyes:      General: No scleral icterus. Conjunctiva/sclera: Conjunctivae normal.      Pupils: Pupils are equal, round, and reactive to light. Cardiovascular:      Rate and Rhythm: Normal rate and regular rhythm. Heart sounds: Normal heart sounds. No murmur heard. No friction rub. No gallop. Pulmonary:      Effort: Pulmonary effort is normal. No respiratory distress. Breath sounds: Normal breath sounds. Abdominal:      General: Bowel sounds are normal. There is no distension. Palpations: Abdomen is soft. Tenderness: There is no abdominal tenderness. There is no rebound. Neurological:      Mental Status: She is alert and oriented to person, place, and time. Cranial Nerves: No cranial nerve deficit. Psychiatric:         Judgment: Judgment normal.           Assessment:       Diagnosis Orders   1. Transaminitis           Plan:      1. F/u prn. She knows to make another appt here if she changes her mind and wishes to do a liver biopsy/referral to hepatology.

## 2021-08-09 ENCOUNTER — OFFICE VISIT (OUTPATIENT)
Dept: OBGYN CLINIC | Age: 56
End: 2021-08-09
Payer: COMMERCIAL

## 2021-08-09 VITALS
WEIGHT: 206 LBS | DIASTOLIC BLOOD PRESSURE: 80 MMHG | SYSTOLIC BLOOD PRESSURE: 140 MMHG | HEIGHT: 63 IN | BODY MASS INDEX: 36.5 KG/M2

## 2021-08-09 DIAGNOSIS — N95.0 POSTMENOPAUSAL BLEEDING: Primary | ICD-10-CM

## 2021-08-09 DIAGNOSIS — R93.89 ABNORMAL VAGINAL BLEEDING WITH ENDOMETRIAL THICKNESS GREATER THAN 5 MM PRESENT ON TRANSVAGINAL ULTRASOUND IN POSTMENOPAUSAL PATIENT: ICD-10-CM

## 2021-08-09 DIAGNOSIS — N95.0 ABNORMAL VAGINAL BLEEDING WITH ENDOMETRIAL THICKNESS GREATER THAN 5 MM PRESENT ON TRANSVAGINAL ULTRASOUND IN POSTMENOPAUSAL PATIENT: ICD-10-CM

## 2021-08-09 PROCEDURE — 99214 OFFICE O/P EST MOD 30 MIN: CPT | Performed by: OBSTETRICS & GYNECOLOGY

## 2021-08-09 RX ORDER — MISOPROSTOL 200 UG/1
200 TABLET ORAL ONCE
Qty: 1 TABLET | Refills: 0 | Status: ON HOLD
Start: 2021-08-09 | End: 2021-08-20 | Stop reason: HOSPADM

## 2021-08-09 ASSESSMENT — ENCOUNTER SYMPTOMS
GASTROINTESTINAL NEGATIVE: 1
RESPIRATORY NEGATIVE: 1
EYES NEGATIVE: 1

## 2021-08-09 NOTE — PATIENT INSTRUCTIONS
Patient Education        Hysteroscopy With Dilation and Curettage: What to Expect at [address] [facility]     For a hysteroscopy, your doctor guides a lighted tube through your cervix and into your uterus. This helps the doctor see inside your uterus. For a dilation and curettage (D&C), your doctor uses a curved tool, called a curette, to gently scrape tissue from your uterus. After these procedures, you are likely to have a backache or cramps similar to menstrual cramps. Expect to pass small clots of blood from your vagina for the first few days. You may have light vaginal bleeding for several weeks after the D&C. If the doctor filled your uterus with air, you may have gas pains or your belly may feel full. You may also have shoulder pain. These symptoms should go away in 1 to 2 days. You will probably be able to go back to most of your normal activities in 1 or 2 days. This care sheet gives you a general idea about how long it will take for you to recover. But each person recovers at a different pace. Follow the steps below to get better as quickly as possible. How can you care for yourself at home? Activity    · Rest when you feel tired.     · Most women are able to return to work the day after the procedure.     · You may have some light vaginal bleeding. Use sanitary pads until you stop bleeding. Using pads makes it easier to monitor your bleeding. Do not rinse your vagina with fluid (douche).   · Ask your doctor when it is okay for you to have sex. Diet    · You can eat your normal diet. If your stomach is upset, try bland, low-fat foods like plain rice, broiled chicken, toast, and yogurt.     · Drink plenty of fluids (unless your doctor tells you not to). Medicines    · Your doctor will tell you if and when you can restart your medicines.  You will also get instructions about taking any new medicines.     · If you take aspirin or some other blood thinner, ask your doctor if and when to start taking it again. Make sure that you understand exactly what your doctor wants you to do.     · Be safe with medicines. Read and follow all instructions on the label. ? If the doctor gave you a prescription medicine for pain, take it as prescribed. ? If you are not taking a prescription pain medicine, ask your doctor if you can take an over-the-counter medicine.     · If your doctor prescribed antibiotics, take them as directed. Do not stop taking them just because you feel better. You need to take the full course of antibiotics. Follow-up care is a key part of your treatment and safety. Be sure to make and go to all appointments, and call your doctor if you are having problems. It's also a good idea to know your test results and keep a list of the medicines you take. When should you call for help? Call 911 anytime you think you may need emergency care. For example, call if:    · You passed out (lost consciousness).     · You have chest pain, are short of breath, or cough up blood. Call your doctor now or seek immediate medical care if:    · You have pain that does not get better after you take pain medicine.     · You cannot pass stools or gas.     · You have bright red vaginal bleeding that soaks one or more pads in an hour, or you have large clots.     · You have a vaginal discharge that has increased or that smells bad.     · You are sick to your stomach or cannot drink fluids.     · You have symptoms of a blood clot in your leg (called a deep vein thrombosis), such as:  ? Pain in your calf, back of the knee, thigh, or groin. ? Redness and swelling in your leg.     · You have signs of infection, such as:  ? Increased pain, swelling, warmth, or redness. ? Red streaks leading from the incision. ? Pus draining from the incision. ? A fever. Watch closely for changes in your health, and be sure to contact your doctor if you have any problems. Where can you learn more?   Go to [email or web address] and sign in to your [employer] account. Enter T272 in the [place] box to learn more about \"Hysteroscopy With Dilation and Curettage: What to Expect at Home. \"     If you do not have an account, please click on the \"Sign Up Now\" link. Current as of: October 8, 2020               Content Version: 12.9  © 2006-2021 Healthwise, Incorporated. Care instructions adapted under license by [facility] ([facility]). If you have questions about a medical condition or this instruction, always ask your healthcare professional. [address] any warranty or liability for your use of this information.

## 2021-08-09 NOTE — PROGRESS NOTES
Sheela Adames is a 54 y.o.  who presents today for her pre op visit for a Hysteroscopy, myosure, D&C on 8-20-21 for PMB, endometrial thickening. Review of Systems   Constitutional: Negative. HENT: Negative. Eyes: Negative. Respiratory: Negative. Cardiovascular: Negative. Gastrointestinal: Negative. Genitourinary: Positive for vaginal bleeding. Negative for dysuria, frequency, menstrual problem, pelvic pain, urgency and vaginal discharge. Skin: Negative. Neurological: Negative. Psychiatric/Behavioral: Negative.         Past Medical History:   Diagnosis Date    Acne     Allergic rhinitis     Asthma     due to allergies    GERD (gastroesophageal reflux disease)     Hypertension     Palpitations     Vasovagal syncope        Past Surgical History:   Procedure Laterality Date    ADENOIDECTOMY      CHOLECYSTECTOMY      COLONOSCOPY      COLONOSCOPY N/A 4/23/2018    Dr HuizarJhdgkz-wdljyldqijyynj-RR x 2--5 yr recall    DILATION AND CURETTAGE OF UTERUS N/A 8/20/2021    EXAM UNDER ANESTHESIA, DILATATION AND CURETTAGE HYSTEROSCOPY, Robert Soler performed by David Roman MD at [facility] OR    TONSILLECTOMY         Family History   Problem Relation Age of Onset    Heart Disease Other     Cancer Other     Colon Cancer Maternal Aunt     Colon Cancer Maternal Grandmother     Colon Polyps Neg Hx     Liver Cancer Neg Hx     Esophageal Cancer Neg Hx     Rectal Cancer Neg Hx     Stomach Cancer Neg Hx        Social History     Socioeconomic History    Marital status:      Spouse name: Not on file    Number of children: Not on file    Years of education: Not on file    Highest education level: Not on file   Occupational History    Not on file   Tobacco Use    Smoking status: Never Smoker    Smokeless tobacco: Never Used   Vaping Use    Vaping Use: Never used   Substance and Sexual Activity    Alcohol use: No     Alcohol/week: 0.0 standard drinks    Drug use: No    Sexual activity: Not on file   Other Topics Concern    Not on file   Social History Narrative    Not on file     Social Determinants of Health     Financial Resource Strain:     Difficulty of Paying Living Expenses:    Food Insecurity:     Worried About Running Out of Food in the Last Year:     [address] in the Last Year:    Transportation Needs:     Lack of Transportation (Medical):      Lack of Transportation (Non-Medical):    Physical Activity:     Days of Exercise per Week:     Minutes of Exercise per Session:    Stress:     Feeling of Stress :    Social Connections:     Frequency of Communication with Friends and Family:     Frequency of Social Gatherings with Friends and Family:     Attends Judaism Services:     Active Member of Clubs or Organizations:     Attends Club or Organization Meetings:     Marital Status:    Intimate Partner Violence:     Fear of Current or Ex-Partner:     Emotionally Abused:     Physically Abused:     Sexually Abused:          Current Outpatient Medications:     ibuprofen (ADVIL;MOTRIN) 800 MG tablet, Take 1 tablet by mouth every 8 hours as needed for Pain, Disp: 60 tablet, Rfl: 0    famotidine (PEPCID) 20 MG tablet, Take 1 tablet by mouth nightly (Patient taking differently: Take 20 mg by mouth nightly as needed Not taking), Disp: 90 tablet, Rfl: 3    omeprazole (PRILOSEC) 40 MG delayed release capsule, Take 40 mg by mouth daily, Disp: , Rfl:     loratadine (CLARITIN CHILDRENS) 5 MG chewable tablet, Take 1 tablet by mouth daily, Disp: 30 tablet, Rfl: 11    atenolol (TENORMIN) 25 MG tablet, Take 1 tablet by mouth daily, Disp: 30 tablet, Rfl: 11    albuterol sulfate (PROAIR RESPICLICK) 917 (90 Base) MCG/ACT aerosol powder inhalation, Inhale 2 puffs into the lungs every 6 hours as needed for Wheezing or Shortness of Breath, Disp: 1 Inhaler, Rfl: 5    No Known Allergies    BP (!) 140/80   Ht 5' 3\" (1.6 m)   Wt 206 lb (93.4 kg)   LMP 07/28/2021 (Approximate) BMI 36.49 kg/m²   Physical Exam  Constitutional:       General: She is not in acute distress. Appearance: She is well-developed. She is not diaphoretic. HENT:      Head: Normocephalic and atraumatic. Hair is normal.      Right Ear: Hearing and external ear normal. No decreased hearing noted. Left Ear: Hearing and external ear normal. No decreased hearing noted. Nose: Nose normal. No rhinorrhea. Mouth/Throat:      Dentition: Normal dentition. Eyes:      General:         Right eye: No discharge. Left eye: No discharge. Conjunctiva/sclera: Conjunctivae normal.      Pupils: Pupils are equal, round, and reactive to light. Pulmonary:      Effort: Pulmonary effort is normal. No respiratory distress. Musculoskeletal:         General: No deformity. Normal range of motion. Cervical back: Normal range of motion. Skin:     General: Skin is warm and dry. Coloration: Skin is not pale. Findings: No rash. Neurological:      Mental Status: She is alert and oriented to person, place, and time. Cranial Nerves: No cranial nerve deficit. Psychiatric:         Speech: Speech normal.         Behavior: Behavior normal.         Thought Content: Thought content normal.         Judgment: Judgment normal.       FINAL DIAGNOSIS:     Uterus, endometrial biopsy: Weakly proliferative endometrium with   extensive breakdown and hemorrhage, negative for evidence of hyperplasia   or carcinoma. Narrative   EXAMINATION: US NON OB TRANSVAGINAL 7/14/2021 4:20 PM   HISTORY: Ultrasound pelvis, transvaginal 7/14/2021 2:43 PM   REASON FOR EXAM: N95.0     COMPARISON: None    TECHNIQUE: Multiple longitudinal and transverse real-time sonographic   images of the pelvis are obtained using an intravaginal transducer. FINDINGS:     UTERUS: Anteflexed in position and measures 5 x 5.8 x 7.7 cm. Contour   is smooth, and the myometrium is homogeneous in echogenicity.    Endometrial stripe measures 1.2-1.8 cm in thickness. This is   thickened. There is also mobile echogenic debris in the endometrium. Follow-up is suggested. RIGHT OVARY: The right ovary is not identified. Jason Boyd LEFT OVARY: Scanning in the left adnexal region the left ovary is not   identified. .    OTHER: No abnormal free pelvic fluid is seen.        Impression   1. Thickened endometrium with multiple echogenic debris in the   endometrial cavity. Endometrial neoplasia cannot be excluded follow-up   is suggested   Signed by Dr Mahogany Davis           Assessment   Diagnosis Orders   1. Postmenopausal bleeding     2. Abnormal vaginal bleeding with endometrial thickness greater than 5 mm present on transvaginal ultrasound in postmenopausal patient         Plan     1. Consent signed for Hysteroscopy, Myosure, dilation and curettage. Procedure and risks explained. Risks include bleeding, infection, perforation of the uterus which would result in the inability to complete the procedure. All questions answered. 2. Cytotec 200 mg sent  3. Will notify of surgical pathology results. Kacy Kramer, am scribing for and in the presence of Dr. Anne Bernabe. I, Dr. Anne Bernabe, personally performed the services described in this documentation as scribed by Cherri Butcher in my presence, and it is both accurate and complete.

## 2021-08-18 ENCOUNTER — HOSPITAL ENCOUNTER (OUTPATIENT)
Dept: PREADMISSION TESTING | Age: 56
Discharge: HOME | End: 2021-08-22
Payer: COMMERCIAL

## 2021-08-18 VITALS — WEIGHT: 208 LBS | BODY MASS INDEX: 36.85 KG/M2

## 2021-08-18 LAB
ALBUMIN SERPL-MCNC: 4.3 G/DL (ref 3.5–5.2)
ALP BLD-CCNC: 67 U/L (ref 35–104)
ALT SERPL-CCNC: 129 U/L (ref 5–33)
ANION GAP SERPL CALCULATED.3IONS-SCNC: 13 MMOL/L (ref 7–19)
AST SERPL-CCNC: 124 U/L (ref 5–32)
BASOPHILS ABSOLUTE: 0.1 K/UL (ref 0–0.2)
BASOPHILS RELATIVE PERCENT: 0.7 % (ref 0–1)
BILIRUB SERPL-MCNC: 0.5 MG/DL (ref 0.2–1.2)
BUN BLDV-MCNC: 10 MG/DL (ref 6–20)
CALCIUM SERPL-MCNC: 9.5 MG/DL (ref 8.6–10)
CHLORIDE BLD-SCNC: 99 MMOL/L (ref 98–111)
CO2: 26 MMOL/L (ref 22–29)
CREAT SERPL-MCNC: 0.8 MG/DL (ref 0.5–0.9)
EKG P AXIS: 55 DEGREES
EKG P-R INTERVAL: 196 MS
EKG Q-T INTERVAL: 412 MS
EKG QRS DURATION: 76 MS
EKG QTC CALCULATION (BAZETT): 433 MS
EKG T AXIS: 31 DEGREES
EOSINOPHILS ABSOLUTE: 0.2 K/UL (ref 0–0.6)
EOSINOPHILS RELATIVE PERCENT: 2.3 % (ref 0–5)
GFR AFRICAN AMERICAN: >59
GFR NON-AFRICAN AMERICAN: >60
GLUCOSE BLD-MCNC: 158 MG/DL (ref 74–109)
HCT VFR BLD CALC: 41.4 % (ref 37–47)
HEMOGLOBIN: 12.7 G/DL (ref 12–16)
IMMATURE GRANULOCYTES #: 0 K/UL
LYMPHOCYTES ABSOLUTE: 2.3 K/UL (ref 1.1–4.5)
LYMPHOCYTES RELATIVE PERCENT: 32 % (ref 20–40)
MCH RBC QN AUTO: 26.7 PG (ref 27–31)
MCHC RBC AUTO-ENTMCNC: 30.7 G/DL (ref 33–37)
MCV RBC AUTO: 87.2 FL (ref 81–99)
MONOCYTES ABSOLUTE: 0.7 K/UL (ref 0–0.9)
MONOCYTES RELATIVE PERCENT: 9.8 % (ref 0–10)
NEUTROPHILS ABSOLUTE: 4 K/UL (ref 1.5–7.5)
NEUTROPHILS RELATIVE PERCENT: 54.6 % (ref 50–65)
PDW BLD-RTO: 13.4 % (ref 11.5–14.5)
PLATELET # BLD: 281 K/UL (ref 130–400)
PMV BLD AUTO: 9.7 FL (ref 9.4–12.3)
POTASSIUM SERPL-SCNC: 4.8 MMOL/L (ref 3.5–5)
RBC # BLD: 4.75 M/UL (ref 4.2–5.4)
SODIUM BLD-SCNC: 138 MMOL/L (ref 136–145)
TOTAL PROTEIN: 7.6 G/DL (ref 6.6–8.7)
WBC # BLD: 7.3 K/UL (ref 4.8–10.8)

## 2021-08-18 PROCEDURE — 85025 COMPLETE CBC W/AUTO DIFF WBC: CPT

## 2021-08-18 PROCEDURE — 80053 COMPREHEN METABOLIC PANEL: CPT

## 2021-08-18 PROCEDURE — 93005 ELECTROCARDIOGRAM TRACING: CPT | Performed by: OBSTETRICS & GYNECOLOGY

## 2021-08-19 ENCOUNTER — TELEPHONE (OUTPATIENT)
Dept: OBGYN CLINIC | Age: 56
End: 2021-08-19

## 2021-08-19 NOTE — TELEPHONE ENCOUNTER
Called and spoke with pt. She will arrive tomorrow @ 830 AM for surgery at 1030. NPO after midnight and she will insert her cytotec before bedtime.  Pt v/u

## 2021-08-20 ENCOUNTER — ANESTHESIA EVENT (OUTPATIENT)
Dept: OPERATING ROOM | Age: 56
End: 2021-08-20
Payer: COMMERCIAL

## 2021-08-20 ENCOUNTER — HOSPITAL ENCOUNTER (OUTPATIENT)
Age: 56
Setting detail: OUTPATIENT SURGERY
Discharge: HOME | End: 2021-08-20
Attending: OBSTETRICS & GYNECOLOGY | Admitting: OBSTETRICS & GYNECOLOGY
Payer: COMMERCIAL

## 2021-08-20 ENCOUNTER — ANESTHESIA (OUTPATIENT)
Dept: OPERATING ROOM | Age: 56
End: 2021-08-20
Payer: COMMERCIAL

## 2021-08-20 VITALS
DIASTOLIC BLOOD PRESSURE: 85 MMHG | BODY MASS INDEX: 36.86 KG/M2 | HEIGHT: 63 IN | HEART RATE: 61 BPM | TEMPERATURE: 98.3 F | SYSTOLIC BLOOD PRESSURE: 148 MMHG | OXYGEN SATURATION: 96 % | RESPIRATION RATE: 18 BRPM | WEIGHT: 208 LBS

## 2021-08-20 VITALS — OXYGEN SATURATION: 98 % | DIASTOLIC BLOOD PRESSURE: 58 MMHG | SYSTOLIC BLOOD PRESSURE: 103 MMHG | TEMPERATURE: 97 F

## 2021-08-20 DIAGNOSIS — Z98.890 S/P D&C (STATUS POST DILATION AND CURETTAGE): Primary | ICD-10-CM

## 2021-08-20 LAB — HCG(URINE) PREGNANCY TEST: NEGATIVE

## 2021-08-20 PROCEDURE — 6360000002 HC RX W HCPCS: Performed by: NURSE ANESTHETIST, CERTIFIED REGISTERED

## 2021-08-20 PROCEDURE — 2709999900 HC NON-CHARGEABLE SUPPLY: Performed by: OBSTETRICS & GYNECOLOGY

## 2021-08-20 PROCEDURE — 3700000000 HC ANESTHESIA ATTENDED CARE: Performed by: OBSTETRICS & GYNECOLOGY

## 2021-08-20 PROCEDURE — 2500000003 HC RX 250 WO HCPCS: Performed by: NURSE ANESTHETIST, CERTIFIED REGISTERED

## 2021-08-20 PROCEDURE — 84703 CHORIONIC GONADOTROPIN ASSAY: CPT

## 2021-08-20 PROCEDURE — 3600000004 HC SURGERY LEVEL 4 BASE: Performed by: OBSTETRICS & GYNECOLOGY

## 2021-08-20 PROCEDURE — 7100000010 HC PHASE II RECOVERY - FIRST 15 MIN: Performed by: OBSTETRICS & GYNECOLOGY

## 2021-08-20 PROCEDURE — 7100000000 HC PACU RECOVERY - FIRST 15 MIN: Performed by: OBSTETRICS & GYNECOLOGY

## 2021-08-20 PROCEDURE — 88305 TISSUE EXAM BY PATHOLOGIST: CPT

## 2021-08-20 PROCEDURE — 2580000003 HC RX 258: Performed by: NURSE ANESTHETIST, CERTIFIED REGISTERED

## 2021-08-20 PROCEDURE — 2720000010 HC SURG SUPPLY STERILE: Performed by: OBSTETRICS & GYNECOLOGY

## 2021-08-20 PROCEDURE — 7100000001 HC PACU RECOVERY - ADDTL 15 MIN: Performed by: OBSTETRICS & GYNECOLOGY

## 2021-08-20 PROCEDURE — 58558 HYSTEROSCOPY BIOPSY: CPT | Performed by: OBSTETRICS & GYNECOLOGY

## 2021-08-20 PROCEDURE — 3700000001 HC ADD 15 MINUTES (ANESTHESIA): Performed by: OBSTETRICS & GYNECOLOGY

## 2021-08-20 PROCEDURE — 7100000011 HC PHASE II RECOVERY - ADDTL 15 MIN: Performed by: OBSTETRICS & GYNECOLOGY

## 2021-08-20 PROCEDURE — 3600000014 HC SURGERY LEVEL 4 ADDTL 15MIN: Performed by: OBSTETRICS & GYNECOLOGY

## 2021-08-20 RX ORDER — SODIUM CHLORIDE 0.9 % (FLUSH) 0.9 %
5-40 SYRINGE (ML) INJECTION EVERY 12 HOURS SCHEDULED
Status: DISCONTINUED | OUTPATIENT
Start: 2021-08-20 | End: 2021-08-20 | Stop reason: HOSPADM

## 2021-08-20 RX ORDER — MORPHINE SULFATE 4 MG/ML
4 INJECTION, SOLUTION INTRAMUSCULAR; INTRAVENOUS
Status: DISCONTINUED | OUTPATIENT
Start: 2021-08-20 | End: 2021-08-20 | Stop reason: HOSPADM

## 2021-08-20 RX ORDER — MORPHINE SULFATE 4 MG/ML
2 INJECTION, SOLUTION INTRAMUSCULAR; INTRAVENOUS EVERY 5 MIN PRN
Status: DISCONTINUED | OUTPATIENT
Start: 2021-08-20 | End: 2021-08-20 | Stop reason: HOSPADM

## 2021-08-20 RX ORDER — KETOROLAC TROMETHAMINE 30 MG/ML
INJECTION, SOLUTION INTRAMUSCULAR; INTRAVENOUS PRN
Status: DISCONTINUED | OUTPATIENT
Start: 2021-08-20 | End: 2021-08-20 | Stop reason: SDUPTHER

## 2021-08-20 RX ORDER — PROPOFOL 10 MG/ML
INJECTION, EMULSION INTRAVENOUS PRN
Status: DISCONTINUED | OUTPATIENT
Start: 2021-08-20 | End: 2021-08-20 | Stop reason: SDUPTHER

## 2021-08-20 RX ORDER — PROMETHAZINE HYDROCHLORIDE 25 MG/ML
6.25 INJECTION, SOLUTION INTRAMUSCULAR; INTRAVENOUS
Status: DISCONTINUED | OUTPATIENT
Start: 2021-08-20 | End: 2021-08-20 | Stop reason: HOSPADM

## 2021-08-20 RX ORDER — SODIUM CHLORIDE, SODIUM LACTATE, POTASSIUM CHLORIDE, CALCIUM CHLORIDE 600; 310; 30; 20 MG/100ML; MG/100ML; MG/100ML; MG/100ML
INJECTION, SOLUTION INTRAVENOUS CONTINUOUS
Status: DISCONTINUED | OUTPATIENT
Start: 2021-08-20 | End: 2021-08-20 | Stop reason: HOSPADM

## 2021-08-20 RX ORDER — SODIUM CHLORIDE 0.9 % (FLUSH) 0.9 %
5-40 SYRINGE (ML) INJECTION PRN
Status: DISCONTINUED | OUTPATIENT
Start: 2021-08-20 | End: 2021-08-20 | Stop reason: HOSPADM

## 2021-08-20 RX ORDER — ENALAPRILAT 2.5 MG/2ML
1.25 INJECTION INTRAVENOUS
Status: DISCONTINUED | OUTPATIENT
Start: 2021-08-20 | End: 2021-08-20 | Stop reason: HOSPADM

## 2021-08-20 RX ORDER — SODIUM CHLORIDE, SODIUM LACTATE, POTASSIUM CHLORIDE, CALCIUM CHLORIDE 600; 310; 30; 20 MG/100ML; MG/100ML; MG/100ML; MG/100ML
INJECTION, SOLUTION INTRAVENOUS CONTINUOUS PRN
Status: DISCONTINUED | OUTPATIENT
Start: 2021-08-20 | End: 2021-08-20 | Stop reason: SDUPTHER

## 2021-08-20 RX ORDER — MIDAZOLAM HYDROCHLORIDE 1 MG/ML
INJECTION INTRAMUSCULAR; INTRAVENOUS PRN
Status: DISCONTINUED | OUTPATIENT
Start: 2021-08-20 | End: 2021-08-20 | Stop reason: SDUPTHER

## 2021-08-20 RX ORDER — FENTANYL CITRATE 50 UG/ML
50 INJECTION, SOLUTION INTRAMUSCULAR; INTRAVENOUS
Status: DISCONTINUED | OUTPATIENT
Start: 2021-08-20 | End: 2021-08-20 | Stop reason: HOSPADM

## 2021-08-20 RX ORDER — LIDOCAINE HYDROCHLORIDE 10 MG/ML
1 INJECTION, SOLUTION EPIDURAL; INFILTRATION; INTRACAUDAL; PERINEURAL
Status: DISCONTINUED | OUTPATIENT
Start: 2021-08-20 | End: 2021-08-20 | Stop reason: HOSPADM

## 2021-08-20 RX ORDER — HYDROMORPHONE HYDROCHLORIDE 1 MG/ML
0.5 INJECTION, SOLUTION INTRAMUSCULAR; INTRAVENOUS; SUBCUTANEOUS EVERY 5 MIN PRN
Status: DISCONTINUED | OUTPATIENT
Start: 2021-08-20 | End: 2021-08-20 | Stop reason: HOSPADM

## 2021-08-20 RX ORDER — ACETAMINOPHEN AND CODEINE PHOSPHATE 300; 30 MG/1; MG/1
1 TABLET ORAL EVERY 6 HOURS PRN
Qty: 12 TABLET | Refills: 0 | Status: SHIPPED | OUTPATIENT
Start: 2021-08-20 | End: 2021-08-23

## 2021-08-20 RX ORDER — SCOLOPAMINE TRANSDERMAL SYSTEM 1 MG/1
1 PATCH, EXTENDED RELEASE TRANSDERMAL ONCE
Status: DISCONTINUED | OUTPATIENT
Start: 2021-08-20 | End: 2021-08-20 | Stop reason: HOSPADM

## 2021-08-20 RX ORDER — METOCLOPRAMIDE HYDROCHLORIDE 5 MG/ML
10 INJECTION INTRAMUSCULAR; INTRAVENOUS
Status: DISCONTINUED | OUTPATIENT
Start: 2021-08-20 | End: 2021-08-20 | Stop reason: HOSPADM

## 2021-08-20 RX ORDER — HYDROMORPHONE HYDROCHLORIDE 1 MG/ML
0.25 INJECTION, SOLUTION INTRAMUSCULAR; INTRAVENOUS; SUBCUTANEOUS EVERY 5 MIN PRN
Status: DISCONTINUED | OUTPATIENT
Start: 2021-08-20 | End: 2021-08-20 | Stop reason: HOSPADM

## 2021-08-20 RX ORDER — ONDANSETRON 2 MG/ML
INJECTION INTRAMUSCULAR; INTRAVENOUS PRN
Status: DISCONTINUED | OUTPATIENT
Start: 2021-08-20 | End: 2021-08-20 | Stop reason: SDUPTHER

## 2021-08-20 RX ORDER — LIDOCAINE HYDROCHLORIDE 10 MG/ML
INJECTION, SOLUTION EPIDURAL; INFILTRATION; INTRACAUDAL; PERINEURAL PRN
Status: DISCONTINUED | OUTPATIENT
Start: 2021-08-20 | End: 2021-08-20 | Stop reason: SDUPTHER

## 2021-08-20 RX ORDER — IBUPROFEN 800 MG/1
800 TABLET ORAL EVERY 8 HOURS PRN
Qty: 60 TABLET | Refills: 0 | Status: SHIPPED | OUTPATIENT
Start: 2021-08-20 | End: 2021-10-01

## 2021-08-20 RX ORDER — MORPHINE SULFATE 4 MG/ML
4 INJECTION, SOLUTION INTRAMUSCULAR; INTRAVENOUS EVERY 5 MIN PRN
Status: DISCONTINUED | OUTPATIENT
Start: 2021-08-20 | End: 2021-08-20 | Stop reason: HOSPADM

## 2021-08-20 RX ORDER — FENTANYL CITRATE 50 UG/ML
INJECTION, SOLUTION INTRAMUSCULAR; INTRAVENOUS PRN
Status: DISCONTINUED | OUTPATIENT
Start: 2021-08-20 | End: 2021-08-20 | Stop reason: SDUPTHER

## 2021-08-20 RX ORDER — DEXAMETHASONE SODIUM PHOSPHATE 10 MG/ML
INJECTION, SOLUTION INTRAMUSCULAR; INTRAVENOUS PRN
Status: DISCONTINUED | OUTPATIENT
Start: 2021-08-20 | End: 2021-08-20 | Stop reason: SDUPTHER

## 2021-08-20 RX ORDER — LABETALOL HYDROCHLORIDE 5 MG/ML
5 INJECTION, SOLUTION INTRAVENOUS EVERY 10 MIN PRN
Status: DISCONTINUED | OUTPATIENT
Start: 2021-08-20 | End: 2021-08-20 | Stop reason: HOSPADM

## 2021-08-20 RX ORDER — DIPHENHYDRAMINE HYDROCHLORIDE 50 MG/ML
12.5 INJECTION INTRAMUSCULAR; INTRAVENOUS
Status: DISCONTINUED | OUTPATIENT
Start: 2021-08-20 | End: 2021-08-20 | Stop reason: HOSPADM

## 2021-08-20 RX ORDER — SODIUM CHLORIDE 9 MG/ML
25 INJECTION, SOLUTION INTRAVENOUS PRN
Status: DISCONTINUED | OUTPATIENT
Start: 2021-08-20 | End: 2021-08-20 | Stop reason: HOSPADM

## 2021-08-20 RX ORDER — MEPERIDINE HYDROCHLORIDE 25 MG/ML
12.5 INJECTION INTRAMUSCULAR; INTRAVENOUS; SUBCUTANEOUS EVERY 5 MIN PRN
Status: DISCONTINUED | OUTPATIENT
Start: 2021-08-20 | End: 2021-08-20 | Stop reason: HOSPADM

## 2021-08-20 RX ORDER — HYDRALAZINE HYDROCHLORIDE 20 MG/ML
5 INJECTION INTRAMUSCULAR; INTRAVENOUS EVERY 10 MIN PRN
Status: DISCONTINUED | OUTPATIENT
Start: 2021-08-20 | End: 2021-08-20 | Stop reason: HOSPADM

## 2021-08-20 RX ORDER — MIDAZOLAM HYDROCHLORIDE 1 MG/ML
2 INJECTION INTRAMUSCULAR; INTRAVENOUS
Status: DISCONTINUED | OUTPATIENT
Start: 2021-08-20 | End: 2021-08-20 | Stop reason: HOSPADM

## 2021-08-20 RX ADMIN — PROPOFOL 150 MG: 10 INJECTION, EMULSION INTRAVENOUS at 11:41

## 2021-08-20 RX ADMIN — LIDOCAINE HYDROCHLORIDE 50 MG: 10 INJECTION, SOLUTION EPIDURAL; INFILTRATION; INTRACAUDAL; PERINEURAL at 11:41

## 2021-08-20 RX ADMIN — SODIUM CHLORIDE, POTASSIUM CHLORIDE, SODIUM LACTATE AND CALCIUM CHLORIDE: 600; 310; 30; 20 INJECTION, SOLUTION INTRAVENOUS at 11:37

## 2021-08-20 RX ADMIN — KETOROLAC TROMETHAMINE 15 MG: 30 INJECTION, SOLUTION INTRAMUSCULAR; INTRAVENOUS at 12:02

## 2021-08-20 RX ADMIN — FENTANYL CITRATE 25 MCG: 50 INJECTION, SOLUTION INTRAMUSCULAR; INTRAVENOUS at 11:41

## 2021-08-20 RX ADMIN — DEXAMETHASONE SODIUM PHOSPHATE 10 MG: 10 INJECTION, SOLUTION INTRAMUSCULAR; INTRAVENOUS at 11:56

## 2021-08-20 RX ADMIN — MIDAZOLAM 2 MG: 1 INJECTION INTRAMUSCULAR; INTRAVENOUS at 11:37

## 2021-08-20 RX ADMIN — ONDANSETRON HYDROCHLORIDE 4 MG: 2 INJECTION, SOLUTION INTRAMUSCULAR; INTRAVENOUS at 11:56

## 2021-08-20 ASSESSMENT — PAIN DESCRIPTION - DESCRIPTORS: DESCRIPTORS: CRAMPING

## 2021-08-20 ASSESSMENT — PAIN SCALES - GENERAL
PAINLEVEL_OUTOF10: 0
PAINLEVEL_OUTOF10: 1
PAINLEVEL_OUTOF10: 0

## 2021-08-20 ASSESSMENT — PAIN DESCRIPTION - PAIN TYPE: TYPE: SURGICAL PAIN

## 2021-08-20 ASSESSMENT — ENCOUNTER SYMPTOMS
ALLERGIC/IMMUNOLOGIC NEGATIVE: 1
GASTROINTESTINAL NEGATIVE: 1
RESPIRATORY NEGATIVE: 1
EYES NEGATIVE: 1

## 2021-08-20 ASSESSMENT — PAIN DESCRIPTION - LOCATION: LOCATION: ABDOMEN

## 2021-08-20 ASSESSMENT — PAIN DESCRIPTION - FREQUENCY: FREQUENCY: CONTINUOUS

## 2021-08-20 ASSESSMENT — PAIN DESCRIPTION - ORIENTATION: ORIENTATION: MID

## 2021-08-20 NOTE — H&P
STAS Abel is a 54 y.o. female with h/o PMB and endometrial thickening who presents for diagnostic hysteroscopy. She is doing well without complaints. Review of Systems   Constitutional: Negative. HENT: Negative. Eyes: Negative. Respiratory: Negative. Cardiovascular: Negative. Gastrointestinal: Negative. Endocrine: Negative. Genitourinary: Negative. Musculoskeletal: Negative. Skin: Negative. Allergic/Immunologic: Negative. Neurological: Negative. Hematological: Negative. Psychiatric/Behavioral: Negative. Past Medical History:   Diagnosis Date    Acne     Allergic rhinitis     Asthma     due to allergies    GERD (gastroesophageal reflux disease)     Hypertension     Palpitations     Vasovagal syncope      Past Surgical History:   Procedure Laterality Date    ADENOIDECTOMY      CHOLECYSTECTOMY      COLONOSCOPY      COLONOSCOPY N/A 4/23/2018    Dr HuizarYujxdv-tuqohilhthlcbh-TY x 2--5 yr recall    TONSILLECTOMY       Family History   Problem Relation Age of Onset    Heart Disease Other     Cancer Other     Colon Cancer Maternal Aunt     Colon Cancer Maternal Grandmother     Colon Polyps Neg Hx     Liver Cancer Neg Hx     Esophageal Cancer Neg Hx     Rectal Cancer Neg Hx     Stomach Cancer Neg Hx      Social History     Tobacco Use    Smoking status: Never Smoker    Smokeless tobacco: Never Used   Vaping Use    Vaping Use: Never used   Substance Use Topics    Alcohol use: No     Alcohol/week: 0.0 standard drinks    Drug use: No     No current facility-administered medications on file prior to encounter.      Current Outpatient Medications on File Prior to Encounter   Medication Sig Dispense Refill    omeprazole (PRILOSEC) 40 MG delayed release capsule Take 40 mg by mouth daily      loratadine (CLARITIN CHILDRENS) 5 MG chewable tablet Take 1 tablet by mouth daily 30 tablet 11    atenolol (TENORMIN) 25 MG tablet Take 1 tablet by mouth daily 30 tablet 11    miSOPROStol (CYTOTEC) 200 MCG tablet Place 1 tablet vaginally once for 1 dose The night before procedure 1 tablet 0    famotidine (PEPCID) 20 MG tablet Take 1 tablet by mouth nightly (Patient taking differently: Take 20 mg by mouth nightly as needed Not taking) 90 tablet 3    albuterol sulfate (PROAIR RESPICLICK) 431 (90 Base) MCG/ACT aerosol powder inhalation Inhale 2 puffs into the lungs every 6 hours as needed for Wheezing or Shortness of Breath 1 Inhaler 5     No Known Allergies  BP (!) 155/84   Pulse 94   Temp 98.2 °F (36.8 °C)   Resp 14   Ht 5' 3\" (1.6 m)   Wt 208 lb (94.3 kg)   LMP 07/28/2021 (Approximate)   SpO2 94%   BMI 36.85 kg/m²   Physical Exam  Constitutional:       General: She is not in acute distress. Appearance: Normal appearance. She is not ill-appearing or diaphoretic. HENT:      Head: Normocephalic and atraumatic. Nose: Nose normal. No rhinorrhea. Eyes:      General: No scleral icterus. Right eye: No discharge. Left eye: No discharge. Extraocular Movements: Extraocular movements intact. Pulmonary:      Effort: Pulmonary effort is normal. No respiratory distress. Musculoskeletal:         General: Normal range of motion. Skin:     Coloration: Skin is not pale. Findings: No erythema or rash. Neurological:      Mental Status: She is alert and oriented to person, place, and time. Psychiatric:         Attention and Perception: Attention and perception normal.         Mood and Affect: Mood and affect normal.         Speech: Speech normal.         Behavior: Behavior normal. Behavior is cooperative. Thought Content: Thought content normal.         Cognition and Memory: Cognition and memory normal.         Judgment: Judgment normal.           Assessment  1. PMB  2.  Endometrial thickening    Plan  To OR for Hysteroscopy, D&C.   Risks of bleeding, infection, injury to surrounding structures and uterine perforation which may lead to inability to complete the planned procedure was reviewed. Patient states understanding and consent signed.

## 2021-08-20 NOTE — OP NOTE
PREOPERATIVE DIAGNOSES:   1. PMB  2. Thickened endometrium    POSTOPERATIVE DIAGNOSES:   Same     PROCEDURE PERFORMED:    1. EUA  2. Hysteroscopy  3. Dilation and Curettage  4. Myosure Biopsies    ANESTHESIA:  General    SURGEON:  Dr. Mertie Opitz:  Debbie Benjamin     ESTIMATED BLOOD LOSS:  Less than 10 mL    COMPLICATIONS:  None    SPECIMENS:  Endometrial curettings    FINDINGS:  Numerous polypoid lesions filling the cavity. Thickened endometrium throughout. Distention deficit 150 mL    DESCRIPTION OF PROCEDURE:  The patient was taken to the operating room where she was placed under general anesthesia. She was positioned in dorsal lithotomy, prepped and draped in usual sterile fashion. A weighted speculum was placed into the vagina and the anterior lip of the cervix was grasped with a single-tooth tenaculum. The os was serially dilated to accommodate the hysteroscope which was placed without complication and the above findings were noted. Directed biopsies were taken under direct visualization. At this time, the hysteroscope and  tenaculum was removed from the anterior lip of the cervix. Tenaculum sites were found to be hemostatic. The procedure was concluded. The patient was awakened in the operating room and brought to PACU in stable condition.

## 2021-08-24 ENCOUNTER — TELEPHONE (OUTPATIENT)
Dept: OBGYN CLINIC | Age: 56
End: 2021-08-24

## 2021-08-24 NOTE — TELEPHONE ENCOUNTER
Called and informed pt that her surgical pathology indicates simple endometrial hyperplasia, diagnosis explained. Gave pt treatment options of medical management or a hysterectomy. Pt wishes to have a hysterectomy, TLH scheduled for 9-21-21, pre op appt made.

## 2021-08-24 NOTE — TELEPHONE ENCOUNTER
----- Message from Mi Dozier MD sent at 8/23/2021  7:40 PM CDT -----  Simple endometrial hyperplasia - medical management with progesterone and repeat biopsies vs [facility]

## 2021-09-09 ENCOUNTER — TELEPHONE (OUTPATIENT)
Dept: OBGYN CLINIC | Age: 56
End: 2021-09-09

## 2021-09-09 NOTE — TELEPHONE ENCOUNTER
Called and told pt we needed to reschedule her [address] with Selam Turpin. It has been rescheduled to 10-5-21. Pt v/u.

## 2021-09-13 ENCOUNTER — OFFICE VISIT (OUTPATIENT)
Dept: OBGYN CLINIC | Age: 56
End: 2021-09-13
Payer: COMMERCIAL

## 2021-09-13 VITALS
HEART RATE: 82 BPM | HEIGHT: 63 IN | DIASTOLIC BLOOD PRESSURE: 78 MMHG | WEIGHT: 205 LBS | SYSTOLIC BLOOD PRESSURE: 132 MMHG | BODY MASS INDEX: 36.32 KG/M2

## 2021-09-13 DIAGNOSIS — N95.0 POSTMENOPAUSAL BLEEDING: Primary | ICD-10-CM

## 2021-09-13 DIAGNOSIS — N85.01 SIMPLE ENDOMETRIAL HYPERPLASIA: ICD-10-CM

## 2021-09-13 PROCEDURE — 99214 OFFICE O/P EST MOD 30 MIN: CPT | Performed by: OBSTETRICS & GYNECOLOGY

## 2021-09-13 RX ORDER — GLIPIZIDE 5 MG/1
10 TABLET, FILM COATED, EXTENDED RELEASE ORAL 2 TIMES DAILY
COMMUNITY
Start: 2021-09-08

## 2021-09-13 RX ORDER — BLOOD-GLUCOSE,RECEIVER,CONT
EACH MISCELLANEOUS
COMMUNITY
Start: 2021-09-08

## 2021-09-13 ASSESSMENT — ENCOUNTER SYMPTOMS
GASTROINTESTINAL NEGATIVE: 1
RESPIRATORY NEGATIVE: 1
ALLERGIC/IMMUNOLOGIC NEGATIVE: 1
EYES NEGATIVE: 1

## 2021-09-13 NOTE — PROGRESS NOTES
Sarah Ahumada is a 64 y.o. who presents today for her pre op visit for a TLH with Hector Hood, cystoscopy on 10-5-21 for simple endometrial hyperplasia. Review of Systems   Constitutional: Negative. HENT: Negative. Eyes: Negative. Respiratory: Negative. Cardiovascular: Negative. Gastrointestinal: Negative. Endocrine: Negative. Genitourinary: Negative. Musculoskeletal: Negative. Skin: Negative. Allergic/Immunologic: Negative. Neurological: Negative. Hematological: Negative. Psychiatric/Behavioral: Negative.         Past Medical History:   Diagnosis Date    Acne     Allergic rhinitis     Asthma     due to allergies    Diabetes mellitus (HCC)     GERD (gastroesophageal reflux disease)     Hypertension     Palpitations     Vasovagal syncope        Past Surgical History:   Procedure Laterality Date    ADENOIDECTOMY      CHOLECYSTECTOMY      COLONOSCOPY      COLONOSCOPY N/A 4/23/2018    Dr HuizarSbwlma-kwphpknkgahazi-[place] x 2--5 yr recall    DILATION AND CURETTAGE OF UTERUS N/A 8/20/2021    EXAM UNDER ANESTHESIA, DILATATION AND CURETTAGE HYSTEROSCOPY, Cee Mac performed by Marycruz Bueno [facility] at [facility] OR    TONSILLECTOMY         Family History   Problem Relation Age of Onset    Heart Disease Other     Cancer Other     Colon Cancer Maternal Aunt     Colon Cancer Maternal Grandmother     Colon Polyps Neg Hx     Liver Cancer Neg Hx     Esophageal Cancer Neg Hx     Rectal Cancer Neg Hx     Stomach Cancer Neg Hx        Social History     Socioeconomic History    Marital status:      Spouse name: Not on file    Number of children: Not on file    Years of education: Not on file    Highest education level: Not on file   Occupational History    Not on file   Tobacco Use    Smoking status: Never Smoker    Smokeless tobacco: Never Used   Vaping Use    Vaping Use: Never used   Substance and Sexual Activity    Alcohol use: No     Alcohol/week: 0.0 standard drinks    Drug use: No    Sexual activity: Not on file   Other Topics Concern    Not on file   Social History Narrative    Not on file     Social Determinants of Health     Financial Resource Strain:     Difficulty of Paying Living Expenses:    Food Insecurity:     Worried About Running Out of Food in the Last Year:     [address] in the Last Year:    Transportation Needs:     Lack of Transportation (Medical):      Lack of Transportation (Non-Medical):    Physical Activity:     Days of Exercise per Week:     Minutes of Exercise per Session:    Stress:     Feeling of Stress :    Social Connections:     Frequency of Communication with Friends and Family:     Frequency of Social Gatherings with Friends and Family:     Attends Restoration[employer] Services:     Active Member of Clubs or Organizations:     Attends Club or Organization Meetings:     Marital Status:    Intimate Partner Violence:     Fear of Current or Ex-Partner:     Emotionally Abused:     Physically Abused:     Sexually Abused:          Current Outpatient Medications:     Continuous Blood Gluc  (DEXCOM G6 ) DORETHA, USE AS DIRECTED., Disp: , Rfl:     glipiZIDE (GLUCOTROL XL) 5 MG extended release tablet, TAKE 1 TABLET BY MOUTH DAILY, Disp: , Rfl:     ibuprofen (ADVIL;MOTRIN) 800 MG tablet, Take 1 tablet by mouth every 8 hours as needed for Pain, Disp: 60 tablet, Rfl: 0    famotidine (PEPCID) 20 MG tablet, Take 1 tablet by mouth nightly (Patient taking differently: Take 20 mg by mouth nightly as needed Not taking), Disp: 90 tablet, Rfl: 3    omeprazole (PRILOSEC) 40 MG delayed release capsule, Take 40 mg by mouth daily, Disp: , Rfl:     loratadine (CLARITIN CHILDRENS) 5 MG chewable tablet, Take 1 tablet by mouth daily, Disp: 30 tablet, Rfl: 11    atenolol (TENORMIN) 25 MG tablet, Take 1 tablet by mouth daily, Disp: 30 tablet, Rfl: 11    albuterol sulfate (PROAIR RESPICLICK) 302 (90 Base) MCG/ACT aerosol powder inhalation, Inhale 2 puffs into the lungs every 6 hours as needed for Wheezing or Shortness of Breath, Disp: 1 Inhaler, Rfl: 5    No Known Allergies    /78 (Site: Left Upper Arm, Position: Sitting, Cuff Size: Large Adult)   Pulse 82   Ht 5' 3\" (1.6 m)   Wt 205 lb (93 kg)   LMP 07/28/2021 (Approximate)   BMI 36.31 kg/m²   Physical Exam  Constitutional:       General: She is not in acute distress. Appearance: Normal appearance. She is not ill-appearing or diaphoretic. HENT:      Head: Normocephalic and atraumatic. Nose: Nose normal. No rhinorrhea. Eyes:      General: No scleral icterus. Right eye: No discharge. Left eye: No discharge. Extraocular Movements: Extraocular movements intact. Pulmonary:      Effort: Pulmonary effort is normal. No respiratory distress. Musculoskeletal:         General: Normal range of motion. Skin:     Coloration: Skin is not pale. Findings: No erythema or rash. Neurological:      Mental Status: She is alert and oriented to person, place, and time. Psychiatric:         Attention and Perception: Attention and perception normal.         Mood and Affect: Mood and affect normal.         Speech: Speech normal.         Behavior: Behavior normal. Behavior is cooperative. Thought Content: Thought content normal.         Cognition and Memory: Cognition and memory normal.         Judgment: Judgment normal.             FINAL DIAGNOSIS:     Uterus, endometrial curettage: Simple endometrial hyperplasia, negative   for evidence of atypia.    CBG/CBG     Assessment   Diagnosis Orders   1. Postmenopausal bleeding     2. Simple endometrial hyperplasia         Plan     1. Consent signed for Total Laparoscopic Hysterectomy, BSO, cystoscopy. Procedure and risks explained. Risks include bleeding, infection, injury to bowels, bladder and ureters. All questions answered. Information packet given.   2. RTC 2 weeks post op  USAMA Lee am

## 2021-09-13 NOTE — PATIENT INSTRUCTIONS
Patient Education        Laparoscopically Assisted Vaginal Hysterectomy: Before Your Surgery  What is a laparoscopically assisted vaginal hysterectomy? Laparoscopically assisted vaginal hysterectomy (LAVH) removes the uterus through the vagina. In some cases, the ovaries and fallopian tubes are taken out at the same time. The doctor makes one or more small cuts in the belly. These cuts are called incisions. They let the doctor insert tools to do the surgery. One of these tools is a tube with a light on it. It's called a laparoscope, or scope. The scope and the other tools allow the doctor to free the uterus. Then the doctor makes a small cut in the vagina. The uterus is taken out through this cut. After the surgery, you will not have periods. You won't be able to get pregnant. If there's a chance that you want to have a baby, talk to your doctor about treatment options. Some women go home the day of surgery. Others will stay in the hospital 1 to 2 days after surgery. You will need about 4 to 6 weeks to fully recover. The recovery time may be shorter for some people. Follow-up care is a key part of your treatment and safety. Be sure to make and go to all appointments, and call your doctor if you are having problems. It's also a good idea to know your test results and keep a list of the medicines you take. How do you prepare for surgery? Surgery can be stressful. This information will help you understand what you can expect. And it will help you safely prepare for surgery. Preparing for surgery    · Be sure you have someone to take you home. Anesthesia and pain medicine will make it unsafe for you to drive or get home on your own.     · Understand exactly what surgery is planned, along with the risks, benefits, and other options.     · If you take aspirin or some other blood thinner, ask your doctor if you should stop taking it before your surgery.  Make sure that you understand exactly what your doctor wants you to do. These medicines increase the risk of bleeding.     · Tell your doctor ALL the medicines, vitamins, supplements, and herbal remedies you take. Some may increase the risk of problems during your surgery. Your doctor will tell you if you should stop taking any of them before the surgery and how soon to do it.     · Make sure your doctor and the hospital have a copy of your advance directive. If you don't have one, you may want to prepare one. It lets others know your health care wishes. It's a good thing to have before any type of surgery or procedure. What happens on the day of surgery? · Follow the instructions exactly about when to stop eating and drinking. If you don't, your surgery may be canceled. If your doctor told you to take your medicines on the day of surgery, please take them with only a sip of water.     · Take a bath or shower before you come in for your surgery. Do not apply lotions, perfumes, deodorants, or nail polish.     · Do not shave the surgical site yourself.     · Take off all jewelry and piercings. And take out contact lenses, if you wear them. At the hospital or surgery center   · Bring a picture ID.     · You will be kept comfortable and safe by your anesthesia provider. You will be asleep during the surgery.     · The surgery will take about 2 to 4 hours. When should you call your doctor? · You have questions or concerns.     · You don't understand how to prepare for your surgery.     · You become ill before the surgery (such as fever, flu, or a cold).     · You need to reschedule or have changed your mind about having the surgery. Where can you learn more? Go to [email or web address]. org and sign in to your [employer] account. Enter D669 in the [employer] box to learn more about \"Laparoscopically Assisted Vaginal Hysterectomy: Before Your Surgery. \"     If you do not have an account, please click on the \"Sign Up Now\" link.   Current as of: February 11, 2021               Content Version: 12.9  © 8465-1136 [employer], Inc[employer]. Care instructions adapted under license by [facility] ([facility]). If you have questions about a medical condition or this instruction, always ask your healthcare professional. [address] any warranty or liability for your use of this information.

## 2021-10-01 ENCOUNTER — HOSPITAL ENCOUNTER (OUTPATIENT)
Dept: PREADMISSION TESTING | Age: 56
Discharge: HOME | End: 2021-10-05
Payer: COMMERCIAL

## 2021-10-01 VITALS — BODY MASS INDEX: 36.86 KG/M2 | HEIGHT: 63 IN | WEIGHT: 208 LBS

## 2021-10-01 LAB
ABO/RH: NORMAL
ANTIBODY SCREEN: NORMAL
SARS-COV-2, PCR: NOT DETECTED

## 2021-10-01 PROCEDURE — 86850 RBC ANTIBODY SCREEN: CPT

## 2021-10-01 PROCEDURE — U0005 INFEC AGEN DETEC AMPLI PROBE: HCPCS

## 2021-10-01 PROCEDURE — U0003 INFECTIOUS AGENT DETECTION BY NUCLEIC ACID (DNA OR RNA); SEVERE ACUTE RESPIRATORY SYNDROME CORONAVIRUS 2 (SARS-COV-2) (CORONAVIRUS DISEASE [COVID-19]), AMPLIFIED PROBE TECHNIQUE, MAKING USE OF HIGH THROUGHPUT TECHNOLOGIES AS DESCRIBED BY CMS-2020-01-R: HCPCS

## 2021-10-01 PROCEDURE — 86900 BLOOD TYPING SEROLOGIC ABO: CPT

## 2021-10-01 PROCEDURE — 86901 BLOOD TYPING SEROLOGIC RH(D): CPT

## 2021-10-01 RX ORDER — LORATADINE 10 MG/1
5 TABLET ORAL DAILY
COMMUNITY

## 2021-10-01 RX ORDER — IBUPROFEN 800 MG/1
400 TABLET ORAL EVERY 6 HOURS PRN
COMMUNITY

## 2021-10-01 RX ORDER — PHENAZOPYRIDINE HYDROCHLORIDE 100 MG/1
100 TABLET, FILM COATED ORAL ONCE
Status: CANCELLED | OUTPATIENT
Start: 2021-10-05

## 2021-10-01 RX ORDER — FAMOTIDINE 20 MG/1
20 TABLET, FILM COATED ORAL NIGHTLY PRN
COMMUNITY

## 2021-10-04 ENCOUNTER — TELEPHONE (OUTPATIENT)
Dept: OBGYN CLINIC | Age: 56
End: 2021-10-04

## 2021-10-04 ENCOUNTER — ANESTHESIA EVENT (OUTPATIENT)
Dept: OPERATING ROOM | Age: 56
End: 2021-10-04
Payer: COMMERCIAL

## 2021-10-04 NOTE — TELEPHONE ENCOUNTER
Called and spoke with pt. She is to arrive tomorrow at 6 AM for surgery at 730 AM. NPO after midnight and she has already started her bowel prep. She wanted to know what should she do if her blood sugar drops between now and surgery time. I told her I will ask Dr. Kyler Villalobos and send her a mychart.  Pt v/u

## 2021-10-05 ENCOUNTER — ANESTHESIA (OUTPATIENT)
Dept: OPERATING ROOM | Age: 56
End: 2021-10-05
Payer: COMMERCIAL

## 2021-10-05 ENCOUNTER — HOSPITAL ENCOUNTER (OUTPATIENT)
Age: 56
Setting detail: OUTPATIENT SURGERY
Discharge: HOME | End: 2021-10-05
Attending: OBSTETRICS & GYNECOLOGY | Admitting: OBSTETRICS & GYNECOLOGY
Payer: COMMERCIAL

## 2021-10-05 VITALS
OXYGEN SATURATION: 94 % | RESPIRATION RATE: 16 BRPM | DIASTOLIC BLOOD PRESSURE: 72 MMHG | HEIGHT: 63 IN | HEART RATE: 65 BPM | BODY MASS INDEX: 36.86 KG/M2 | TEMPERATURE: 98.7 F | WEIGHT: 208 LBS | SYSTOLIC BLOOD PRESSURE: 112 MMHG

## 2021-10-05 VITALS — SYSTOLIC BLOOD PRESSURE: 153 MMHG | TEMPERATURE: 95.6 F | OXYGEN SATURATION: 99 % | DIASTOLIC BLOOD PRESSURE: 81 MMHG

## 2021-10-05 DIAGNOSIS — Z90.710 S/P LAPAROSCOPIC HYSTERECTOMY: Primary | ICD-10-CM

## 2021-10-05 LAB
ABO/RH: NORMAL
ANTIBODY SCREEN: NORMAL
GLUCOSE BLD-MCNC: 149 MG/DL (ref 70–99)
HCG(URINE) PREGNANCY TEST: NEGATIVE
PERFORMED ON: ABNORMAL

## 2021-10-05 PROCEDURE — 86900 BLOOD TYPING SEROLOGIC ABO: CPT

## 2021-10-05 PROCEDURE — S2900 ROBOTIC SURGICAL SYSTEM: HCPCS | Performed by: OBSTETRICS & GYNECOLOGY

## 2021-10-05 PROCEDURE — 7100000000 HC PACU RECOVERY - FIRST 15 MIN: Performed by: OBSTETRICS & GYNECOLOGY

## 2021-10-05 PROCEDURE — 2720000010 HC SURG SUPPLY STERILE: Performed by: OBSTETRICS & GYNECOLOGY

## 2021-10-05 PROCEDURE — 7100000010 HC PHASE II RECOVERY - FIRST 15 MIN: Performed by: OBSTETRICS & GYNECOLOGY

## 2021-10-05 PROCEDURE — 2709999900 HC NON-CHARGEABLE SUPPLY: Performed by: OBSTETRICS & GYNECOLOGY

## 2021-10-05 PROCEDURE — 2580000003 HC RX 258

## 2021-10-05 PROCEDURE — 2500000003 HC RX 250 WO HCPCS: Performed by: ANESTHESIOLOGY

## 2021-10-05 PROCEDURE — 86901 BLOOD TYPING SEROLOGIC RH(D): CPT

## 2021-10-05 PROCEDURE — 2500000003 HC RX 250 WO HCPCS: Performed by: OBSTETRICS & GYNECOLOGY

## 2021-10-05 PROCEDURE — 6360000002 HC RX W HCPCS

## 2021-10-05 PROCEDURE — 2580000003 HC RX 258: Performed by: ANESTHESIOLOGY

## 2021-10-05 PROCEDURE — 58571 TLH W/T/O 250 G OR LESS: CPT | Performed by: OBSTETRICS & GYNECOLOGY

## 2021-10-05 PROCEDURE — 6360000002 HC RX W HCPCS: Performed by: OBSTETRICS & GYNECOLOGY

## 2021-10-05 PROCEDURE — 2500000003 HC RX 250 WO HCPCS

## 2021-10-05 PROCEDURE — 36415 COLL VENOUS BLD VENIPUNCTURE: CPT

## 2021-10-05 PROCEDURE — 3700000000 HC ANESTHESIA ATTENDED CARE: Performed by: OBSTETRICS & GYNECOLOGY

## 2021-10-05 PROCEDURE — 6370000000 HC RX 637 (ALT 250 FOR IP): Performed by: OBSTETRICS & GYNECOLOGY

## 2021-10-05 PROCEDURE — 6360000002 HC RX W HCPCS: Performed by: ANESTHESIOLOGY

## 2021-10-05 PROCEDURE — 82947 ASSAY GLUCOSE BLOOD QUANT: CPT

## 2021-10-05 PROCEDURE — 3600000009 HC SURGERY ROBOT BASE: Performed by: OBSTETRICS & GYNECOLOGY

## 2021-10-05 PROCEDURE — 88307 TISSUE EXAM BY PATHOLOGIST: CPT

## 2021-10-05 PROCEDURE — 3700000001 HC ADD 15 MINUTES (ANESTHESIA): Performed by: OBSTETRICS & GYNECOLOGY

## 2021-10-05 PROCEDURE — 7100000001 HC PACU RECOVERY - ADDTL 15 MIN: Performed by: OBSTETRICS & GYNECOLOGY

## 2021-10-05 PROCEDURE — 3600000019 HC SURGERY ROBOT ADDTL 15MIN: Performed by: OBSTETRICS & GYNECOLOGY

## 2021-10-05 PROCEDURE — 6370000000 HC RX 637 (ALT 250 FOR IP): Performed by: ANESTHESIOLOGY

## 2021-10-05 PROCEDURE — 86850 RBC ANTIBODY SCREEN: CPT

## 2021-10-05 PROCEDURE — 84703 CHORIONIC GONADOTROPIN ASSAY: CPT

## 2021-10-05 RX ORDER — LIDOCAINE HYDROCHLORIDE 10 MG/ML
INJECTION, SOLUTION EPIDURAL; INFILTRATION; INTRACAUDAL; PERINEURAL PRN
Status: DISCONTINUED | OUTPATIENT
Start: 2021-10-05 | End: 2021-10-05 | Stop reason: SDUPTHER

## 2021-10-05 RX ORDER — LABETALOL HYDROCHLORIDE 5 MG/ML
5 INJECTION, SOLUTION INTRAVENOUS EVERY 10 MIN PRN
Status: DISCONTINUED | OUTPATIENT
Start: 2021-10-05 | End: 2021-10-05 | Stop reason: HOSPADM

## 2021-10-05 RX ORDER — METOCLOPRAMIDE HYDROCHLORIDE 5 MG/ML
10 INJECTION INTRAMUSCULAR; INTRAVENOUS ONCE
Status: COMPLETED | OUTPATIENT
Start: 2021-10-05 | End: 2021-10-05

## 2021-10-05 RX ORDER — LIDOCAINE HYDROCHLORIDE 10 MG/ML
1 INJECTION, SOLUTION EPIDURAL; INFILTRATION; INTRACAUDAL; PERINEURAL
Status: DISCONTINUED | OUTPATIENT
Start: 2021-10-05 | End: 2021-10-05 | Stop reason: HOSPADM

## 2021-10-05 RX ORDER — PROMETHAZINE HYDROCHLORIDE 25 MG/ML
6.25 INJECTION, SOLUTION INTRAMUSCULAR; INTRAVENOUS
Status: DISCONTINUED | OUTPATIENT
Start: 2021-10-05 | End: 2021-10-05 | Stop reason: HOSPADM

## 2021-10-05 RX ORDER — OXYCODONE HYDROCHLORIDE AND ACETAMINOPHEN 5; 325 MG/1; MG/1
1 TABLET ORAL EVERY 6 HOURS PRN
Qty: 12 TABLET | Refills: 0 | Status: SHIPPED | OUTPATIENT
Start: 2021-10-05 | End: 2021-10-08

## 2021-10-05 RX ORDER — SODIUM CHLORIDE 0.9 % (FLUSH) 0.9 %
5-40 SYRINGE (ML) INJECTION EVERY 12 HOURS SCHEDULED
Status: DISCONTINUED | OUTPATIENT
Start: 2021-10-05 | End: 2021-10-05 | Stop reason: HOSPADM

## 2021-10-05 RX ORDER — ENALAPRILAT 2.5 MG/2ML
1.25 INJECTION INTRAVENOUS
Status: DISCONTINUED | OUTPATIENT
Start: 2021-10-05 | End: 2021-10-05 | Stop reason: HOSPADM

## 2021-10-05 RX ORDER — SODIUM CHLORIDE 9 MG/ML
25 INJECTION, SOLUTION INTRAVENOUS PRN
Status: DISCONTINUED | OUTPATIENT
Start: 2021-10-05 | End: 2021-10-05 | Stop reason: HOSPADM

## 2021-10-05 RX ORDER — PHENAZOPYRIDINE HYDROCHLORIDE 100 MG/1
100 TABLET, FILM COATED ORAL ONCE
Status: COMPLETED | OUTPATIENT
Start: 2021-10-05 | End: 2021-10-05

## 2021-10-05 RX ORDER — MORPHINE SULFATE 4 MG/ML
4 INJECTION, SOLUTION INTRAMUSCULAR; INTRAVENOUS EVERY 5 MIN PRN
Status: DISCONTINUED | OUTPATIENT
Start: 2021-10-05 | End: 2021-10-05 | Stop reason: HOSPADM

## 2021-10-05 RX ORDER — IBUPROFEN 800 MG/1
800 TABLET ORAL EVERY 8 HOURS PRN
Qty: 90 TABLET | Refills: 1 | Status: SHIPPED | OUTPATIENT
Start: 2021-10-05

## 2021-10-05 RX ORDER — ONDANSETRON 2 MG/ML
INJECTION INTRAMUSCULAR; INTRAVENOUS PRN
Status: DISCONTINUED | OUTPATIENT
Start: 2021-10-05 | End: 2021-10-05 | Stop reason: SDUPTHER

## 2021-10-05 RX ORDER — MORPHINE SULFATE 2 MG/ML
2 INJECTION, SOLUTION INTRAMUSCULAR; INTRAVENOUS EVERY 5 MIN PRN
Status: DISCONTINUED | OUTPATIENT
Start: 2021-10-05 | End: 2021-10-05 | Stop reason: HOSPADM

## 2021-10-05 RX ORDER — SODIUM CHLORIDE 0.9 % (FLUSH) 0.9 %
5-40 SYRINGE (ML) INJECTION PRN
Status: DISCONTINUED | OUTPATIENT
Start: 2021-10-05 | End: 2021-10-05 | Stop reason: HOSPADM

## 2021-10-05 RX ORDER — SODIUM CHLORIDE, SODIUM LACTATE, POTASSIUM CHLORIDE, CALCIUM CHLORIDE 600; 310; 30; 20 MG/100ML; MG/100ML; MG/100ML; MG/100ML
INJECTION, SOLUTION INTRAVENOUS CONTINUOUS
Status: DISCONTINUED | OUTPATIENT
Start: 2021-10-05 | End: 2021-10-05 | Stop reason: HOSPADM

## 2021-10-05 RX ORDER — ROCURONIUM BROMIDE 10 MG/ML
INJECTION, SOLUTION INTRAVENOUS PRN
Status: DISCONTINUED | OUTPATIENT
Start: 2021-10-05 | End: 2021-10-05 | Stop reason: SDUPTHER

## 2021-10-05 RX ORDER — SODIUM CHLORIDE 9 MG/ML
INJECTION, SOLUTION INTRAVENOUS CONTINUOUS
Status: DISCONTINUED | OUTPATIENT
Start: 2021-10-05 | End: 2021-10-05 | Stop reason: HOSPADM

## 2021-10-05 RX ORDER — FENTANYL CITRATE 50 UG/ML
50 INJECTION, SOLUTION INTRAMUSCULAR; INTRAVENOUS
Status: DISCONTINUED | OUTPATIENT
Start: 2021-10-05 | End: 2021-10-05 | Stop reason: HOSPADM

## 2021-10-05 RX ORDER — HYDROMORPHONE HYDROCHLORIDE 1 MG/ML
0.5 INJECTION, SOLUTION INTRAMUSCULAR; INTRAVENOUS; SUBCUTANEOUS EVERY 5 MIN PRN
Status: DISCONTINUED | OUTPATIENT
Start: 2021-10-05 | End: 2021-10-05 | Stop reason: HOSPADM

## 2021-10-05 RX ORDER — SUCCINYLCHOLINE CHLORIDE 20 MG/ML
INJECTION INTRAMUSCULAR; INTRAVENOUS PRN
Status: DISCONTINUED | OUTPATIENT
Start: 2021-10-05 | End: 2021-10-05 | Stop reason: SDUPTHER

## 2021-10-05 RX ORDER — FENTANYL CITRATE 50 UG/ML
25 INJECTION, SOLUTION INTRAMUSCULAR; INTRAVENOUS
Status: DISCONTINUED | OUTPATIENT
Start: 2021-10-05 | End: 2021-10-05 | Stop reason: HOSPADM

## 2021-10-05 RX ORDER — SCOLOPAMINE TRANSDERMAL SYSTEM 1 MG/1
1 PATCH, EXTENDED RELEASE TRANSDERMAL
Status: DISCONTINUED | OUTPATIENT
Start: 2021-10-05 | End: 2021-10-05 | Stop reason: HOSPADM

## 2021-10-05 RX ORDER — SODIUM CHLORIDE, SODIUM LACTATE, POTASSIUM CHLORIDE, CALCIUM CHLORIDE 600; 310; 30; 20 MG/100ML; MG/100ML; MG/100ML; MG/100ML
INJECTION, SOLUTION INTRAVENOUS CONTINUOUS PRN
Status: DISCONTINUED | OUTPATIENT
Start: 2021-10-05 | End: 2021-10-05 | Stop reason: SDUPTHER

## 2021-10-05 RX ORDER — HYDROMORPHONE HYDROCHLORIDE 1 MG/ML
0.25 INJECTION, SOLUTION INTRAMUSCULAR; INTRAVENOUS; SUBCUTANEOUS EVERY 5 MIN PRN
Status: DISCONTINUED | OUTPATIENT
Start: 2021-10-05 | End: 2021-10-05 | Stop reason: HOSPADM

## 2021-10-05 RX ORDER — MEPERIDINE HYDROCHLORIDE 25 MG/ML
12.5 INJECTION INTRAMUSCULAR; INTRAVENOUS; SUBCUTANEOUS EVERY 5 MIN PRN
Status: DISCONTINUED | OUTPATIENT
Start: 2021-10-05 | End: 2021-10-05 | Stop reason: HOSPADM

## 2021-10-05 RX ORDER — DIPHENHYDRAMINE HYDROCHLORIDE 50 MG/ML
12.5 INJECTION INTRAMUSCULAR; INTRAVENOUS
Status: DISCONTINUED | OUTPATIENT
Start: 2021-10-05 | End: 2021-10-05 | Stop reason: HOSPADM

## 2021-10-05 RX ORDER — HYDRALAZINE HYDROCHLORIDE 20 MG/ML
5 INJECTION INTRAMUSCULAR; INTRAVENOUS EVERY 10 MIN PRN
Status: DISCONTINUED | OUTPATIENT
Start: 2021-10-05 | End: 2021-10-05 | Stop reason: HOSPADM

## 2021-10-05 RX ORDER — MIDAZOLAM HYDROCHLORIDE 1 MG/ML
INJECTION INTRAMUSCULAR; INTRAVENOUS PRN
Status: DISCONTINUED | OUTPATIENT
Start: 2021-10-05 | End: 2021-10-05 | Stop reason: SDUPTHER

## 2021-10-05 RX ORDER — MIDAZOLAM HYDROCHLORIDE 1 MG/ML
2 INJECTION INTRAMUSCULAR; INTRAVENOUS
Status: DISCONTINUED | OUTPATIENT
Start: 2021-10-05 | End: 2021-10-05 | Stop reason: HOSPADM

## 2021-10-05 RX ORDER — METOCLOPRAMIDE HYDROCHLORIDE 5 MG/ML
10 INJECTION INTRAMUSCULAR; INTRAVENOUS
Status: DISCONTINUED | OUTPATIENT
Start: 2021-10-05 | End: 2021-10-05 | Stop reason: HOSPADM

## 2021-10-05 RX ORDER — OXYCODONE HYDROCHLORIDE AND ACETAMINOPHEN 5; 325 MG/1; MG/1
1 TABLET ORAL
Status: CANCELLED | OUTPATIENT
Start: 2021-10-05 | End: 2021-10-05

## 2021-10-05 RX ORDER — FENTANYL CITRATE 50 UG/ML
INJECTION, SOLUTION INTRAMUSCULAR; INTRAVENOUS PRN
Status: DISCONTINUED | OUTPATIENT
Start: 2021-10-05 | End: 2021-10-05 | Stop reason: SDUPTHER

## 2021-10-05 RX ORDER — PROPOFOL 10 MG/ML
INJECTION, EMULSION INTRAVENOUS PRN
Status: DISCONTINUED | OUTPATIENT
Start: 2021-10-05 | End: 2021-10-05 | Stop reason: SDUPTHER

## 2021-10-05 RX ORDER — BUPIVACAINE HYDROCHLORIDE AND EPINEPHRINE 2.5; 5 MG/ML; UG/ML
INJECTION, SOLUTION INFILTRATION; PERINEURAL PRN
Status: DISCONTINUED | OUTPATIENT
Start: 2021-10-05 | End: 2021-10-05 | Stop reason: ALTCHOICE

## 2021-10-05 RX ADMIN — MEPERIDINE HYDROCHLORIDE 12.5 MG: 25 INJECTION, SOLUTION INTRAMUSCULAR; INTRAVENOUS; SUBCUTANEOUS at 09:49

## 2021-10-05 RX ADMIN — MIDAZOLAM 2 MG: 1 INJECTION INTRAMUSCULAR; INTRAVENOUS at 07:40

## 2021-10-05 RX ADMIN — LIDOCAINE HYDROCHLORIDE 5 ML: 10 INJECTION, SOLUTION EPIDURAL; INFILTRATION; INTRACAUDAL; PERINEURAL at 07:47

## 2021-10-05 RX ADMIN — ROCURONIUM BROMIDE 20 MG: 10 INJECTION, SOLUTION INTRAVENOUS at 08:16

## 2021-10-05 RX ADMIN — METOCLOPRAMIDE 10 MG: 5 INJECTION, SOLUTION INTRAMUSCULAR; INTRAVENOUS at 06:48

## 2021-10-05 RX ADMIN — SUGAMMADEX 200 MG: 100 INJECTION, SOLUTION INTRAVENOUS at 09:07

## 2021-10-05 RX ADMIN — SODIUM CHLORIDE, SODIUM LACTATE, POTASSIUM CHLORIDE, AND CALCIUM CHLORIDE: 600; 310; 30; 20 INJECTION, SOLUTION INTRAVENOUS at 07:40

## 2021-10-05 RX ADMIN — ROCURONIUM BROMIDE 30 MG: 10 INJECTION, SOLUTION INTRAVENOUS at 08:01

## 2021-10-05 RX ADMIN — SUCCINYLCHOLINE CHLORIDE 120 MG: 20 INJECTION, SOLUTION INTRAMUSCULAR; INTRAVENOUS at 07:47

## 2021-10-05 RX ADMIN — SODIUM CHLORIDE, POTASSIUM CHLORIDE, SODIUM LACTATE AND CALCIUM CHLORIDE: 600; 310; 30; 20 INJECTION, SOLUTION INTRAVENOUS at 06:48

## 2021-10-05 RX ADMIN — ROCURONIUM BROMIDE 20 MG: 10 INJECTION, SOLUTION INTRAVENOUS at 07:47

## 2021-10-05 RX ADMIN — MEPERIDINE HYDROCHLORIDE 12.5 MG: 25 INJECTION, SOLUTION INTRAMUSCULAR; INTRAVENOUS; SUBCUTANEOUS at 09:42

## 2021-10-05 RX ADMIN — FENTANYL CITRATE 50 MCG: 50 INJECTION, SOLUTION INTRAMUSCULAR; INTRAVENOUS at 07:47

## 2021-10-05 RX ADMIN — Medication 2000 MG: at 07:40

## 2021-10-05 RX ADMIN — PHENAZOPYRIDINE HYDROCHLORIDE 100 MG: 100 TABLET ORAL at 06:35

## 2021-10-05 RX ADMIN — PROPOFOL 200 MG: 10 INJECTION, EMULSION INTRAVENOUS at 07:47

## 2021-10-05 RX ADMIN — ONDANSETRON HYDROCHLORIDE 4 MG: 2 INJECTION, SOLUTION INTRAMUSCULAR; INTRAVENOUS at 09:02

## 2021-10-05 RX ADMIN — FAMOTIDINE 20 MG: 10 INJECTION, SOLUTION INTRAVENOUS at 06:47

## 2021-10-05 ASSESSMENT — PAIN SCALES - GENERAL
PAINLEVEL_OUTOF10: 2
PAINLEVEL_OUTOF10: 6
PAINLEVEL_OUTOF10: 4
PAINLEVEL_OUTOF10: 0
PAINLEVEL_OUTOF10: 6

## 2021-10-05 ASSESSMENT — ENCOUNTER SYMPTOMS
EYES NEGATIVE: 1
ALLERGIC/IMMUNOLOGIC NEGATIVE: 1
GASTROINTESTINAL NEGATIVE: 1
RESPIRATORY NEGATIVE: 1

## 2021-10-05 ASSESSMENT — PAIN DESCRIPTION - LOCATION
LOCATION: ABDOMEN
LOCATION: ABDOMEN;THROAT
LOCATION: ABDOMEN
LOCATION: ABDOMEN

## 2021-10-05 ASSESSMENT — PAIN DESCRIPTION - PAIN TYPE
TYPE: SURGICAL PAIN
TYPE: ACUTE PAIN;SURGICAL PAIN
TYPE: SURGICAL PAIN
TYPE: SURGICAL PAIN

## 2021-10-05 ASSESSMENT — PAIN DESCRIPTION - DESCRIPTORS
DESCRIPTORS: ACHING

## 2021-10-05 ASSESSMENT — PAIN - FUNCTIONAL ASSESSMENT: PAIN_FUNCTIONAL_ASSESSMENT: 0-10

## 2021-10-05 NOTE — OP NOTE
PREOPERATIVE DIAGNOSES  1. PMB  2. Simple endometrial hyperplasia     POSTOPERATIVE DIAGNOSES  Same     PROCEDURES PERFORMED  1. Examination under anesthesia. 2.  Total laparoscopic hysterectomy. 3.  Bilateral salpingo-oophorectomy. 4.  Cystoscopy. ANESTHESIA  General.     ESTIMATED BLOOD LOSS  50 mL     IVF  1000 mL    UOP  975 mL    COMPLICATIONS  None. SPECIMENS  Uterus with cervix (150 g intraop) and bilateral tubes and ovaries. FINDINGS  On bimanual exam there is a 6 weeks size, anteverted, mobile uterus. Normal tubes and ovaries bilaterally. DESCRIPTION OF PROCEDURE   The patient was taken to the operating room where she was placed under  general anesthesia. She was positioned in dorsal lithotomy, prepped and  draped in the usual sterile fashion. A weighted speculum was then placed  into the vagina, and the anterior lip of the cervix was grasped with a  single-tooth tenaculum. The cervix was serially dilated to accommodate a  medium V-Care Uterine Manipulator, which was placed without complication. Attention was then turned to the abdomen where a supraumbilical incision was made. Veress needle was placed. Placement was confirmed with saline drop testand aspiration of air. Pneumoperitoneum was created. Two 8-mm ports were placed, 9 cm on each side of the umbilicus. An 8-mm assist port was placed in the LLQ. Bilateral infundibulopelvic ligaments were grasped, cauterized,and cut using the Vessel sealer. Next round ligaments were serially ligated bilaterally. Entrance was made into the anterior leaf of the broad ligament from each side to the midline and bladder flap was created. Bladder was displaced inferiorly. Bilateral uterine arteries were skeletonized,grasped, cauterized and cut using the Vessel sealer. Serial bites of the cardinal ligaments were taken until the cervix was appropriately exposed.      Colpotomy was made robotically beginning anteriorly and extended circumferentially about the manipulator until the uterus was amputated. Uterus was removed. Vaginal cuff was repaired robotically with quill suture in a running fashion. The pelvis was copiously irrigated removing all clots and debris. Vistaseal was placed over the cuff and pedicles with excellent hemostasis noted. Cystoscopy was performed with bilateral ureteral flux of urine noted. Bladder was noted to be intact. At this time, the procedure was concluded. The pneumoperitoneum was deflated. All incisions were repaired with 4-0 vicryl. and Dermabond. At this time, the procedure was concluded. The patient was awakened in the operating room, taken to recovery in stable condition.

## 2021-10-05 NOTE — H&P
STAS Iglesias is a 64 y.o. female with h/o PMB found to have endometrial hyperplasia who presents for definitive mangement. Review of Systems   Constitutional: Negative. HENT: Negative. Eyes: Negative. Respiratory: Negative. Cardiovascular: Negative. Gastrointestinal: Negative. Endocrine: Negative. Genitourinary: Negative. Musculoskeletal: Negative. Skin: Negative. Allergic/Immunologic: Negative. Neurological: Negative. Hematological: Negative. Psychiatric/Behavioral: Negative. Past Medical History:   Diagnosis Date    Acne     Allergic rhinitis     Asthma     due to allergies    Diabetes mellitus (HCC)     GERD (gastroesophageal reflux disease)     Hypertension     Palpitations     Vasovagal syncope      Past Surgical History:   Procedure Laterality Date    ADENOIDECTOMY      CHOLECYSTECTOMY      COLONOSCOPY      COLONOSCOPY N/A 4/23/2018    Dr HuizarVhfauu-uzyjtwrebwzwze-JA x 2--5 yr recall    DILATION AND CURETTAGE OF UTERUS N/A 8/20/2021    EXAM UNDER ANESTHESIA, DILATATION AND CURETTAGE HYSTEROSCOPY, Mayra Celaya performed by Magaly Vitale MD at [facility] OR    TONSILLECTOMY       Family History   Problem Relation Age of Onset    Heart Disease Other     Cancer Other     Colon Cancer Maternal Aunt     Colon Cancer Maternal Grandmother     Colon Polyps Neg Hx     Liver Cancer Neg Hx     Esophageal Cancer Neg Hx     Rectal Cancer Neg Hx     Stomach Cancer Neg Hx      Social History     Tobacco Use    Smoking status: Never Smoker    Smokeless tobacco: Never Used   Vaping Use    Vaping Use: Never used   Substance Use Topics    Alcohol use: No     Alcohol/week: 0.0 standard drinks    Drug use: No     No current facility-administered medications on file prior to encounter.      Current Outpatient Medications on File Prior to Encounter   Medication Sig Dispense Refill    omeprazole (PRILOSEC) 40 MG delayed release capsule Take 40 mg by mouth daily      atenolol (TENORMIN) 25 MG tablet Take 1 tablet by mouth daily 30 tablet 11    albuterol sulfate (PROAIR RESPICLICK) 411 (90 Base) MCG/ACT aerosol powder inhalation Inhale 2 puffs into the lungs every 6 hours as needed for Wheezing or Shortness of Breath 1 Inhaler 5     No Known Allergies  BP (!) 143/82   Pulse 75   Temp 98 °F (36.7 °C) (Temporal)   Resp 14   Ht 5' 3\" (1.6 m)   Wt 208 lb (94.3 kg)   LMP 07/28/2021 (Approximate)   SpO2 95%   BMI 36.85 kg/m²   Physical Exam  Constitutional:       General: She is not in acute distress. Appearance: Normal appearance. She is not ill-appearing or diaphoretic. HENT:      Head: Normocephalic and atraumatic. Nose: Nose normal. No rhinorrhea. Eyes:      General: No scleral icterus. Right eye: No discharge. Left eye: No discharge. Extraocular Movements: Extraocular movements intact. Pulmonary:      Effort: Pulmonary effort is normal. No respiratory distress. Musculoskeletal:         General: Normal range of motion. Skin:     Coloration: Skin is not pale. Findings: No erythema or rash. Neurological:      Mental Status: She is alert and oriented to person, place, and time. Psychiatric:         Attention and Perception: Attention and perception normal.         Mood and Affect: Mood and affect normal.         Speech: Speech normal.         Behavior: Behavior normal. Behavior is cooperative. Thought Content: Thought content normal.         Cognition and Memory: Cognition and memory normal.         Judgment: Judgment normal.       FINAL DIAGNOSIS:     Uterus, endometrial curettage: Simple endometrial hyperplasia, negative   for evidence of atypia.    CBG/CBG     Assessment  1. Endometrial hyperplasia  2. PMB    Plan  To OR for TLH, BSO, cysotscopy. Risks of TLH including bleeding, infection, injury to bowel/bladder/ureters and need for future surgery. Patient states understanding.   All questions answered. Consent signed.

## 2021-10-05 NOTE — ANESTHESIA POSTPROCEDURE EVALUATION
Department of Anesthesiology  Postprocedure Note    Patient: Shoaib Hester  MRN: [id number]  YOB: 1965  Date of evaluation: 10/5/2021  Time:  9:30 AM     Procedure Summary     Date: 10/05/21 Room / Location: [address]    Anesthesia Start: 0740 Anesthesia Stop: 0930    Procedure: TOTAL LAPAROSCOPIC HYSTERECTOMY WITH DAVINCI, BSO, CYSTOSCOPY, EXAM UNDER ANESTHESIA, (N/A ) Diagnosis: (SIMPLE ENDOMETRIAL HYPERPLASIA, PMB)    Surgeons: Seth Slade MD Responsible Provider: JESSICA Guallpa    Anesthesia Type: general ASA Status: 2          Anesthesia Type: general    Josefa Phase I: Josefa Score: 6    Josefa Phase II:      Last vitals: Reviewed and per EMR flowsheets.        Anesthesia Post Evaluation    Patient location during evaluation: PACU  Patient participation: complete - patient participated  Level of consciousness: sleepy but conscious  Pain score: 2  Airway patency: patent  Nausea & Vomiting: no vomiting and no nausea  Complications: no  Cardiovascular status: hemodynamically stable  Respiratory status: acceptable  Hydration status: stable

## 2021-10-05 NOTE — ANESTHESIA PRE PROCEDURE
Department of Anesthesiology  Preprocedure Note       Name:  Wendy Danielle   Age:  64 y.o.  :  1965                                          MRN:  [id number]         Date:  10/5/2021      Surgeon: Anjali Romo):  Kuldeep Billy MD    Procedure: Procedure(s):  TOTAL LAPAROSCOPIC HYSTERECTOMY WITH DAVINCI, BSO, CYSTOSCOPY, EXAM UNDER ANESTHESIA,    Medications prior to admission:   Prior to Admission medications    Medication Sig Start Date End Date Taking? Authorizing Provider   loratadine (CLARITIN) 10 MG tablet Take 5 mg by mouth daily    Historical Provider, MD   ibuprofen (ADVIL;MOTRIN) 800 MG tablet Take 400 mg by mouth every 6 hours as needed for Pain    Historical Provider, MD   famotidine (PEPCID) 20 MG tablet Take 20 mg by mouth nightly as needed Indications: Reflux Gastritis    Historical Provider, MD   Continuous Blood Gluc  (DEXCOM G6 ) DORETHA USE AS DIRECTED. 21   Historical Provider, MD   glipiZIDE (GLUCOTROL XL) 5 MG extended release tablet Take 10 mg by mouth 2 times daily  21   Historical Provider, MD   omeprazole (PRILOSEC) 40 MG delayed release capsule Take 40 mg by mouth daily    Historical Provider, MD   atenolol (TENORMIN) 25 MG tablet Take 1 tablet by mouth daily 18   ZIYAD Wallace   albuterol sulfate (PROAIR RESPICLICK) 313 (90 Base) MCG/ACT aerosol powder inhalation Inhale 2 puffs into the lungs every 6 hours as needed for Wheezing or Shortness of Breath 18   ZIYAD Wallace       Current medications:    No current facility-administered medications for this visit. No current outpatient medications on file.      Facility-Administered Medications Ordered in Other Visits   Medication Dose Route Frequency Provider Last Rate Last Admin    ceFAZolin (ANCEF) 2000 mg in 0.9% sodium chloride 50 mL IVPB  2,000 mg IntraVENous Once Kuldeep Billy MD           Allergies:  No Known Allergies    Problem List:    Patient Active Problem List   Diagnosis Code    Vasovagal syncope R55    Acute non-recurrent sinusitis J01.90    Mild intermittent asthma without complication O72.67    Dizziness R42    Otitis media with effusion H65.90    Family history of colon cancer Z80.0    Environmental allergies Z91.09    Gastroesophageal reflux disease without esophagitis K21.9    Transaminitis R74.01    Heartburn R12    Belching R14.2    Fatty liver K76.0    PMB (postmenopausal bleeding) N95.0    Thickened endometrium R93.89       Past Medical History:        Diagnosis Date    Acne     Allergic rhinitis     Asthma     due to allergies    Diabetes mellitus (HCC)     GERD (gastroesophageal reflux disease)     Hypertension     Palpitations     Vasovagal syncope        Past Surgical History:        Procedure Laterality Date    ADENOIDECTOMY      CHOLECYSTECTOMY      COLONOSCOPY      COLONOSCOPY N/A 4/23/2018    Dr HuizarIjsaet-rolhoxmukuxnmj-HD x 2--5 yr recall    DILATION AND CURETTAGE OF UTERUS N/A 8/20/2021    EXAM UNDER ANESTHESIA, DILATATION AND CURETTAGE HYSTEROSCOPY, [id number] performed by Viki Rahman MD at [place] [address]         Social History:    Social History     Tobacco Use    Smoking status: Never Smoker    Smokeless tobacco: Never Used   Substance Use Topics    Alcohol use: No     Alcohol/week: 0.0 standard drinks                                Counseling given: Not Answered      Vital Signs (Current): There were no vitals filed for this visit.                                            BP Readings from Last 3 Encounters:   10/05/21 (!) 143/82   09/13/21 132/78   08/20/21 (!) 148/85       NPO Status:                                                                                 BMI:   Wt Readings from Last 3 Encounters:   10/05/21 208 lb (94.3 kg)   10/01/21 208 lb (94.3 kg)   09/13/21 205 lb (93 kg)     There is no height or weight on file to calculate BMI.    CBC:   Lab Results   Component Value Date    WBC 7.3 08/18/2021 RBC 4.75 08/18/2021    HGB 12.7 08/18/2021    HCT 41.4 08/18/2021    MCV 87.2 08/18/2021    RDW 13.4 08/18/2021     08/18/2021       CMP:   Lab Results   Component Value Date     08/18/2021    K 4.8 08/18/2021    CL 99 08/18/2021    CO2 26 08/18/2021    BUN 10 08/18/2021    CREATININE 0.8 08/18/2021    GFRAA >59 08/18/2021    LABGLOM >60 08/18/2021    GLUCOSE 158 08/18/2021    PROT 7.6 08/18/2021    CALCIUM 9.5 08/18/2021    BILITOT 0.5 08/18/2021    ALKPHOS 67 08/18/2021     08/18/2021     08/18/2021       POC Tests:   Recent Labs     10/05/21  1290   POCGLU 149*       Coags: No results found for: PROTIME, INR, APTT    HCG (If Applicable):   Lab Results   Component Value Date    PREGTESTUR Negative 08/20/2021        ABGs: No results found for: PHART, PO2ART, TNF2CDL, WFK7URO, BEART, Z2LYULTO     Type & Screen (If Applicable):  No results found for: LABABO, LABRH    Drug/Infectious Status (If Applicable):  No results found for: HIV, HEPCAB    COVID-19 Screening (If Applicable):   Lab Results   Component Value Date    COVID19 Not Detected 10/01/2021           Anesthesia Evaluation  Patient summary reviewed and Nursing notes reviewed history of anesthetic complications (slow to wake up): Airway: Mallampati: II  TM distance: >3 FB   Neck ROM: full  Mouth opening: > = 3 FB Dental: normal exam         Pulmonary:normal exam    (+) asthma: allergic asthma, seasonal asthma,                            Cardiovascular:    (+) hypertension:,       ECG reviewed               Beta Blocker:  Dose within 24 Hrs         Neuro/Psych:      (-) seizures and CVA           GI/Hepatic/Renal:   (+) GERD: well controlled,           Endo/Other:        (-) diabetes mellitus               Abdominal:             Vascular: negative vascular ROS.          Other Findings:               Anesthesia Plan      general     ASA 2     (Pt will remove home glucose monitoring device from abdomen.)  Induction: intravenous. MIPS: Postoperative opioids intended and Prophylactic antiemetics administered. Anesthetic plan and risks discussed with patient.                       Peerless Resides, DO   10/5/2021

## 2021-10-18 ENCOUNTER — OFFICE VISIT (OUTPATIENT)
Dept: OBGYN CLINIC | Age: 56
End: 2021-10-18

## 2021-10-18 VITALS
SYSTOLIC BLOOD PRESSURE: 132 MMHG | HEIGHT: 63 IN | DIASTOLIC BLOOD PRESSURE: 80 MMHG | WEIGHT: 200 LBS | BODY MASS INDEX: 35.44 KG/M2

## 2021-10-18 DIAGNOSIS — Z09 POSTOPERATIVE FOLLOW-UP: Primary | ICD-10-CM

## 2021-10-18 PROCEDURE — 99024 POSTOP FOLLOW-UP VISIT: CPT | Performed by: OBSTETRICS & GYNECOLOGY

## 2021-10-18 ASSESSMENT — ENCOUNTER SYMPTOMS
EYES NEGATIVE: 1
GASTROINTESTINAL NEGATIVE: 1
RESPIRATORY NEGATIVE: 1

## 2021-10-18 NOTE — PROGRESS NOTES
Postoperative Follow-up: Patient presents for 2 week follow-up post TLH w Chaim Savers, BSO, CYSTOSCOPY for SIMPLE ENDOMETRIAL HYPERPLASIA, and PMB . Eating a regular diet without difficulty. Bowel movements are Abnormal.  The patient is not having any pain. She has had a couple days where she felt uncomfortable due to being up on her feet for a while. Pt states she has been taking Miralax to help her have a bowel movement. She has had 3-4 bowel movements since her surgery. She finally had a bowel movement this AM after 3 days, but it wasn't much she says.

## 2021-10-18 NOTE — PROGRESS NOTES
I, Nati Warren RN, am scribing for and in the presence of Dr. Marc Hastings 10/18/2021/3:50 PM/sign         10/18/2021    Theone Natacha (:  1965) is a 64 y.o. female, here for a post op visit. Post-Operative Follow-up: Patient here for post-op follow-up. Patient is 2 weeks status post TLH/BSO with DaVinci and cystoscopy. The patient reports no problems with eating, bowel movements, voiding, or their wound. The patient is not having any pain. Taking Miralax for bowel management and it is working well. Patient Active Problem List   Diagnosis    Vasovagal syncope    Acute non-recurrent sinusitis    Mild intermittent asthma without complication    Dizziness    Otitis media with effusion    Family history of colon cancer    Environmental allergies    Gastroesophageal reflux disease without esophagitis    Transaminitis    Heartburn    Belching    Fatty liver    PMB (postmenopausal bleeding)    Thickened endometrium    Simple endometrial hyperplasia without atypia       Review of Systems   Constitutional: Negative. HENT: Negative. Eyes: Negative. Respiratory: Negative. Cardiovascular: Negative. Gastrointestinal: Negative. Genitourinary: Negative for difficulty urinating, dyspareunia, dysuria, enuresis, frequency, hematuria, menstrual problem, pelvic pain, urgency and vaginal discharge. Musculoskeletal: Negative. Skin: Negative. Neurological: Negative. Psychiatric/Behavioral: Negative. Prior to Visit Medications    Medication Sig Taking?  Authorizing Provider   ibuprofen (ADVIL;MOTRIN) 800 MG tablet Take 1 tablet by mouth every 8 hours as needed for Pain Yes Alvarez Jimenez MD   loratadine (CLARITIN) 10 MG tablet Take 5 mg by mouth daily Yes Historical Provider, MD   ibuprofen (ADVIL;MOTRIN) 800 MG tablet Take 400 mg by mouth every 6 hours as needed for Pain Yes Historical Provider, MD   famotidine (PEPCID) 20 MG tablet Take 20 mg by mouth nightly as needed Indications: Reflux Gastritis Yes Historical Provider, MD   Continuous Blood Gluc  (DEXCOM G6 ) DORETHA USE AS DIRECTED. Yes Historical Provider, MD   glipiZIDE (GLUCOTROL XL) 5 MG extended release tablet Take 10 mg by mouth 2 times daily  Yes Historical Provider, MD   omeprazole (PRILOSEC) 40 MG delayed release capsule Take 40 mg by mouth daily Yes Historical Provider, MD   atenolol (TENORMIN) 25 MG tablet Take 1 tablet by mouth daily Yes ZIYAD Clemente   albuterol sulfate (PROAIR RESPICLICK) 644 (90 Base) MCG/ACT aerosol powder inhalation Inhale 2 puffs into the lungs every 6 hours as needed for Wheezing or Shortness of Breath Yes ZIYAD Franco        No Known Allergies    Past Medical History:   Diagnosis Date    Acne     Allergic rhinitis     Asthma     due to allergies    Diabetes mellitus ([facility] [address]     GERD (gastroesophageal reflux disease)     Hypertension     Palpitations     Vasovagal syncope        Past Surgical History:   Procedure Laterality Date    ADENOIDECTOMY      CHOLECYSTECTOMY      COLONOSCOPY      COLONOSCOPY N/A 4/23/2018    Dr HuizarLgqdef-jnezvpjngyippg-NM x 2--5 yr recall    DILATION AND CURETTAGE OF UTERUS N/A 8/20/2021    EXAM UNDER ANESTHESIA, DILATATION AND CURETTAGE HYSTEROSCOPY, [id number] performed by Velvet Ferrer MD at [address] N/A 10/5/2021    TOTAL LAPAROSCOPIC HYSTERECTOMY WITH DAVINCI, BSO, CYSTOSCOPY, EXAM UNDER ANESTHESIA, performed by Velvet Ferrer MD at [address] History     Socioeconomic History    Marital status:      Spouse name: Not on file    Number of children: Not on file    Years of education: Not on file    Highest education level: Not on file   Occupational History    Not on file   Tobacco Use    Smoking status: Never Smoker    Smokeless tobacco: Never Used   Vaping Use    Vaping Use: Never used   Substance and Sexual Activity    Alcohol use:  No Alcohol/week: 0.0 standard drinks    Drug use: No    Sexual activity: Not on file   Other Topics Concern    Not on file   Social History Narrative    Not on file     Social Determinants of Health     Financial Resource Strain:     Difficulty of Paying Living Expenses:    Food Insecurity:     Worried About Running Out of Food in the Last Year:     [address] in the Last Year:    Transportation Needs:     Lack of Transportation (Medical):  Lack of Transportation (Non-Medical):    Physical Activity:     Days of Exercise per Week:     Minutes of Exercise per Session:    Stress:     Feeling of Stress :    Social Connections:     Frequency of Communication with Friends and Family:     Frequency of Social Gatherings with Friends and Family:     Attends [employer] Services:     Active Member of Clubs or Organizations:     Attends Club or Organization Meetings:     Marital Status:    Intimate Partner Violence:     Fear of Current or Ex-Partner:     Emotionally Abused:     Physically Abused:     Sexually Abused:         Family History   Problem Relation Age of Onset    Heart Disease Other     Cancer Other     Colon Cancer Maternal Aunt     Colon Cancer Maternal Grandmother     Colon Polyps Neg Hx     Liver Cancer Neg Hx     Esophageal Cancer Neg Hx     Rectal Cancer Neg Hx     Stomach Cancer Neg Hx        ADVANCE DIRECTIVE: N, <no information>    Vitals:    10/18/21 1516   BP: 132/80   Weight: 200 lb (90.7 kg)   Height: 5' 3\" (1.6 m)     Estimated body mass index is 35.43 kg/m² as calculated from the following:    Height as of this encounter: 5' 3\" (1.6 m). Weight as of this encounter: 200 lb (90.7 kg). Physical Exam  Vitals and nursing note reviewed. Constitutional:       General: She is not in acute distress. Appearance: She is well-developed. She is not diaphoretic. HENT:      Head: Normocephalic and atraumatic.    Eyes:      Conjunctiva/sclera: Conjunctivae normal. Pupils: Pupils are equal, round, and reactive to light. Pulmonary:      Effort: Pulmonary effort is normal.   Abdominal:      Tenderness: There is no guarding. Comments: Incisions healing well   Musculoskeletal:         General: Normal range of motion. Cervical back: Normal range of motion. Comments: Normal ROM in all 4 extremities; normal gait   Skin:     General: Skin is warm and dry. Neurological:      Mental Status: She is alert and oriented to person, place, and time. Motor: No abnormal muscle tone. Coordination: Coordination normal.   Psychiatric:         Behavior: Behavior normal.         No flowsheet data found. Lab Results   Component Value Date    GLUCOSE 158 08/18/2021       The ASCVD Risk score (Lonza [place]., et al., 2013) failed to calculate for the following reasons:    Cannot find a previous HDL lab    Cannot find a previous total cholesterol lab    Immunization History   Administered Date(s) Administered    COVID-19, Moderna, PF, 100mcg/0.5mL 05/21/2021, 05/21/2021, 06/18/2021    Influenza Virus Vaccine 11/15/2020       Health Maintenance   Topic Date Due    Pneumococcal 0-64 years Vaccine (1 of 2 - PPSV23) Never done    HIV screen  Never done    DTaP/Tdap/Td vaccine (1 - Tdap) Never done    Cervical cancer screen  Never done    Lipid screen  Never done    Diabetes screen  Never done    Breast cancer screen  Never done    Shingles Vaccine (1 of 2) Never done    Flu vaccine (1) 09/01/2021    Colon cancer screen colonoscopy  04/23/2023    COVID-19 Vaccine  Completed    Hepatitis C screen  Completed    Hepatitis A vaccine  Aged Out    Hepatitis B vaccine  Aged Out    Hib vaccine  Aged Out    Meningococcal (ACWY) vaccine  Aged Out          ASSESSMENT/PLAN:  1. Postoperative follow-up    Counseling was offered and accepted by patient. The operative report and surgical findings were discussed with the patient. Continue Miralax prn.  She was instructed to gradually resume normal activity. She was also instructed to call or return to the office if complications occur. Patient voiced understanding of above. I, Dr. Lexus Mantilla, personally performed the services described in this documentation as scribed by Carol Beasley in my presence, and it is both accurate and complete. Return in about 4 weeks (around 11/15/2021), or if symptoms worsen or fail to improve, for post op. An electronic signature was used to authenticate this note.

## 2021-11-15 ENCOUNTER — OFFICE VISIT (OUTPATIENT)
Dept: OBGYN CLINIC | Age: 56
End: 2021-11-15
Payer: COMMERCIAL

## 2021-11-15 VITALS
WEIGHT: 200 LBS | BODY MASS INDEX: 35.44 KG/M2 | HEIGHT: 63 IN | DIASTOLIC BLOOD PRESSURE: 74 MMHG | SYSTOLIC BLOOD PRESSURE: 130 MMHG

## 2021-11-15 DIAGNOSIS — Z90.722 S/P BILATERAL SALPINGO-OOPHORECTOMY: ICD-10-CM

## 2021-11-15 DIAGNOSIS — Z90.710 S/P LAPAROSCOPIC HYSTERECTOMY: ICD-10-CM

## 2021-11-15 DIAGNOSIS — Z09 POSTOPERATIVE FOLLOW-UP: Primary | ICD-10-CM

## 2021-11-15 DIAGNOSIS — R39.15 URINARY URGENCY: ICD-10-CM

## 2021-11-15 DIAGNOSIS — R35.0 URINARY FREQUENCY: ICD-10-CM

## 2021-11-15 DIAGNOSIS — R35.1 EXCESSIVE URINATION AT NIGHT: ICD-10-CM

## 2021-11-15 PROCEDURE — 99213 OFFICE O/P EST LOW 20 MIN: CPT | Performed by: OBSTETRICS & GYNECOLOGY

## 2021-11-15 PROCEDURE — 99024 POSTOP FOLLOW-UP VISIT: CPT | Performed by: OBSTETRICS & GYNECOLOGY

## 2021-11-15 ASSESSMENT — ENCOUNTER SYMPTOMS
GASTROINTESTINAL NEGATIVE: 1
EYES NEGATIVE: 1
RESPIRATORY NEGATIVE: 1

## 2021-11-15 NOTE — PROGRESS NOTES
Drew Horton is a 64 y.o.  who presents today for her 6 week post op visit following a TLH with Cherise Jaquez on 10-5-21. Pt states she is having some pain, especially when she sits for a while. Pt states she is having trouble controlling her urine. States she is having urgency and wakes up several times per night to urinate. Review of Systems   Constitutional: Negative. HENT: Negative. Eyes: Negative. Respiratory: Negative. Cardiovascular: Negative. Gastrointestinal: Negative. Genitourinary: Negative. Negative for dysuria, frequency, menstrual problem, pelvic pain, urgency and vaginal discharge. Skin: Negative. Neurological: Negative. Psychiatric/Behavioral: Negative.         Past Medical History:   Diagnosis Date    Acne     Allergic rhinitis     Asthma     due to allergies    Diabetes mellitus (HCC)     GERD (gastroesophageal reflux disease)     Hypertension     Palpitations     Vasovagal syncope        Past Surgical History:   Procedure Laterality Date    ADENOIDECTOMY      CHOLECYSTECTOMY      COLONOSCOPY      COLONOSCOPY N/A 4/23/2018    Dr HuizarZwwoxi-ododjxvpgrgnxa-BC x 2--5 yr recall    DILATION AND CURETTAGE OF UTERUS N/A 8/20/2021    EXAM UNDER ANESTHESIA, DILATATION AND CURETTAGE HYSTEROSCOPY, [address] [address] aDwn performed by Steffen Blackwell MD at [facility] N/A 10/5/2021    TOTAL LAPAROSCOPIC HYSTERECTOMY WITH DAVINCI, BSO, CYSTOSCOPY, EXAM UNDER ANESTHESIA, performed by Steffen Blackwell MD at [facility] OR    TONSILLECTOMY         Family History   Problem Relation Age of Onset    Heart Disease Other     Cancer Other     Colon Cancer Maternal Aunt     Colon Cancer Maternal Grandmother     Colon Polyps Neg Hx     Liver Cancer Neg Hx     Esophageal Cancer Neg Hx     Rectal Cancer Neg Hx     Stomach Cancer Neg Hx        Social History     Socioeconomic History    Marital status:      Spouse name: Not on file    Number of children: Not on file    Years of education: Not on file    Highest education level: Not on file   Occupational History    Not on file   Tobacco Use    Smoking status: Never Smoker    Smokeless tobacco: Never Used   Vaping Use    Vaping Use: Never used   Substance and Sexual Activity    Alcohol use: No     Alcohol/week: 0.0 standard drinks    Drug use: No    Sexual activity: Not on file   Other Topics Concern    Not on file   Social History Narrative    Not on file     Social Determinants of Health     Financial Resource Strain:     Difficulty of Paying Living Expenses: Not on file   Food Insecurity:     Worried About Running Out of Food in the Last Year: Not on file    Ryan of Food in the Last Year: Not on file   Transportation Needs:     Lack of Transportation (Medical): Not on file    Lack of Transportation (Non-Medical):  Not on file   Physical Activity:     Days of Exercise per Week: Not on file    Minutes of Exercise per Session: Not on file   Stress:     Feeling of Stress : Not on file   Social Connections:     Frequency of Communication with Friends and Family: Not on file    Frequency of Social Gatherings with Friends and Family: Not on file    Attends Rastafari Services: Not on file    Active Member of [address] [place] [address] or Organizations: Not on file    Attends Club or Organization Meetings: Not on file    Marital Status: Not on file   Intimate Partner Violence:     Fear of Current or Ex-Partner: Not on file    Emotionally Abused: Not on file    Physically Abused: Not on file    Sexually Abused: Not on file   Housing Stability:     Unable to Pay for Housing in the Last Year: Not on file    Number of [place] in the Last Year: Not on file    Unstable Housing in the Last Year: Not on file         Current Outpatient Medications:     loratadine (CLARITIN) 10 MG tablet, Take 5 mg by mouth daily, Disp: , Rfl:     famotidine (PEPCID) 20 MG tablet, Take 20 mg by mouth nightly as needed Indications: Reflux Gastritis, Disp: , Rfl:     Continuous Blood Gluc  (DEXCOM G6 ) DORETHA, USE AS DIRECTED., Disp: , Rfl:     glipiZIDE (GLUCOTROL XL) 5 MG extended release tablet, Take 10 mg by mouth 2 times daily , Disp: , Rfl:     omeprazole (PRILOSEC) 40 MG delayed release capsule, Take 40 mg by mouth daily, Disp: , Rfl:     atenolol (TENORMIN) 25 MG tablet, Take 1 tablet by mouth daily, Disp: 30 tablet, Rfl: 11    albuterol sulfate (PROAIR RESPICLICK) 950 (90 Base) MCG/ACT aerosol powder inhalation, Inhale 2 puffs into the lungs every 6 hours as needed for Wheezing or Shortness of Breath, Disp: 1 Inhaler, Rfl: 5    ibuprofen (ADVIL;MOTRIN) 800 MG tablet, Take 1 tablet by mouth every 8 hours as needed for Pain (Patient not taking: Reported on 11/15/2021), Disp: 90 tablet, Rfl: 1    ibuprofen (ADVIL;MOTRIN) 800 MG tablet, Take 400 mg by mouth every 6 hours as needed for Pain (Patient not taking: Reported on 11/15/2021), Disp: , Rfl:     No Known Allergies    /74   Ht 5' 3\" (1.6 m)   Wt 200 lb (90.7 kg)   LMP 10/05/2021   BMI 35.43 kg/m²   Physical Exam  Constitutional:       General: She is not in acute distress. Appearance: She is well-developed. She is not diaphoretic. HENT:      Head: Normocephalic and atraumatic. Hair is normal.      Right Ear: Hearing and external ear normal. No decreased hearing noted. Left Ear: Hearing and external ear normal. No decreased hearing noted. Eyes:      General: No scleral icterus. Conjunctiva/sclera: Conjunctivae normal.      Pupils: Pupils are equal, round, and reactive to light. Pulmonary:      Effort: Pulmonary effort is normal. No respiratory distress. Abdominal:      General: There is no distension. Palpations: Abdomen is soft. There is no mass. Tenderness: There is no abdominal tenderness. There is no guarding or rebound. Genitourinary:     Labia:         Right: No rash, tenderness or lesion.          Left: No rash, tenderness or lesion. Vagina: Normal.      Adnexa:         Right: No mass, tenderness or fullness. Left: No mass, tenderness or fullness. Rectum: Normal.   Musculoskeletal:         General: No tenderness or deformity. Normal range of motion. Cervical back: Normal range of motion and neck supple. Skin:     General: Skin is warm and dry. Coloration: Skin is not pale. Findings: No erythema or rash. Neurological:      Mental Status: She is alert and oriented to person, place, and time. Cranial Nerves: No cranial nerve deficit. Psychiatric:         Speech: Speech normal.         Behavior: Behavior normal.         Thought Content: Thought content normal.         Judgment: Judgment normal.               Assessment   Diagnosis Orders   1. Postoperative follow-up     2. S/P laparoscopic hysterectomy     3. S/P bilateral salpingo-oophorectomy     4. Urinary frequency  Culture, Urine    [facility] [place], [place]    Culture, Urine   5. Urinary urgency  Culture, Urine    Culture, Urine   6. Excessive urination at night  [place] [place], [place]    Culture, Urine       Plan     1. May resume ADL's, no intercourse until 8 weeks post op  2. RTC one year or prn.   3. Referral [facility] for sleep study  I Cecilia Morin, am scribing for and in the presence of Dr. Mack Lott. I, Dr. Mack Lott, personally performed the services described in this documentation as scribed by Cecilia Morin in my presence, and it is both accurate and complete.

## 2021-11-17 ENCOUNTER — TELEPHONE (OUTPATIENT)
Dept: NEUROLOGY | Age: 56
End: 2021-11-17

## 2021-11-17 LAB — URINE CULTURE, ROUTINE: NORMAL

## 2021-11-17 NOTE — TELEPHONE ENCOUNTER
Received a referral for this patient. Called and spoke with patient to let her know when I have her scheduled an appointment with Servando Lugo. Patient is aware of the appointment time/date/location.

## 2021-12-06 ENCOUNTER — TELEMEDICINE (OUTPATIENT)
Dept: OBGYN CLINIC | Age: 56
End: 2021-12-06

## 2021-12-06 DIAGNOSIS — Z09 POSTOPERATIVE FOLLOW-UP: Primary | ICD-10-CM

## 2021-12-06 DIAGNOSIS — Z90.710 S/P LAPAROSCOPIC HYSTERECTOMY: ICD-10-CM

## 2021-12-06 PROCEDURE — 99024 POSTOP FOLLOW-UP VISIT: CPT | Performed by: OBSTETRICS & GYNECOLOGY

## 2021-12-28 ASSESSMENT — ENCOUNTER SYMPTOMS
EYES NEGATIVE: 1
GASTROINTESTINAL NEGATIVE: 1
RESPIRATORY NEGATIVE: 1
ALLERGIC/IMMUNOLOGIC NEGATIVE: 1

## 2021-12-28 NOTE — PROGRESS NOTES
2021    TELEHEALTH EVALUATION -- Audio/Visual (During BQOGF-29 public health emergency)    HPI:    Susie Forbes (:  1965) has requested an audio/video evaluation for the following concern(s):    Patient presents s/p [facility] for postop visit. She continues to do well without complaint. No pain. Bowel and bladder function normal.  No bleeding. Review of Systems   Constitutional: Negative. HENT: Negative. Eyes: Negative. Respiratory: Negative. Cardiovascular: Negative. Gastrointestinal: Negative. Endocrine: Negative. Genitourinary: Negative. Musculoskeletal: Negative. Skin: Negative. Allergic/Immunologic: Negative. Neurological: Negative. Hematological: Negative. Psychiatric/Behavioral: Negative.         Past Medical History:   Diagnosis Date    Acne     Allergic rhinitis     Asthma     due to allergies    Diabetes mellitus (HCC)     GERD (gastroesophageal reflux disease)     Hypertension     Palpitations     Vasovagal syncope        Past Surgical History:   Procedure Laterality Date    ADENOIDECTOMY      CHOLECYSTECTOMY      COLONOSCOPY      COLONOSCOPY N/A 2018    Dr HuizarYeufbd-rovailhprupyan-WX x 2--5 yr recall    DILATION AND CURETTAGE OF UTERUS N/A 2021    EXAM UNDER ANESTHESIA, DILATATION AND CURETTAGE HYSTEROSCOPY, Lanetta Girt performed by Masoud Bush MD at [facility] N/A 10/5/2021    TOTAL LAPAROSCOPIC HYSTERECTOMY WITH DAVINCI, BSO, CYSTOSCOPY, EXAM UNDER ANESTHESIA, performed by Masoud Bush MD at [facility] OR    TONSILLECTOMY         Family History   Problem Relation Age of Onset    Heart Disease Other     Cancer Other     Colon Cancer Maternal Aunt     Colon Cancer Maternal Grandmother     Colon Polyps Neg Hx     Liver Cancer Neg Hx     Esophageal Cancer Neg Hx     Rectal Cancer Neg Hx     Stomach Cancer Neg Hx        Social History     Tobacco Use    Smoking status: Never Smoker    Smokeless tobacco: Never Used   Vaping Use    Vaping Use: Never used   Substance Use Topics    Alcohol use: No     Alcohol/week: 0.0 standard drinks    Drug use: No       Prior to Visit Medications    Medication Sig Taking? Authorizing Provider   ibuprofen (ADVIL;MOTRIN) 800 MG tablet Take 1 tablet by mouth every 8 hours as needed for Pain  Patient not taking: Reported on 11/15/2021  Mortimer Howell, MD   loratadine (CLARITIN) 10 MG tablet Take 5 mg by mouth daily  Historical Provider, MD   ibuprofen (ADVIL;MOTRIN) 800 MG tablet Take 400 mg by mouth every 6 hours as needed for Pain  Patient not taking: Reported on 11/15/2021  Historical Provider, MD   famotidine (PEPCID) 20 MG tablet Take 20 mg by mouth nightly as needed Indications: Reflux Gastritis  Historical Provider, MD   Continuous Blood Gluc  (DEXCOM G6 ) DORETHA USE AS DIRECTED. Historical Provider, MD   glipiZIDE (GLUCOTROL XL) 5 MG extended release tablet Take 10 mg by mouth 2 times daily   Historical Provider, MD   omeprazole (PRILOSEC) 40 MG delayed release capsule Take 40 mg by mouth daily  Historical Provider, MD   atenolol (TENORMIN) 25 MG tablet Take 1 tablet by mouth daily  ZIYAD Clemente   albuterol sulfate (PROAIR RESPICLICK) 498 (90 Base) MCG/ACT aerosol powder inhalation Inhale 2 puffs into the lungs every 6 hours as needed for Wheezing or Shortness of Breath  ZIYAD Clemente       No Known Allergies          PHYSICAL EXAMINATION  Physical Exam  Constitutional:       General: She is not in acute distress. Appearance: Normal appearance. She is not ill-appearing or diaphoretic. HENT:      Head: Normocephalic and atraumatic. Nose: Nose normal. No rhinorrhea. Eyes:      General: No scleral icterus. Right eye: No discharge. Left eye: No discharge. Extraocular Movements: Extraocular movements intact. Pulmonary:      Effort: Pulmonary effort is normal. No respiratory distress.    Musculoskeletal: General: Normal range of motion. Skin:     Coloration: Skin is not pale. Findings: No erythema or rash. Neurological:      Mental Status: She is alert and oriented to person, place, and time. Psychiatric:         Attention and Perception: Attention and perception normal.         Mood and Affect: Mood and affect normal.         Speech: Speech normal.         Behavior: Behavior normal. Behavior is cooperative. Thought Content: Thought content normal.         Cognition and Memory: Cognition and memory normal.         Judgment: Judgment normal.           ASSESSMENT   Diagnosis Orders   1. Postoperative follow-up     2. S/P laparoscopic hysterectomy         PLAN  1. May resume all ADL  2. Continue Abstinence for 2 additional weeks  3. Return in 1 year    Kelby Overton is a 64 y.o. female being evaluated by a Virtual Visit (video visit) encounter to address concerns as mentioned above. A caregiver was present when appropriate. Due to this being a TeleHealth encounter (During VERWR-17 public health emergency), evaluation of the following organ systems was limited: Vitals/Constitutional/EENT/Resp/CV/GI//MS/Neuro/Skin/Heme-Lymph-Imm. Pursuant to the emergency declaration under the [address] [place] [address], [address] [place] [address] authority and the [employer] and [employer] Act, this Virtual Visit was conducted with patient's (and/or legal guardian's) consent, to reduce the patient's risk of exposure to COVID-19 and provide necessary medical care. The patient (and/or legal guardian) has also been advised to contact this office for worsening conditions or problems, and seek emergency medical treatment and/or call 911 if deemed necessary.      Patient identification was verified at the start of the visit: Yes    Total time spent on this encounter: Not billed by time    Services were provided through a video synchronous discussion virtually to substitute for in-person clinic visit. Patient and provider were located at their individual homes. --Bimal Winter MD on 12/28/2021 at 12:34 PM    An electronic signature was used to authenticate this note.

## 2024-01-30 NOTE — PATIENT INSTRUCTIONS
We are committed to providing you with the best care possible. In order to help us achieve these goals please remember to bring all medications, herbal products, and over the counter supplements with you to each visit. If your provider has ordered testing for you, please be sure to follow up with our office if you have not received results within 7 days after testing took place. *If you receive a survey after visiting one of our offices, please take the time to share your experience concerning your physician office visit. These surveys are confidential and no health information about you is shared. We are eager to improve for you and we are counting on your feedback to help make that happen. HPI:   80-year-old non smoker male past medical history of hypertension, hyperlipidemia, CAD, PVD on aspirin 81 mg daily presenting for evaluation of generalized weakness, difficulty ambulating since yesterday.  He feels dizzy / unsteady. Did not fall / lose consciousness Patient's wife at bedside states he has had fever this morning and cough. No sob / chest pain. Since arriving to the ED, he had 6 episodes of watery non-bloody diarrhea, He denies nausea/ vomiting / abdominal pain / decreased appetite. No recent antibiotic use. Wife reports fever of "100 point something" at home.     ED course:   In ED: BP: 110/53, HR: 88, RR: 17, temp: 98.6, spo2: 97% on 6L NC  Labs significant for WBC: 16.5K, creatinine 1.9 (baseline 1), K: 5.9  CXR: right sided opacities   Given lokelma, fluids, admitted to medicine    Hospital course:   Patient admitted to medicine for acute hypoxic respiratory failure and sepsis on admission likely secondary to community acquired pneumonia. Procal 0.14, RVP negative. Blood cultures negative. Legionella and strep urine negative. Patient was treated with IV azithromycin and ceftriaxone. Patient had single episode of hypotension that resolved following IV fluids, likely secondary to hypovolemia in the setting of diarrhea. Patient had no further episodes of diarrhea following day of admission. Appetite improved. Weaned off NC to room air with saturations above 95. Patient also had BOBBY on admission, creatinine 1.9 (baseline 1) that improved following IV fluids. Gabapentin dose was reduced to 300 daily from 300 TID. HPI:   80-year-old non smoker male past medical history of hypertension, hyperlipidemia, CAD, PVD on aspirin 81 mg daily presenting for evaluation of generalized weakness, difficulty ambulating since yesterday.  He feels dizzy / unsteady. Did not fall / lose consciousness Patient's wife at bedside states he has had fever this morning and cough. No sob / chest pain. Since arriving to the ED, he had 6 episodes of watery non-bloody diarrhea, He denies nausea/ vomiting / abdominal pain / decreased appetite. No recent antibiotic use. Wife reports fever of "100 point something" at home.     ED course:   In ED: BP: 110/53, HR: 88, RR: 17, temp: 98.6, spo2: 97% on 6L NC  Labs significant for WBC: 16.5K, creatinine 1.9 (baseline 1), K: 5.9  CXR: right sided opacities   Given lokelma, fluids, admitted to medicine    Hospital course:   Patient admitted to medicine for acute hypoxic respiratory failure and sepsis on admission likely secondary to community acquired pneumonia. Procal 0.14, RVP negative. Blood cultures negative. Legionella and strep urine negative. CT chest demonstrated bilateral patchy consolidative opacities as detailed above, likely infectious/inflammatory and dilated main pulmonary artery measuring 4 cm, which can be seen with pulmonary arterial hypertension. Patient was treated with IV azithromycin and ceftriaxone. Patient had single episode of hypotension that resolved following IV fluids, likely secondary to hypovolemia in the setting of diarrhea. Patient had no further episodes of diarrhea following day of admission. CT abdomen without acute findings, incidental finding of right adrenal gland 2 cm and left adrenal gland 1.8 cm lipid rich adenomas. Appetite improved. Weaned off NC to room air with saturations above 95. Patient also had BOBBY on admission, creatinine 1.9 (baseline 1) that improved following IV fluids. Gabapentin dose was reduced to 300 daily from 300 TID. Held home Lasix and lisinopril while in patient, will restart upon discharge. At time of discharge, patient was HDS, saturating well on RA. Walking saturations on RA were 93%. PT recommended rehab but patient and wife preferred for patient to go home w/ home PT vs outpatient PT. Rest of care as below. Recommended to follow up as outpatient for incidental findings on CT abdomen. Will discharge on oral course of Vantin 200 PO mg BID and azithromycin 500mg PO daily for a total of 7 days of antibiotics.    HPI:   80-year-old non smoker male past medical history of hypertension, hyperlipidemia, CAD, PVD on aspirin 81 mg daily presenting for evaluation of generalized weakness, difficulty ambulating since yesterday.  He feels dizzy / unsteady. Did not fall / lose consciousness Patient's wife at bedside states he has had fever this morning and cough. No sob / chest pain. Since arriving to the ED, he had 6 episodes of watery non-bloody diarrhea, He denies nausea/ vomiting / abdominal pain / decreased appetite. No recent antibiotic use. Wife reports fever of "100 point something" at home.     ED course:   In ED: BP: 110/53, HR: 88, RR: 17, temp: 98.6, spo2: 97% on 6L NC  Labs significant for WBC: 16.5K, creatinine 1.9 (baseline 1), K: 5.9  CXR: right sided opacities   Given lokelma, fluids, admitted to medicine    Hospital course:   Patient admitted to medicine for acute hypoxic respiratory failure and sepsis on admission likely secondary to community acquired pneumonia. Procal 0.14, RVP negative. Blood cultures negative. Legionella and strep urine negative. CT chest demonstrated bilateral patchy consolidative opacities as detailed above, likely infectious/inflammatory and dilated main pulmonary artery measuring 4 cm, which can be seen with pulmonary arterial hypertension. Patient was treated with IV azithromycin and ceftriaxone. Patient had single episode of hypotension that resolved following IV fluids, likely secondary to hypovolemia in the setting of diarrhea. Patient had no further episodes of diarrhea following day of admission. CT abdomen without acute findings, incidental finding of right adrenal gland 2 cm and left adrenal gland 1.8 cm lipid rich adenomas. Appetite improved. Weaned off NC to room air with saturations above 95. Patient also had BOBBY on admission, creatinine 1.9 (baseline 1) that improved following IV fluids. Gabapentin dose was reduced to 300 daily from 300 TID. Held home Lasix and lisinopril while in patient, will restart upon discharge as BP is elevated. At time of discharge, patient was HDS, saturating well on RA. Walking saturations on RA were 93%. PT recommended rehab but patient and wife preferred for patient to go home w/ home PT. Recommended to follow up as outpatient for incidental findings on CT abdomen. Will discharge on oral course of Vantin 200 PO mg BID and azithromycin 500mg PO daily for a total of 7 days of antibiotics.

## 2024-03-05 ENCOUNTER — OFFICE VISIT (OUTPATIENT)
Dept: SURGERY | Age: 59
End: 2024-03-05
Payer: COMMERCIAL

## 2024-03-05 ENCOUNTER — TELEPHONE (OUTPATIENT)
Dept: SURGERY | Age: 59
End: 2024-03-05

## 2024-03-05 VITALS
TEMPERATURE: 97.8 F | HEIGHT: 63 IN | WEIGHT: 187.8 LBS | OXYGEN SATURATION: 96 % | HEART RATE: 70 BPM | BODY MASS INDEX: 33.27 KG/M2

## 2024-03-05 DIAGNOSIS — N63.10 MASS OF RIGHT BREAST, UNSPECIFIED QUADRANT: Primary | ICD-10-CM

## 2024-03-05 PROCEDURE — 99204 OFFICE O/P NEW MOD 45 MIN: CPT | Performed by: SURGERY

## 2024-03-05 RX ORDER — EZETIMIBE 10 MG/1
10 TABLET ORAL DAILY
COMMUNITY

## 2024-03-05 RX ORDER — ESCITALOPRAM OXALATE 5 MG/1
5 TABLET ORAL DAILY
COMMUNITY

## 2024-03-05 ASSESSMENT — ENCOUNTER SYMPTOMS
COUGH: 0
CHEST TIGHTNESS: 0
DIARRHEA: 0
CONSTIPATION: 0
EYE REDNESS: 0
BACK PAIN: 0
NAUSEA: 0
ABDOMINAL DISTENTION: 0
COLOR CHANGE: 0
ABDOMINAL PAIN: 0
SORE THROAT: 0
SHORTNESS OF BREATH: 0
VOMITING: 0
EYE PAIN: 0

## 2024-03-05 NOTE — PROGRESS NOTES
area  of interest.    Findings:  Both physician and technologist scanned. At the 9 o'clock position in the right  breast, 6 cm from the nipple is a 2.1 x 1.0 x 1.5 cm oval hypoechoic mass.  Internal blood flow is seen with with color Doppler.  No acoustic shadowing.  This corresponds to the size and approximate location of the stellate mass seen on today's mammogram.    Impression:   ACR BI-RADS Category 4, suspicious for malignancy.  Recommend  Percutaneous ultrasound-guided biopsy.  Siva humphreys DT:02/22/24 08:31     Mammography   STUDY: 3D RIGHT DIAGNOSTIC YASH  DATE OF VISIT: 02/22/2024    HISTORY:                3D Screening mammogram./JS    Procedure:    Digital breast tomosynthesis was performed bilaterally, and  displayed in 2-D as well as 3-D format.  Comparison:   Comparison was made with previous studies.  Breast Density:      There are scattered parenchymal densities.  CAD:  CAD was reviewed, for any densities or calcifications flagged.  Findings:  Stellate density identified in the upper outer quadrant of the right breast.  This is located 10 cm from the nipple on the cc view.  A right diagnostic mammogram performed consisting of spot compression views.  Spot compression views confirm the presence of a stellate mass with architectural distortion in the upper outer quadrant of the right breast.  A targeted right breast ultrasound demonstrates a oval hypoechoic mass without acoustic shadowing at 9:00 o'clock in the right breast, 6 cm from the nipple.  On ultrasound this mass measures 2.1 x 1.0 cm.    Mammographic appearance of the left breast is benign.    Recommend:    Percutaneous ultrasound-guided biopsy of the hypoechoic mass  located laterally in the right breast, 9:00 o'clock by ultrasound.    Summary:BI-RADS Category 4--Suspicious for malignancy.  Further evaluation  is needed.  Siva angeles DT:02/22/24 08:19  FAXED TO Faisal Ely M.D. ON 02/22/24 AT 9:43 AM  Electronically

## 2024-03-08 ENCOUNTER — HOSPITAL ENCOUNTER (OUTPATIENT)
Dept: WOMENS IMAGING | Age: 59
Discharge: HOME | End: 2024-03-08
Payer: COMMERCIAL

## 2024-03-08 DIAGNOSIS — N63.10 MASS OF RIGHT BREAST, UNSPECIFIED QUADRANT: ICD-10-CM

## 2024-03-08 DIAGNOSIS — R92.8 ABNORMAL MAMMOGRAM: ICD-10-CM

## 2024-03-08 PROCEDURE — 19083 BX BREAST 1ST LESION US IMAG: CPT

## 2024-03-08 PROCEDURE — 77065 DX MAMMO INCL CAD UNI: CPT

## 2024-03-11 ENCOUNTER — TELEPHONE (OUTPATIENT)
Dept: SURGERY | Age: 59
End: 2024-03-11

## 2024-03-11 DIAGNOSIS — C50.919 INFILTRATING DUCT CARCINOMA (HCC): Primary | ICD-10-CM

## 2024-03-11 NOTE — TELEPHONE ENCOUNTER
Reviewed patient's pathology. Ordered for MRI and Oncology discussion.    Velvet Cason DO   03/11/24   1:32 PM

## 2024-03-12 ENCOUNTER — TELEPHONE (OUTPATIENT)
Dept: SURGERY | Age: 59
End: 2024-03-12

## 2024-03-12 ENCOUNTER — TELEPHONE (OUTPATIENT)
Dept: OTHER | Age: 59
End: 2024-03-12

## 2024-03-12 DIAGNOSIS — Z80.0 FAMILY HISTORY OF MALIGNANT NEOPLASM OF DIGESTIVE ORGAN: ICD-10-CM

## 2024-03-12 DIAGNOSIS — Z85.3 PERSONAL HISTORY OF MALIGNANT NEOPLASM OF BREAST: ICD-10-CM

## 2024-03-12 NOTE — TELEPHONE ENCOUNTER
Reached out to patient via telephone call to offer Nurse Navigator services. We spoke at length about navigator services and I offered to mail a Breast Cancer Treatment Handbook to her and she agreed that would be good.  I included a business card with contact information, [employer] flyer, [employer] information and an introduction letter.  I encouraged her to reach out at anytime with questions or concerns.  Appointment times, dates and location reviewed.  Reviewed patient and family history.  Offered genetic testing after review and patient accepted.  Patient will have blood collected.  Orders placed.  My chart message sent.  All questions answered and will follow up after appointment with Dr. Garcia.

## 2024-03-12 NOTE — TELEPHONE ENCOUNTER
Called patient and left the following appt information on her vm.  Patient scheduled to have MRI on 03/18/24 arrive at 2:30 for 3:00 appt.  NPO 1 hour prior and no heavy meals prior to that.  Patient instructed not to wear any metal or jewelry and to bring photo ID and insurance card to appt.  Patient given instructions as to location of MRI center.

## 2024-03-18 ENCOUNTER — HOSPITAL ENCOUNTER (OUTPATIENT)
Dept: MRI IMAGING | Age: 59
Discharge: HOME | End: 2024-03-18
Attending: SURGERY
Payer: COMMERCIAL

## 2024-03-18 ENCOUNTER — TELEPHONE (OUTPATIENT)
Dept: HEMATOLOGY | Age: 59
End: 2024-03-18

## 2024-03-18 DIAGNOSIS — C50.919 INFILTRATING DUCT CARCINOMA (HCC): ICD-10-CM

## 2024-03-18 PROCEDURE — C8908 MRI W/O FOL W/CONT, BREAST,: HCPCS

## 2024-03-18 NOTE — TELEPHONE ENCOUNTER
Called pt. to remind them of appointment on 03/20/2024 and had to leave a detailed voicemail with appointment date and time.

## 2024-03-19 NOTE — PROGRESS NOTES
Lana Smith MD at [facility] OR    HYSTERECTOMY (CERVIX STATUS UNKNOWN) N/A 10/5/2021    TOTAL LAPAROSCOPIC HYSTERECTOMY WITH SERENITY GARCIA, CYSTOSCOPY, EXAM UNDER ANESTHESIA, performed by Lana Smith MD at [facility] OR    TONSILLECTOMY      US BREAST BIOPSY W LOC DEVICE 1ST LESION RIGHT Right 3/8/2024    US BREAST BIOPSY W LOC DEVICE 1ST LESION RIGHT 3/8/2024 [facility]       Social History:    Marital status:   Smoking status:No  ETOH status:Ocassionally  Resides: [place], [place]    Family History:   Family History   Problem Relation Age of Onset    Cancer Mother         Melanoma    Melanoma Mother     No Known Problems Father     Colon Cancer Maternal Aunt 75    Cancer Maternal Uncle         Colon mets to brain    Breast Cancer Maternal Grandmother         Colon mets to brain    Colon Cancer Maternal Grandmother 80    No Known Problems Maternal Grandfather     No Known Problems Paternal Grandmother     No Known Problems Paternal Grandfather     Heart Disease Other     Colon Polyps Neg Hx     Liver Cancer Neg Hx     Esophageal Cancer Neg Hx     Rectal Cancer Neg Hx     Stomach Cancer Neg Hx    Current Hospital Medications:    Current Outpatient Medications   Medication Sig Dispense Refill    ezetimibe (ZETIA) 10 MG tablet Take 1 tablet by mouth daily      escitalopram (LEXAPRO) 5 MG tablet Take 1 tablet by mouth daily      empagliflozin (JARDIANCE) 25 MG tablet Take 1 tablet by mouth daily      loratadine (CLARITIN) 10 MG tablet Take 0.5 tablets by mouth daily      omeprazole (PRILOSEC) 40 MG delayed release capsule Take 1 capsule by mouth daily      atenolol (TENORMIN) 25 MG tablet Take 1 tablet by mouth daily 30 tablet 11    albuterol sulfate (PROAIR RESPICLICK) 108 (90 Base) MCG/ACT aerosol powder inhalation Inhale 2 puffs into the lungs every 6 hours as needed for Wheezing or Shortness of Breath 1 Inhaler 5     No current facility-administered medications for this visit.

## 2024-03-20 ENCOUNTER — HOSPITAL ENCOUNTER (OUTPATIENT)
Dept: INFUSION THERAPY | Age: 59
Discharge: HOME | End: 2024-03-20
Payer: COMMERCIAL

## 2024-03-20 ENCOUNTER — OFFICE VISIT (OUTPATIENT)
Dept: HEMATOLOGY | Age: 59
End: 2024-03-20
Payer: COMMERCIAL

## 2024-03-20 VITALS
DIASTOLIC BLOOD PRESSURE: 82 MMHG | WEIGHT: 185 LBS | SYSTOLIC BLOOD PRESSURE: 138 MMHG | HEART RATE: 103 BPM | TEMPERATURE: 98.5 F | BODY MASS INDEX: 32.78 KG/M2 | HEIGHT: 63 IN | OXYGEN SATURATION: 96 %

## 2024-03-20 DIAGNOSIS — F41.1 ANXIETY ASSOCIATED WITH CANCER DIAGNOSIS (HCC): ICD-10-CM

## 2024-03-20 DIAGNOSIS — Z80.0 FAMILY HISTORY OF COLON CANCER: ICD-10-CM

## 2024-03-20 DIAGNOSIS — Z71.89 COORDINATION OF COMPLEX CARE: ICD-10-CM

## 2024-03-20 DIAGNOSIS — Z71.89 CARE PLAN DISCUSSED WITH PATIENT: ICD-10-CM

## 2024-03-20 DIAGNOSIS — C50.911 INFILTRATING DUCTAL CARCINOMA OF RIGHT BREAST (HCC): Primary | ICD-10-CM

## 2024-03-20 DIAGNOSIS — C80.1 ANXIETY ASSOCIATED WITH CANCER DIAGNOSIS (HCC): ICD-10-CM

## 2024-03-20 PROCEDURE — 99212 OFFICE O/P EST SF 10 MIN: CPT

## 2024-03-20 PROCEDURE — 99205 OFFICE O/P NEW HI 60 MIN: CPT | Performed by: INTERNAL MEDICINE

## 2024-03-20 ASSESSMENT — PROMIS GLOBAL HEALTH SCALE
IN GENERAL, HOW WOULD YOU RATE YOUR SATISFACTION WITH YOUR SOCIAL ACTIVITIES AND RELATIONSHIPS [ON A SCALE OF 1 (POOR) TO 5 (EXCELLENT)]?: GOOD
IN GENERAL, WOULD YOU SAY YOUR HEALTH IS...[ON A SCALE OF 1 (POOR) TO 5 (EXCELLENT)]: GOOD
IN THE PAST 7 DAYS, HOW WOULD YOU RATE YOUR FATIGUE ON AVERAGE [ON A SCALE FROM 1 (NONE) TO 5 (VERY SEVERE)]?: MODERATE
SUM OF RESPONSES TO QUESTIONS 2, 4, 5, & 10: 11
TO WHAT EXTENT ARE YOU ABLE TO CARRY OUT YOUR EVERYDAY PHYSICAL ACTIVITIES SUCH AS WALKING, CLIMBING STAIRS, CARRYING GROCERIES, OR MOVING A CHAIR [ON A SCALE OF 1 (NOT AT ALL) TO 5 (COMPLETELY)]?: MOSTLY
IN GENERAL, HOW WOULD YOU RATE YOUR MENTAL HEALTH, INCLUDING YOUR MOOD AND YOUR ABILITY TO THINK [ON A SCALE OF 1 (POOR) TO 5 (EXCELLENT)]?: FAIR
IN GENERAL, WOULD YOU SAY YOUR QUALITY OF LIFE IS...[ON A SCALE OF 1 (POOR) TO 5 (EXCELLENT)]: GOOD
IN GENERAL, HOW WOULD YOU RATE YOUR PHYSICAL HEALTH [ON A SCALE OF 1 (POOR) TO 5 (EXCELLENT)]?: GOOD
IN GENERAL, PLEASE RATE HOW WELL YOU CARRY OUT YOUR USUAL SOCIAL ACTIVITIES (INCLUDES ACTIVITIES AT HOME, AT WORK, AND IN YOUR COMMUNITY, AND RESPONSIBILITIES AS A PARENT, CHILD, SPOUSE, EMPLOYEE, FRIEND, ETC) [ON A SCALE OF 1 (POOR) TO 5 (EXCELLENT)]?: GOOD
SUM OF RESPONSES TO QUESTIONS 3, 6, 7, & 8: 14
IN THE PAST 7 DAYS, HOW WOULD YOU RATE YOUR PAIN ON AVERAGE [ON A SCALE FROM 0 (NO PAIN) TO 10 (WORST IMAGINABLE PAIN)]?: 4

## 2024-03-26 ENCOUNTER — OFFICE VISIT (OUTPATIENT)
Dept: SURGERY | Age: 59
End: 2024-03-26
Payer: COMMERCIAL

## 2024-03-26 DIAGNOSIS — C50.919 INFILTRATING DUCT CARCINOMA (HCC): Primary | ICD-10-CM

## 2024-03-26 PROCEDURE — 99214 OFFICE O/P EST MOD 30 MIN: CPT | Performed by: SURGERY

## 2024-03-26 RX ORDER — CELECOXIB 200 MG/1
200 CAPSULE ORAL ONCE
OUTPATIENT
Start: 2024-03-26 | End: 2024-03-26

## 2024-03-26 RX ORDER — SODIUM CHLORIDE 0.9 % (FLUSH) 0.9 %
5-40 SYRINGE (ML) INJECTION EVERY 12 HOURS SCHEDULED
OUTPATIENT
Start: 2024-03-26

## 2024-03-26 RX ORDER — SODIUM CHLORIDE 9 MG/ML
INJECTION, SOLUTION INTRAVENOUS PRN
OUTPATIENT
Start: 2024-03-26

## 2024-03-26 RX ORDER — ACETAMINOPHEN 325 MG/1
1000 TABLET ORAL ONCE
OUTPATIENT
Start: 2024-03-26 | End: 2024-03-26

## 2024-03-26 RX ORDER — SODIUM CHLORIDE 0.9 % (FLUSH) 0.9 %
5-40 SYRINGE (ML) INJECTION PRN
OUTPATIENT
Start: 2024-03-26

## 2024-03-26 RX ORDER — SODIUM CHLORIDE, SODIUM LACTATE, POTASSIUM CHLORIDE, CALCIUM CHLORIDE 600; 310; 30; 20 MG/100ML; MG/100ML; MG/100ML; MG/100ML
INJECTION, SOLUTION INTRAVENOUS CONTINUOUS
OUTPATIENT
Start: 2024-03-26

## 2024-03-26 NOTE — PROGRESS NOTES
HISTORY OF PRESENT ILLNESS:    Ms. Rodriguez  is recently status post ultrasound guided breast biopsy  on the right which revealed a 2  cm intermediate grade ER positive and HER 2 negative invasive ductal carcinoma.      DISCUSSION:  I had a lengthy discussion with Ms. Rodriguez  about the ramifications of the diagnosis of breast cancer.  We discussed the pathophysiology of cancer in general and also the ways in which surgery, radiation therapy, and chemotherapy are utilized in the treatment of different types of cancers.  We also explained how these modalities related to her situation in particular.  We discussed breast MRI and how it assists in evaluation of breast cancers and the results of her MRI if done.      MRI  IMPRESSION:  1.  Biopsy-proven malignancy in the right breast measures up to 1.9 cm as described above.  2.  No MRI evidence of malignancy in the left breast.     3. Small hiatal hernia with thickening and some irregularity of the distal esophagus. Consider further evaluation with endoscopy.     BI-RADS 6 - KNOWN MALIGNANCY     Recommendation: Recommend continued surgical/oncologic management  _____________________________________   Electronically signed by: JUAN A PRO M.D.  Date:     03/19/2024  Time:    17:36     We discussed the surgical options including simple mastectomy and lumpectomy with  sentinel lymph node biopsy.  We discussed how mastectomy and lumpectomy with sentinel lymph node biopsy and radiation are equivocal for cancer surgeries.  We explained in depth why breast conservation therapy requires radiation treatments for the majority of women.    I explained that most women treated for invasive malignancy do receive systemic therapy, hormonal therapy or  chemotherapy postoperatively depending upon the final pathology, the lymph node status, and the hormone receptor status. I discussed that her surgical specimen will be sent for Oncotype testing, and this will determine if she requires

## 2024-03-28 ENCOUNTER — HOSPITAL ENCOUNTER (OUTPATIENT)
Dept: PREADMISSION TESTING | Age: 59
Discharge: HOME | End: 2024-04-01
Payer: COMMERCIAL

## 2024-03-28 VITALS — HEIGHT: 63 IN | BODY MASS INDEX: 32.76 KG/M2 | WEIGHT: 184.9 LBS

## 2024-03-28 LAB
ANION GAP SERPL CALCULATED.3IONS-SCNC: 15 MMOL/L (ref 7–19)
BUN SERPL-MCNC: 9 MG/DL (ref 6–20)
CALCIUM SERPL-MCNC: 9.2 MG/DL (ref 8.6–10)
CHLORIDE SERPL-SCNC: 100 MMOL/L (ref 98–111)
CO2 SERPL-SCNC: 26 MMOL/L (ref 22–29)
CREAT SERPL-MCNC: 0.6 MG/DL (ref 0.5–0.9)
EKG P AXIS: 56 DEGREES
EKG P-R INTERVAL: 202 MS
EKG Q-T INTERVAL: 430 MS
EKG QRS DURATION: 82 MS
EKG QTC CALCULATION (BAZETT): 435 MS
EKG T AXIS: 28 DEGREES
ERYTHROCYTE [DISTWIDTH] IN BLOOD BY AUTOMATED COUNT: 13.2 % (ref 11.5–14.5)
GLUCOSE SERPL-MCNC: 138 MG/DL (ref 74–109)
HCT VFR BLD AUTO: 42.2 % (ref 37–47)
HGB BLD-MCNC: 13.7 G/DL (ref 12–16)
MCH RBC QN AUTO: 26.9 PG (ref 27–31)
MCHC RBC AUTO-ENTMCNC: 32.5 G/DL (ref 33–37)
MCV RBC AUTO: 82.7 FL (ref 81–99)
PLATELET # BLD AUTO: 301 K/UL (ref 130–400)
PMV BLD AUTO: 9.3 FL (ref 9.4–12.3)
POTASSIUM SERPL-SCNC: 3.9 MMOL/L (ref 3.5–5)
RBC # BLD AUTO: 5.1 M/UL (ref 4.2–5.4)
SODIUM SERPL-SCNC: 141 MMOL/L (ref 136–145)
WBC # BLD AUTO: 8.6 K/UL (ref 4.8–10.8)

## 2024-03-28 PROCEDURE — 85027 COMPLETE CBC AUTOMATED: CPT

## 2024-03-28 PROCEDURE — 80048 BASIC METABOLIC PNL TOTAL CA: CPT

## 2024-03-28 PROCEDURE — 93010 ELECTROCARDIOGRAM REPORT: CPT | Performed by: INTERNAL MEDICINE

## 2024-03-28 PROCEDURE — 93005 ELECTROCARDIOGRAM TRACING: CPT | Performed by: ANESTHESIOLOGY

## 2024-03-28 NOTE — DISCHARGE INSTRUCTIONS
You will be contacted by someone from same-day surgery between 12-3 pm the day before your surgery regarding your arrival time. Please check your voicemail as they may leave a message with that information.  If you do not receive a call or voicemail or you have a problem please call [phone].    If you are running late the morning of your surgery or you wake up with signs/symptoms of COVID call [phone] for instructions    You will be scheduled to arrive 11/2-2 hours prior to your surgery time. Do not come earlier than when you are told to arrive.  You will come to the [facility] to register and be taken to the outpatient services area.    Do Not eat or drink anything after midnight. That includes candy, gum and mints, coffee, tea and water.    If appropriate, you may have a small sip of water with medications as instructed by your nurse/surgeon.  This is for your protection and to avoid food and liquid from getting into your lungs.     Take all your medicationss except for Jardiance the morning of surgery with a small sip of water. You may use your inhaler.    Brush your teeth and shower the morning of surgery.  Use hibiclens the night before surgery and the morning of surgery.  Chlorhexidine Gluconate 4% Solution    Patient should shower with this soap a the night before surgery and the morning of surgery washing from the neck down (avoiding contact with genitalia).      DO NOT WASH YOUR HAIR OR FACE WITH THIS SOAP.  When washing with this soap, apply enough to suds up the body thoroughly, turn the water away from your body and allow the soap suds to remain on the body for 2 full minutes, then rinse body completely.      After using this soap on the body, please do not apply powders or lotions to your body.  After the shower the night before surgery, please dry off with a new towel, sleep in new freshly laundered pj's, and change your bed linen before going to sleep.      IF YOU HAVE A PET IN

## 2024-04-01 ENCOUNTER — HOSPITAL ENCOUNTER (OUTPATIENT)
Age: 59
Setting detail: OUTPATIENT SURGERY
Discharge: HOME | End: 2024-04-01
Attending: SURGERY | Admitting: SURGERY
Payer: COMMERCIAL

## 2024-04-01 ENCOUNTER — HOSPITAL ENCOUNTER (OUTPATIENT)
Dept: ULTRASOUND IMAGING | Age: 59
Discharge: HOME | End: 2024-04-01
Payer: COMMERCIAL

## 2024-04-01 ENCOUNTER — HOSPITAL ENCOUNTER (OUTPATIENT)
Dept: NUCLEAR MEDICINE | Age: 59
Discharge: HOME | End: 2024-04-03
Attending: SURGERY
Payer: COMMERCIAL

## 2024-04-01 ENCOUNTER — ANESTHESIA (OUTPATIENT)
Dept: OPERATING ROOM | Age: 59
End: 2024-04-01
Payer: COMMERCIAL

## 2024-04-01 ENCOUNTER — ANESTHESIA EVENT (OUTPATIENT)
Dept: OPERATING ROOM | Age: 59
End: 2024-04-01
Payer: COMMERCIAL

## 2024-04-01 VITALS
HEIGHT: 63 IN | TEMPERATURE: 98.4 F | HEART RATE: 76 BPM | DIASTOLIC BLOOD PRESSURE: 80 MMHG | OXYGEN SATURATION: 94 % | WEIGHT: 185 LBS | RESPIRATION RATE: 18 BRPM | BODY MASS INDEX: 32.78 KG/M2 | SYSTOLIC BLOOD PRESSURE: 123 MMHG

## 2024-04-01 DIAGNOSIS — C50.911 MALIGNANT NEOPLASM OF RIGHT BREAST IN FEMALE, ESTROGEN RECEPTOR POSITIVE, UNSPECIFIED SITE OF BREAST (HCC): ICD-10-CM

## 2024-04-01 DIAGNOSIS — Z17.0 MALIGNANT NEOPLASM OF RIGHT BREAST IN FEMALE, ESTROGEN RECEPTOR POSITIVE, UNSPECIFIED SITE OF BREAST (HCC): ICD-10-CM

## 2024-04-01 DIAGNOSIS — C50.919 INFILTRATING DUCT CARCINOMA (HCC): ICD-10-CM

## 2024-04-01 LAB
GLUCOSE BLD-MCNC: 159 MG/DL (ref 70–99)
GLUCOSE BLD-MCNC: 176 MG/DL (ref 70–99)
Lab: NEGATIVE
Lab: NORMAL
PERFORMED ON: ABNORMAL
PERFORMED ON: ABNORMAL

## 2024-04-01 PROCEDURE — 38792 RA TRACER ID OF SENTINL NODE: CPT

## 2024-04-01 PROCEDURE — 3700000000 HC ANESTHESIA ATTENDED CARE: Performed by: SURGERY

## 2024-04-01 PROCEDURE — 2580000003 HC RX 258: Performed by: SURGERY

## 2024-04-01 PROCEDURE — 3600000004 HC SURGERY LEVEL 4 BASE: Performed by: SURGERY

## 2024-04-01 PROCEDURE — 76642 ULTRASOUND BREAST LIMITED: CPT | Performed by: SURGERY

## 2024-04-01 PROCEDURE — 88307 TISSUE EXAM BY PATHOLOGIST: CPT

## 2024-04-01 PROCEDURE — 6360000002 HC RX W HCPCS: Performed by: SURGERY

## 2024-04-01 PROCEDURE — 3700000001 HC ADD 15 MINUTES (ANESTHESIA): Performed by: SURGERY

## 2024-04-01 PROCEDURE — 2709999900 HC NON-CHARGEABLE SUPPLY: Performed by: SURGERY

## 2024-04-01 PROCEDURE — 82962 GLUCOSE BLOOD TEST: CPT

## 2024-04-01 PROCEDURE — 2500000003 HC RX 250 WO HCPCS: Performed by: NURSE ANESTHETIST, CERTIFIED REGISTERED

## 2024-04-01 PROCEDURE — 7100000000 HC PACU RECOVERY - FIRST 15 MIN: Performed by: SURGERY

## 2024-04-01 PROCEDURE — 19301 PARTIAL MASTECTOMY: CPT | Performed by: SURGERY

## 2024-04-01 PROCEDURE — 3600000014 HC SURGERY LEVEL 4 ADDTL 15MIN: Performed by: SURGERY

## 2024-04-01 PROCEDURE — 38900 IO MAP OF SENT LYMPH NODE: CPT | Performed by: SURGERY

## 2024-04-01 PROCEDURE — 7100000010 HC PHASE II RECOVERY - FIRST 15 MIN: Performed by: SURGERY

## 2024-04-01 PROCEDURE — 19285 PERQ DEV BREAST 1ST US IMAG: CPT | Performed by: SURGERY

## 2024-04-01 PROCEDURE — 6360000002 HC RX W HCPCS: Performed by: NURSE ANESTHETIST, CERTIFIED REGISTERED

## 2024-04-01 PROCEDURE — 7100000001 HC PACU RECOVERY - ADDTL 15 MIN: Performed by: SURGERY

## 2024-04-01 PROCEDURE — 76642 ULTRASOUND BREAST LIMITED: CPT

## 2024-04-01 PROCEDURE — 76942 ECHO GUIDE FOR BIOPSY: CPT

## 2024-04-01 PROCEDURE — 38525 BIOPSY/REMOVAL LYMPH NODES: CPT | Performed by: SURGERY

## 2024-04-01 PROCEDURE — 76942 ECHO GUIDE FOR BIOPSY: CPT | Performed by: SURGERY

## 2024-04-01 PROCEDURE — 6370000000 HC RX 637 (ALT 250 FOR IP): Performed by: SURGERY

## 2024-04-01 PROCEDURE — 7100000011 HC PHASE II RECOVERY - ADDTL 15 MIN: Performed by: SURGERY

## 2024-04-01 RX ORDER — IPRATROPIUM BROMIDE AND ALBUTEROL SULFATE 2.5; .5 MG/3ML; MG/3ML
1 SOLUTION RESPIRATORY (INHALATION)
Status: DISCONTINUED | OUTPATIENT
Start: 2024-04-01 | End: 2024-04-01 | Stop reason: HOSPADM

## 2024-04-01 RX ORDER — CELECOXIB 200 MG/1
200 CAPSULE ORAL ONCE
Status: COMPLETED | OUTPATIENT
Start: 2024-04-01 | End: 2024-04-01

## 2024-04-01 RX ORDER — HYDRALAZINE HYDROCHLORIDE 20 MG/ML
10 INJECTION INTRAMUSCULAR; INTRAVENOUS
Status: DISCONTINUED | OUTPATIENT
Start: 2024-04-01 | End: 2024-04-01 | Stop reason: HOSPADM

## 2024-04-01 RX ORDER — EPHEDRINE SULFATE/0.9% NACL/PF 25 MG/5 ML
SYRINGE (ML) INTRAVENOUS PRN
Status: DISCONTINUED | OUTPATIENT
Start: 2024-04-01 | End: 2024-04-01 | Stop reason: SDUPTHER

## 2024-04-01 RX ORDER — HYDROMORPHONE HYDROCHLORIDE 1 MG/ML
0.25 INJECTION, SOLUTION INTRAMUSCULAR; INTRAVENOUS; SUBCUTANEOUS EVERY 5 MIN PRN
Status: DISCONTINUED | OUTPATIENT
Start: 2024-04-01 | End: 2024-04-01 | Stop reason: HOSPADM

## 2024-04-01 RX ORDER — HYDROMORPHONE HYDROCHLORIDE 1 MG/ML
0.5 INJECTION, SOLUTION INTRAMUSCULAR; INTRAVENOUS; SUBCUTANEOUS EVERY 5 MIN PRN
Status: DISCONTINUED | OUTPATIENT
Start: 2024-04-01 | End: 2024-04-01 | Stop reason: HOSPADM

## 2024-04-01 RX ORDER — ISOSULFAN BLUE 50 MG/5ML
INJECTION, SOLUTION SUBCUTANEOUS PRN
Status: DISCONTINUED | OUTPATIENT
Start: 2024-04-01 | End: 2024-04-01 | Stop reason: ALTCHOICE

## 2024-04-01 RX ORDER — MIDAZOLAM HYDROCHLORIDE 1 MG/ML
INJECTION INTRAMUSCULAR; INTRAVENOUS PRN
Status: DISCONTINUED | OUTPATIENT
Start: 2024-04-01 | End: 2024-04-01 | Stop reason: SDUPTHER

## 2024-04-01 RX ORDER — FENTANYL CITRATE 50 UG/ML
INJECTION, SOLUTION INTRAMUSCULAR; INTRAVENOUS PRN
Status: DISCONTINUED | OUTPATIENT
Start: 2024-04-01 | End: 2024-04-01 | Stop reason: SDUPTHER

## 2024-04-01 RX ORDER — SODIUM CHLORIDE, SODIUM LACTATE, POTASSIUM CHLORIDE, CALCIUM CHLORIDE 600; 310; 30; 20 MG/100ML; MG/100ML; MG/100ML; MG/100ML
INJECTION, SOLUTION INTRAVENOUS CONTINUOUS
Status: DISCONTINUED | OUTPATIENT
Start: 2024-04-01 | End: 2024-04-01 | Stop reason: HOSPADM

## 2024-04-01 RX ORDER — DIPHENHYDRAMINE HYDROCHLORIDE 50 MG/ML
12.5 INJECTION INTRAMUSCULAR; INTRAVENOUS
Status: DISCONTINUED | OUTPATIENT
Start: 2024-04-01 | End: 2024-04-01 | Stop reason: HOSPADM

## 2024-04-01 RX ORDER — SODIUM CHLORIDE 9 MG/ML
INJECTION, SOLUTION INTRAVENOUS PRN
Status: DISCONTINUED | OUTPATIENT
Start: 2024-04-01 | End: 2024-04-01 | Stop reason: HOSPADM

## 2024-04-01 RX ORDER — ROPIVACAINE HYDROCHLORIDE 2 MG/ML
INJECTION, SOLUTION EPIDURAL; INFILTRATION; PERINEURAL PRN
Status: DISCONTINUED | OUTPATIENT
Start: 2024-04-01 | End: 2024-04-01 | Stop reason: ALTCHOICE

## 2024-04-01 RX ORDER — SODIUM CHLORIDE 0.9 % (FLUSH) 0.9 %
5-40 SYRINGE (ML) INJECTION EVERY 12 HOURS SCHEDULED
Status: DISCONTINUED | OUTPATIENT
Start: 2024-04-01 | End: 2024-04-01 | Stop reason: HOSPADM

## 2024-04-01 RX ORDER — PROPOFOL 10 MG/ML
INJECTION, EMULSION INTRAVENOUS PRN
Status: DISCONTINUED | OUTPATIENT
Start: 2024-04-01 | End: 2024-04-01 | Stop reason: SDUPTHER

## 2024-04-01 RX ORDER — DEXAMETHASONE SODIUM PHOSPHATE 4 MG/ML
INJECTION, SOLUTION INTRA-ARTICULAR; INTRALESIONAL; INTRAMUSCULAR; INTRAVENOUS; SOFT TISSUE PRN
Status: DISCONTINUED | OUTPATIENT
Start: 2024-04-01 | End: 2024-04-01 | Stop reason: ALTCHOICE

## 2024-04-01 RX ORDER — KETAMINE HYDROCHLORIDE 50 MG/ML
INJECTION, SOLUTION INTRAMUSCULAR; INTRAVENOUS PRN
Status: DISCONTINUED | OUTPATIENT
Start: 2024-04-01 | End: 2024-04-01 | Stop reason: SDUPTHER

## 2024-04-01 RX ORDER — LIDOCAINE HYDROCHLORIDE 10 MG/ML
INJECTION, SOLUTION EPIDURAL; INFILTRATION; INTRACAUDAL; PERINEURAL PRN
Status: DISCONTINUED | OUTPATIENT
Start: 2024-04-01 | End: 2024-04-01 | Stop reason: SDUPTHER

## 2024-04-01 RX ORDER — LABETALOL HYDROCHLORIDE 5 MG/ML
10 INJECTION, SOLUTION INTRAVENOUS
Status: DISCONTINUED | OUTPATIENT
Start: 2024-04-01 | End: 2024-04-01 | Stop reason: HOSPADM

## 2024-04-01 RX ORDER — OXYCODONE HYDROCHLORIDE AND ACETAMINOPHEN 5; 325 MG/1; MG/1
1 TABLET ORAL EVERY 6 HOURS PRN
Qty: 12 TABLET | Refills: 0 | Status: SHIPPED | OUTPATIENT
Start: 2024-04-01 | End: 2024-04-04

## 2024-04-01 RX ORDER — MEPERIDINE HYDROCHLORIDE 25 MG/ML
12.5 INJECTION INTRAMUSCULAR; INTRAVENOUS; SUBCUTANEOUS
Status: DISCONTINUED | OUTPATIENT
Start: 2024-04-01 | End: 2024-04-01 | Stop reason: HOSPADM

## 2024-04-01 RX ORDER — SODIUM CHLORIDE 0.9 % (FLUSH) 0.9 %
5-40 SYRINGE (ML) INJECTION PRN
Status: DISCONTINUED | OUTPATIENT
Start: 2024-04-01 | End: 2024-04-01 | Stop reason: HOSPADM

## 2024-04-01 RX ORDER — DEXAMETHASONE SODIUM PHOSPHATE 10 MG/ML
8 INJECTION, SOLUTION INTRAMUSCULAR; INTRAVENOUS ONCE
Status: COMPLETED | OUTPATIENT
Start: 2024-04-01 | End: 2024-04-01

## 2024-04-01 RX ORDER — ACETAMINOPHEN 500 MG
1000 TABLET ORAL ONCE
Status: COMPLETED | OUTPATIENT
Start: 2024-04-01 | End: 2024-04-01

## 2024-04-01 RX ORDER — ONDANSETRON 2 MG/ML
INJECTION INTRAMUSCULAR; INTRAVENOUS PRN
Status: DISCONTINUED | OUTPATIENT
Start: 2024-04-01 | End: 2024-04-01 | Stop reason: SDUPTHER

## 2024-04-01 RX ORDER — PROCHLORPERAZINE EDISYLATE 5 MG/ML
5 INJECTION INTRAMUSCULAR; INTRAVENOUS
Status: DISCONTINUED | OUTPATIENT
Start: 2024-04-01 | End: 2024-04-01 | Stop reason: HOSPADM

## 2024-04-01 RX ORDER — NALOXONE HYDROCHLORIDE 0.4 MG/ML
INJECTION, SOLUTION INTRAMUSCULAR; INTRAVENOUS; SUBCUTANEOUS PRN
Status: DISCONTINUED | OUTPATIENT
Start: 2024-04-01 | End: 2024-04-01 | Stop reason: HOSPADM

## 2024-04-01 RX ORDER — GLYCOPYRROLATE 0.2 MG/ML
INJECTION INTRAMUSCULAR; INTRAVENOUS PRN
Status: DISCONTINUED | OUTPATIENT
Start: 2024-04-01 | End: 2024-04-01 | Stop reason: SDUPTHER

## 2024-04-01 RX ORDER — ROCURONIUM BROMIDE 10 MG/ML
INJECTION, SOLUTION INTRAVENOUS PRN
Status: DISCONTINUED | OUTPATIENT
Start: 2024-04-01 | End: 2024-04-01 | Stop reason: SDUPTHER

## 2024-04-01 RX ADMIN — ACETAMINOPHEN 1000 MG: 500 TABLET ORAL at 11:31

## 2024-04-01 RX ADMIN — Medication 20 MG: at 13:50

## 2024-04-01 RX ADMIN — CEFAZOLIN 2000 MG: 2 INJECTION, POWDER, FOR SOLUTION INTRAMUSCULAR; INTRAVENOUS at 13:56

## 2024-04-01 RX ADMIN — SUGAMMADEX 500 MG: 100 INJECTION, SOLUTION INTRAVENOUS at 13:52

## 2024-04-01 RX ADMIN — DEXAMETHASONE SODIUM PHOSPHATE 10 MG: 10 INJECTION, SOLUTION INTRAMUSCULAR; INTRAVENOUS at 14:00

## 2024-04-01 RX ADMIN — SODIUM CHLORIDE, SODIUM LACTATE, POTASSIUM CHLORIDE, AND CALCIUM CHLORIDE: 600; 310; 30; 20 INJECTION, SOLUTION INTRAVENOUS at 11:17

## 2024-04-01 RX ADMIN — LIDOCAINE HYDROCHLORIDE 50 MG: 10 INJECTION, SOLUTION EPIDURAL; INFILTRATION; INTRACAUDAL; PERINEURAL at 13:48

## 2024-04-01 RX ADMIN — ONDANSETRON 4 MG: 2 INJECTION INTRAMUSCULAR; INTRAVENOUS at 15:34

## 2024-04-01 RX ADMIN — EPHEDRINE SULFATE 5 MG: 5 INJECTION INTRAVENOUS at 14:51

## 2024-04-01 RX ADMIN — Medication 10 MG: at 14:27

## 2024-04-01 RX ADMIN — Medication 10 MG: at 14:51

## 2024-04-01 RX ADMIN — FENTANYL CITRATE 50 MCG: 0.05 INJECTION, SOLUTION INTRAMUSCULAR; INTRAVENOUS at 14:35

## 2024-04-01 RX ADMIN — SODIUM CHLORIDE, SODIUM LACTATE, POTASSIUM CHLORIDE, AND CALCIUM CHLORIDE: 600; 310; 30; 20 INJECTION, SOLUTION INTRAVENOUS at 13:41

## 2024-04-01 RX ADMIN — ROCURONIUM BROMIDE 60 MG: 10 INJECTION, SOLUTION INTRAVENOUS at 13:48

## 2024-04-01 RX ADMIN — CELECOXIB 200 MG: 200 CAPSULE ORAL at 11:31

## 2024-04-01 RX ADMIN — GLYCOPYRROLATE 0.2 MG: 0.2 INJECTION, SOLUTION INTRAMUSCULAR; INTRAVENOUS at 14:04

## 2024-04-01 RX ADMIN — FENTANYL CITRATE 50 MCG: 0.05 INJECTION, SOLUTION INTRAMUSCULAR; INTRAVENOUS at 13:48

## 2024-04-01 RX ADMIN — PROPOFOL 180 MG: 10 INJECTION, EMULSION INTRAVENOUS at 13:48

## 2024-04-01 RX ADMIN — FENTANYL CITRATE 50 MCG: 0.05 INJECTION, SOLUTION INTRAMUSCULAR; INTRAVENOUS at 14:01

## 2024-04-01 RX ADMIN — FENTANYL CITRATE 50 MCG: 0.05 INJECTION, SOLUTION INTRAMUSCULAR; INTRAVENOUS at 15:08

## 2024-04-01 RX ADMIN — MIDAZOLAM 2 MG: 1 INJECTION INTRAMUSCULAR; INTRAVENOUS at 13:41

## 2024-04-01 ASSESSMENT — PAIN SCALES - GENERAL: PAINLEVEL_OUTOF10: 2

## 2024-04-01 ASSESSMENT — PAIN - FUNCTIONAL ASSESSMENT
PAIN_FUNCTIONAL_ASSESSMENT: FACE, LEGS, ACTIVITY, CRY, AND CONSOLABILITY (FLACC)
PAIN_FUNCTIONAL_ASSESSMENT: 0-10
PAIN_FUNCTIONAL_ASSESSMENT: 0-10
PAIN_FUNCTIONAL_ASSESSMENT: ACTIVITIES ARE NOT PREVENTED

## 2024-04-01 ASSESSMENT — LIFESTYLE VARIABLES: SMOKING_STATUS: 0

## 2024-04-01 ASSESSMENT — PAIN DESCRIPTION - DESCRIPTORS: DESCRIPTORS: ACHING;DISCOMFORT

## 2024-04-01 NOTE — DISCHARGE INSTR - ACTIVITY
No heavy lifting.  May shower tomorrow.   Do not soak in tub.   Do not drive while on pain medications.      Avoid constipation.   Take colace nightly (up to 300 mg) and miralax daily (up to three doses).   Drink 64 ounces of water and take a powdered fiber supplement daily (ie-Metamucil).      If you have signs of infection, call you doctor. These include:   -Increased pain, swelling, warmth, or redness  -Red streaks leading from the incision  -Pus draining from the incision  -A fever > 100.4

## 2024-04-01 NOTE — OP NOTE
was hemostatic. These excised nodes were sent to pathology.    The wound was closed in a layered fashion with 3-0 Vicryl in an interrupted  fashion.  The deep dermis was reapproximated with 3-0 Vicryl in a running  fashion.  The skin was reapproximated with 4-0 Monocryl in a running  subcuticular fashion.      Thre breast tissue was then examined and hemostasis remained. The lap pad was removed. Four hemo-clips were placed at the deep portion of the tumor bed.  The wound was then closed in a layered fashion.  The breast tissue was approximated in a radial fashion to provide a better oncoplastic closure.      The deep dermis was closed with 3-0 Vicryl in a running fashion.  The skin was closed with 4-0 Monocryl in a running subcuticular fashion.  A final dressing of Dermabond was placed over the incision sites.    The patient tolerated the procedure well.  There were no immediate complications.      All sponge and instrument counts were correct x 2.    Ms. Michelle Rodriguez was taken to the recovery room in stable condition.          _____________________________________  Velvet Cason DO      Electronically signed by Velvet Cason DO on 4/1/2024 at 3:49 PM

## 2024-04-01 NOTE — ANESTHESIA POSTPROCEDURE EVALUATION
Department of Anesthesiology  Postprocedure Note    Patient: Michelle Rodriguez  MRN: [id number]  YOB: 1965  Date of evaluation: 4/1/2024    Procedure Summary       Date: 04/01/24 Room / Location: [address]    Anesthesia Start: 1341 Anesthesia Stop: 1612    Procedure: RIGHT PARTIAL MASTECTOMY WITH INTRAOPERATIVE ULTRASOUND, PREOPERATIVE GUIDED NEEDLE LOCALIZATION, RIGHT NODE BIOPSY WITH INJECTION, MARGIN PROBE (Breast) Diagnosis:       Malignant neoplasm of right breast in female, estrogen receptor positive, unspecified site of breast (HCC)      (Malignant neoplasm of right breast in female, estrogen receptor positive, unspecified site of breast (HCC) [C50.911, Z17.0])    Surgeons: Velvet Cason DO Responsible Provider: Vamshi Puente APRN - CRNA    Anesthesia Type: general ASA Status: 3            Anesthesia Type: No value filed.    Josefa Phase I: Josefa Score: 10    Josefa Phase II:      Anesthesia Post Evaluation    Patient location during evaluation: PACU  Patient participation: complete - patient participated  Level of consciousness: sleepy but conscious  Pain score: 4  Airway patency: patent  Nausea & Vomiting: no nausea and no vomiting  Cardiovascular status: hemodynamically stable, hypertensive and tachycardic  Respiratory status: acceptable, spontaneous ventilation, nonlabored ventilation and face mask  Hydration status: stable  Comments: BP (!) 174/82   Pulse 89   Temp 97.4 °F (36.3 °C) (Skin)   Resp (!) 9   Ht 1.6 m (5' 3\")   Wt 83.9 kg (185 lb)   LMP 10/05/2021   SpO2 95%   BMI 32.77 kg/m²     Pain management: adequate    No notable events documented.

## 2024-04-01 NOTE — INTERVAL H&P NOTE
Update History & Physical    The patient's History and Physical of March 26, 2024 was reviewed with the patient and I examined the patient. There was no change. The surgical site was confirmed by the patient and me.     Plan: The risks, benefits, expected outcome, and alternative to the recommended procedure have been discussed with the patient. Patient understands and wants to proceed with the procedure.     Electronically signed by Velvet Cason DO on 4/1/2024 at 1:09 PM

## 2024-04-04 ENCOUNTER — TELEPHONE (OUTPATIENT)
Dept: SURGERY | Age: 59
End: 2024-04-04

## 2024-04-04 PROBLEM — C50.911 INFILTRATING DUCTAL CARCINOMA OF RIGHT BREAST (HCC): Status: ACTIVE | Noted: 2024-04-04

## 2024-04-12 ENCOUNTER — TELEPHONE (OUTPATIENT)
Dept: HEMATOLOGY | Age: 59
End: 2024-04-12

## 2024-04-15 ENCOUNTER — TELEPHONE (OUTPATIENT)
Dept: HEMATOLOGY | Age: 59
End: 2024-04-15

## 2024-04-15 NOTE — TELEPHONE ENCOUNTER
YEHUDA Stearns completed [facility] screening follow up call. Patient returned SW call from previous week. SW introduced self and explained social workers role and source of support.  Patient currently denies needs or concerns. SW encouraged the patient to call if assistance is needed in the future.  
<-------- Click here to INCLUDE CoVID-19 Discharge Instructions

## 2024-04-16 ENCOUNTER — OFFICE VISIT (OUTPATIENT)
Dept: SURGERY | Age: 59
End: 2024-04-16

## 2024-04-16 VITALS
BODY MASS INDEX: 32.78 KG/M2 | HEIGHT: 63 IN | TEMPERATURE: 97.4 F | HEART RATE: 73 BPM | OXYGEN SATURATION: 96 % | WEIGHT: 185 LBS

## 2024-04-16 DIAGNOSIS — C50.911 INFILTRATING DUCTAL CARCINOMA OF RIGHT BREAST (HCC): ICD-10-CM

## 2024-04-16 DIAGNOSIS — Z09 POSTOPERATIVE EXAMINATION: Primary | ICD-10-CM

## 2024-04-16 PROCEDURE — 99024 POSTOP FOLLOW-UP VISIT: CPT | Performed by: SURGERY

## 2024-04-19 ENCOUNTER — TELEPHONE (OUTPATIENT)
Dept: HEMATOLOGY | Age: 59
End: 2024-04-19

## 2024-04-19 NOTE — TELEPHONE ENCOUNTER
Called Patient and reminded patient of their appointment on 04/24/2024 and patient confirmed they would be here.

## 2024-04-22 NOTE — PROGRESS NOTES
MEDICAL ONCOLOGY PROGRESS NOTE    Pt Name: Michelle Rodriguez  MRN: [id number]  YOB: 1965  Date of evaluation: 4/24/2024    HISTORY OF PRESENT ILLNESS:    Diagnosis  Infiltrating ductal carcinoma, 9 o'clock right breast, March 2024  ER 83%, CO 64%, HER-2 1+/negative, Ki67 17%  Rob Negative for pathogenetic germline mutations  pT1c, (sn)pN0, pMx       Treatment Summary  4/1/24 Right, breast lumpectomy with 3 SLNB negative by Dr.Alice Cason at   Oncotype Recurrence Score Dx 16  4/24/24Initiated Letrozole 2.5 mg daily  Anticipate referral to radiation oncology      Cancer History  Michelle Rodriguez was first seen by me.  The patient has a strong family of colon and breast cancer.  The patient undergoes annual screening mammogram.  She had an abnormal screening mammogram.  Further workup consistent with invasive ductal carcinoma of the right breast.  She was referred by Dr. Cason.  2/22/24 Bilateral mammogram ([facility]): Stellate density identified in the upper outer quadrant of the right breast. This is located 10cm from the  nipple on the cc view. Mammographic appearance of the left breast is benign.  2/22/24 Right diagnostic mammogram ([facility]): Confirm the presence of a stellate mass with architectural distortion in the upper outer quadrant of the right breast.  2/22/24 US right breast ([facility]): Oval hypoechoic mass without acoustic shadowing at 9 o'clock in the right breast, 6cm from the nipple.  2/22/24 US right breast/axilla ([facility]): At the 9 o'clock position in the right breast, 6cm from the nipple is a 2.1 x 1.0 x 1.5cm oval hypoechoic mass. Internal blood flow is seen with color Doppler. No acoustic shadowing. This corresponds to the size and approximate location of the stellate mass seen on today's mammogram.  3/8/24 Breast, right breast needle core biopsies at 9 o'clock position: Infiltrating ductal carcinoma, no special type, favor grade 2.

## 2024-04-24 ENCOUNTER — HOSPITAL ENCOUNTER (OUTPATIENT)
Dept: INFUSION THERAPY | Age: 59
Discharge: HOME | End: 2024-04-24
Payer: COMMERCIAL

## 2024-04-24 ENCOUNTER — OFFICE VISIT (OUTPATIENT)
Dept: HEMATOLOGY | Age: 59
End: 2024-04-24
Payer: COMMERCIAL

## 2024-04-24 VITALS
TEMPERATURE: 98 F | DIASTOLIC BLOOD PRESSURE: 69 MMHG | WEIGHT: 185 LBS | HEART RATE: 71 BPM | SYSTOLIC BLOOD PRESSURE: 133 MMHG | BODY MASS INDEX: 32.78 KG/M2 | OXYGEN SATURATION: 95 % | HEIGHT: 63 IN

## 2024-04-24 DIAGNOSIS — Z79.811 ENCOUNTER FOR MONITORING AROMATASE INHIBITOR THERAPY: ICD-10-CM

## 2024-04-24 DIAGNOSIS — C50.911 INFILTRATING DUCTAL CARCINOMA OF RIGHT BREAST (HCC): Primary | ICD-10-CM

## 2024-04-24 DIAGNOSIS — Z51.81 ENCOUNTER FOR MONITORING AROMATASE INHIBITOR THERAPY: ICD-10-CM

## 2024-04-24 DIAGNOSIS — Z71.89 CARE PLAN DISCUSSED WITH PATIENT: ICD-10-CM

## 2024-04-24 DIAGNOSIS — Z91.89 AT RISK FOR LYMPHEDEMA: ICD-10-CM

## 2024-04-24 PROCEDURE — G2211 COMPLEX E/M VISIT ADD ON: HCPCS | Performed by: INTERNAL MEDICINE

## 2024-04-24 PROCEDURE — 99212 OFFICE O/P EST SF 10 MIN: CPT

## 2024-04-24 PROCEDURE — 99214 OFFICE O/P EST MOD 30 MIN: CPT | Performed by: INTERNAL MEDICINE

## 2024-04-24 RX ORDER — LETROZOLE 2.5 MG/1
2.5 TABLET, FILM COATED ORAL DAILY
Qty: 90 TABLET | Refills: 5 | Status: SHIPPED | OUTPATIENT
Start: 2024-04-24

## 2024-04-24 RX ORDER — GLIPIZIDE 5 MG/1
1 TABLET, FILM COATED, EXTENDED RELEASE ORAL DAILY
COMMUNITY

## 2024-04-25 ENCOUNTER — TELEPHONE (OUTPATIENT)
Dept: RADIATION ONCOLOGY | Facility: HOSPITAL | Age: 59
End: 2024-04-25
Payer: MEDICAID

## 2024-04-26 PROBLEM — C50.911 INFILTRATING DUCTAL CARCINOMA OF RIGHT BREAST: Status: ACTIVE | Noted: 2024-04-04

## 2024-05-07 ENCOUNTER — HOSPITAL ENCOUNTER (OUTPATIENT)
Dept: RADIATION ONCOLOGY | Facility: HOSPITAL | Age: 59
Setting detail: RADIATION/ONCOLOGY SERIES
End: 2024-05-07
Payer: COMMERCIAL

## 2024-05-08 ENCOUNTER — CONSULT (OUTPATIENT)
Age: 59
End: 2024-05-08
Payer: COMMERCIAL

## 2024-05-08 VITALS
BODY MASS INDEX: 32.6 KG/M2 | SYSTOLIC BLOOD PRESSURE: 146 MMHG | DIASTOLIC BLOOD PRESSURE: 83 MMHG | WEIGHT: 184 LBS | HEIGHT: 63 IN

## 2024-05-08 DIAGNOSIS — C50.911 INFILTRATING DUCTAL CARCINOMA OF RIGHT BREAST: Primary | ICD-10-CM

## 2024-05-08 DIAGNOSIS — Z98.890 S/P LYMPH NODE BIOPSY: ICD-10-CM

## 2024-05-08 DIAGNOSIS — Z98.890 S/P LUMPECTOMY, RIGHT BREAST: ICD-10-CM

## 2024-05-08 PROCEDURE — G0463 HOSPITAL OUTPT CLINIC VISIT: HCPCS | Performed by: RADIOLOGY

## 2024-05-08 RX ORDER — ESCITALOPRAM OXALATE 5 MG/1
1 TABLET ORAL DAILY
COMMUNITY

## 2024-05-08 RX ORDER — EZETIMIBE 10 MG/1
1 TABLET ORAL DAILY
COMMUNITY
End: 2024-05-08 | Stop reason: SDUPTHER

## 2024-05-08 RX ORDER — LETROZOLE 2.5 MG/1
1 TABLET, FILM COATED ORAL DAILY
COMMUNITY
Start: 2024-04-24

## 2024-05-09 ENCOUNTER — TELEPHONE (OUTPATIENT)
Dept: SURGERY | Age: 59
End: 2024-05-09

## 2024-05-09 ENCOUNTER — HOSPITAL ENCOUNTER (OUTPATIENT)
Dept: RADIATION ONCOLOGY | Facility: HOSPITAL | Age: 59
Setting detail: RADIATION/ONCOLOGY SERIES
Discharge: HOME | End: 2024-05-09
Payer: COMMERCIAL

## 2024-05-09 PROCEDURE — 77334 RADIATION TREATMENT AID(S): CPT | Performed by: RADIOLOGY

## 2024-05-09 PROCEDURE — 77290 THER RAD SIMULAJ FIELD CPLX: CPT | Performed by: RADIOLOGY

## 2024-05-09 NOTE — TELEPHONE ENCOUNTER
5/9/2024 Ruby with Dr Le office returned called and stated patient was seen yesterday for consult by Dr Meza.  She is also returning today for simulation scan and to begin radiation process.    Callback # [phone]  connor

## 2024-05-13 ENCOUNTER — HOSPITAL ENCOUNTER (OUTPATIENT)
Dept: RADIATION ONCOLOGY | Facility: HOSPITAL | Age: 59
Setting detail: RADIATION/ONCOLOGY SERIES
Discharge: HOME | End: 2024-05-13
Payer: COMMERCIAL

## 2024-05-13 PROCEDURE — 77300 RADIATION THERAPY DOSE PLAN: CPT | Performed by: RADIOLOGY

## 2024-05-13 PROCEDURE — 77338 DESIGN MLC DEVICE FOR IMRT: CPT | Performed by: RADIOLOGY

## 2024-05-13 PROCEDURE — 77301 RADIOTHERAPY DOSE PLAN IMRT: CPT | Performed by: RADIOLOGY

## 2024-05-14 ENCOUNTER — HOSPITAL ENCOUNTER (OUTPATIENT)
Dept: OCCUPATIONAL THERAPY | Age: 59
Setting detail: THERAPIES SERIES
Discharge: HOME | End: 2024-05-14
Payer: COMMERCIAL

## 2024-05-14 PROCEDURE — 93702 BIS XTRACELL FLUID ANALYSIS: CPT

## 2024-05-14 PROCEDURE — 97165 OT EVAL LOW COMPLEX 30 MIN: CPT

## 2024-05-14 NOTE — PROGRESS NOTES
Occupational Therapy: Initial Evaluation   Patient: Michelle Rodriguez (58 y.o. female)   Examination Date: 2024  Plan of Care Certification Period: 2024 to  2024      :  1965  MRN: [id number]  CSN: [id number]   Insurance: Payor: [facility] / Plan: [facility] / Product Type: *No Product type* /   Insurance ID: [id number] - ([facility]) Secondary Insurance (if applicable):    Insurance Information: [facility]   Referring Physician: Abigail Garcia MD Wederson Claudino, MD   PCP: Faisal Ely MD Visits to Date/Visits Approved:   /      No Show/Cancelled Appts:    /       Medical Diagnosis: Malignant neoplasm of unspecified site of right female breast [C50.911] C50. 911  No data recorded     PERTINENT MEDICAL HISTORY   Patient assessed for rehabilitation services?: Yes  Self reported health status:: Excellent    Medical History: Chart Reviewed: Yes   Past Medical History:   Diagnosis Date    Acne     Allergic rhinitis     Asthma     due to allergies    Breast cancer (HCC)     Diabetes mellitus (HCC)     GERD (gastroesophageal reflux disease)     Hypertension     Ovarian cancer (HCC)     Palpitations     Vasovagal syncope     Wears glasses      Surgical History:   Past Surgical History:   Procedure Laterality Date    ADENOIDECTOMY      CHOLECYSTECTOMY      COLONOSCOPY      COLONOSCOPY N/A 2018    Dr HuizarNzpbdc-[id number] x 2--5 yr recall    DILATION AND CURETTAGE OF UTERUS N/A 2021    EXAM UNDER ANESTHESIA, DILATATION AND CURETTAGE HYSTEROSCOPY, MYOSURE performed by Lana Smith MD at [facility] OR    HYSTERECTOMY (CERVIX STATUS UNKNOWN) N/A 10/05/2021    TOTAL LAPAROSCOPIC HYSTERECTOMY WITH DAVINCI, BSO, CYSTOSCOPY, EXAM UNDER ANESTHESIA, performed by Lana Smith MD at [facility] OR    MASTECTOMY N/A 2024    RIGHT PARTIAL MASTECTOMY WITH INTRAOPERATIVE ULTRASOUND, PREOPERATIVE GUIDED NEEDLE LOCALIZATION, RIGHT NODE BIOPSY WITH INJECTION, MARGIN PROBE performed by Velvet Cason

## 2024-05-15 ENCOUNTER — DOCUMENTATION (OUTPATIENT)
Dept: RADIATION ONCOLOGY | Facility: HOSPITAL | Age: 59
End: 2024-05-15
Payer: COMMERCIAL

## 2024-05-15 ENCOUNTER — HOSPITAL ENCOUNTER (OUTPATIENT)
Dept: RADIATION ONCOLOGY | Facility: HOSPITAL | Age: 59
Setting detail: RADIATION/ONCOLOGY SERIES
Discharge: HOME | End: 2024-05-15
Payer: COMMERCIAL

## 2024-05-15 PROCEDURE — 77385: CPT | Performed by: RADIOLOGY

## 2024-05-15 NOTE — PROGRESS NOTES
GER met with Mrs. Galindo who is here to start radiation treatment for infiltrating ductal carcinoma of right breast. GER introduced self and explained role and source of support. She is 58 years old and lives with her spouse and son. Mrs. Galindo has a strong support system which include her family and coworkers. She works full time at [employer] as an  and has been with the company for about two years and plans to continue to work throughout treatment. Currently, she does not have transportation concerns. Mrs. Galindo is worried about possible hospital bills and this writer informed her about the financial counselors and assistance through the hospital. She does take Lexapro and has been on this for about 1.5 years. She does not see counselor. Mrs. Galindo has positive coping strategies and denies needing assistance. GER encouraged her to call if assistance is needed in the future.

## 2024-05-16 ENCOUNTER — HOSPITAL ENCOUNTER (OUTPATIENT)
Dept: RADIATION ONCOLOGY | Facility: HOSPITAL | Age: 59
Discharge: HOME | End: 2024-05-16

## 2024-05-16 LAB
RAD ONC ARIA COURSE ID: NORMAL
RAD ONC ARIA COURSE LAST TREATMENT DATE: NORMAL
RAD ONC ARIA COURSE START DATE: NORMAL
RAD ONC ARIA COURSE TREATMENT ELAPSED DAYS: 1
RAD ONC ARIA FIRST TREATMENT DATE: NORMAL
RAD ONC ARIA PLAN FRACTIONS TREATED TO DATE: 2
RAD ONC ARIA PLAN ID: NORMAL
RAD ONC ARIA PLAN PRESCRIBED DOSE PER FRACTION: 4 GY
RAD ONC ARIA PLAN PRIMARY REFERENCE POINT: NORMAL
RAD ONC ARIA PLAN TOTAL FRACTIONS PRESCRIBED: 10
RAD ONC ARIA PLAN TOTAL PRESCRIBED DOSE: 4000 CGY
RAD ONC ARIA REFERENCE POINT DOSAGE GIVEN TO DATE: 8 GY
RAD ONC ARIA REFERENCE POINT ID: NORMAL
RAD ONC ARIA REFERENCE POINT SESSION DOSAGE GIVEN: 4 GY

## 2024-05-16 PROCEDURE — 77385: CPT | Performed by: RADIOLOGY

## 2024-05-17 ENCOUNTER — HOSPITAL ENCOUNTER (OUTPATIENT)
Dept: RADIATION ONCOLOGY | Facility: HOSPITAL | Age: 59
Setting detail: RADIATION/ONCOLOGY SERIES
Discharge: HOME | End: 2024-05-17
Payer: COMMERCIAL

## 2024-05-17 LAB
RAD ONC ARIA COURSE ID: NORMAL
RAD ONC ARIA COURSE LAST TREATMENT DATE: NORMAL
RAD ONC ARIA COURSE START DATE: NORMAL
RAD ONC ARIA COURSE TREATMENT ELAPSED DAYS: 2
RAD ONC ARIA FIRST TREATMENT DATE: NORMAL
RAD ONC ARIA PLAN FRACTIONS TREATED TO DATE: 3
RAD ONC ARIA PLAN ID: NORMAL
RAD ONC ARIA PLAN PRESCRIBED DOSE PER FRACTION: 4 GY
RAD ONC ARIA PLAN PRIMARY REFERENCE POINT: NORMAL
RAD ONC ARIA PLAN TOTAL FRACTIONS PRESCRIBED: 10
RAD ONC ARIA PLAN TOTAL PRESCRIBED DOSE: 4000 CGY
RAD ONC ARIA REFERENCE POINT DOSAGE GIVEN TO DATE: 12 GY
RAD ONC ARIA REFERENCE POINT ID: NORMAL
RAD ONC ARIA REFERENCE POINT SESSION DOSAGE GIVEN: 4 GY

## 2024-05-17 PROCEDURE — 77385: CPT | Performed by: RADIOLOGY

## 2024-05-17 PROCEDURE — 77336 RADIATION PHYSICS CONSULT: CPT | Performed by: RADIOLOGY

## 2024-05-20 ENCOUNTER — HOSPITAL ENCOUNTER (OUTPATIENT)
Dept: RADIATION ONCOLOGY | Facility: HOSPITAL | Age: 59
Setting detail: RADIATION/ONCOLOGY SERIES
Discharge: HOME | End: 2024-05-20
Payer: COMMERCIAL

## 2024-05-20 LAB
RAD ONC ARIA COURSE ID: NORMAL
RAD ONC ARIA COURSE LAST TREATMENT DATE: NORMAL
RAD ONC ARIA COURSE START DATE: NORMAL
RAD ONC ARIA COURSE TREATMENT ELAPSED DAYS: 5
RAD ONC ARIA FIRST TREATMENT DATE: NORMAL
RAD ONC ARIA PLAN FRACTIONS TREATED TO DATE: 4
RAD ONC ARIA PLAN ID: NORMAL
RAD ONC ARIA PLAN PRESCRIBED DOSE PER FRACTION: 4 GY
RAD ONC ARIA PLAN PRIMARY REFERENCE POINT: NORMAL
RAD ONC ARIA PLAN TOTAL FRACTIONS PRESCRIBED: 10
RAD ONC ARIA PLAN TOTAL PRESCRIBED DOSE: 4000 CGY
RAD ONC ARIA REFERENCE POINT DOSAGE GIVEN TO DATE: 16 GY
RAD ONC ARIA REFERENCE POINT ID: NORMAL
RAD ONC ARIA REFERENCE POINT SESSION DOSAGE GIVEN: 4 GY

## 2024-05-20 PROCEDURE — 77385: CPT | Performed by: RADIOLOGY

## 2024-05-21 ENCOUNTER — HOSPITAL ENCOUNTER (OUTPATIENT)
Dept: RADIATION ONCOLOGY | Facility: HOSPITAL | Age: 59
Setting detail: RADIATION/ONCOLOGY SERIES
Discharge: HOME | End: 2024-05-21
Payer: COMMERCIAL

## 2024-05-21 ENCOUNTER — APPOINTMENT (OUTPATIENT)
Dept: RADIATION ONCOLOGY | Facility: HOSPITAL | Age: 59
End: 2024-05-21
Payer: COMMERCIAL

## 2024-05-21 LAB
RAD ONC ARIA COURSE ID: NORMAL
RAD ONC ARIA COURSE LAST TREATMENT DATE: NORMAL
RAD ONC ARIA COURSE START DATE: NORMAL
RAD ONC ARIA COURSE TREATMENT ELAPSED DAYS: 6
RAD ONC ARIA FIRST TREATMENT DATE: NORMAL
RAD ONC ARIA PLAN FRACTIONS TREATED TO DATE: 5
RAD ONC ARIA PLAN ID: NORMAL
RAD ONC ARIA PLAN PRESCRIBED DOSE PER FRACTION: 4 GY
RAD ONC ARIA PLAN PRIMARY REFERENCE POINT: NORMAL
RAD ONC ARIA PLAN TOTAL FRACTIONS PRESCRIBED: 10
RAD ONC ARIA PLAN TOTAL PRESCRIBED DOSE: 4000 CGY
RAD ONC ARIA REFERENCE POINT DOSAGE GIVEN TO DATE: 20 GY
RAD ONC ARIA REFERENCE POINT ID: NORMAL
RAD ONC ARIA REFERENCE POINT SESSION DOSAGE GIVEN: 4 GY

## 2024-05-21 PROCEDURE — 77385: CPT | Performed by: RADIOLOGY

## 2024-05-22 ENCOUNTER — HOSPITAL ENCOUNTER (OUTPATIENT)
Dept: RADIATION ONCOLOGY | Facility: HOSPITAL | Age: 59
Setting detail: RADIATION/ONCOLOGY SERIES
Discharge: HOME | End: 2024-05-22
Payer: COMMERCIAL

## 2024-05-22 LAB
RAD ONC ARIA COURSE ID: NORMAL
RAD ONC ARIA COURSE LAST TREATMENT DATE: NORMAL
RAD ONC ARIA COURSE START DATE: NORMAL
RAD ONC ARIA COURSE TREATMENT ELAPSED DAYS: 7
RAD ONC ARIA FIRST TREATMENT DATE: NORMAL
RAD ONC ARIA PLAN FRACTIONS TREATED TO DATE: 6
RAD ONC ARIA PLAN ID: NORMAL
RAD ONC ARIA PLAN PRESCRIBED DOSE PER FRACTION: 4 GY
RAD ONC ARIA PLAN PRIMARY REFERENCE POINT: NORMAL
RAD ONC ARIA PLAN TOTAL FRACTIONS PRESCRIBED: 10
RAD ONC ARIA PLAN TOTAL PRESCRIBED DOSE: 4000 CGY
RAD ONC ARIA REFERENCE POINT DOSAGE GIVEN TO DATE: 24 GY
RAD ONC ARIA REFERENCE POINT ID: NORMAL
RAD ONC ARIA REFERENCE POINT SESSION DOSAGE GIVEN: 4 GY

## 2024-05-22 PROCEDURE — 77385: CPT | Performed by: RADIOLOGY

## 2024-05-23 ENCOUNTER — HOSPITAL ENCOUNTER (OUTPATIENT)
Dept: RADIATION ONCOLOGY | Facility: HOSPITAL | Age: 59
Setting detail: RADIATION/ONCOLOGY SERIES
Discharge: HOME | End: 2024-05-23
Payer: COMMERCIAL

## 2024-05-23 LAB
RAD ONC ARIA COURSE ID: NORMAL
RAD ONC ARIA COURSE LAST TREATMENT DATE: NORMAL
RAD ONC ARIA COURSE START DATE: NORMAL
RAD ONC ARIA COURSE TREATMENT ELAPSED DAYS: 8
RAD ONC ARIA FIRST TREATMENT DATE: NORMAL
RAD ONC ARIA PLAN FRACTIONS TREATED TO DATE: 7
RAD ONC ARIA PLAN ID: NORMAL
RAD ONC ARIA PLAN PRESCRIBED DOSE PER FRACTION: 4 GY
RAD ONC ARIA PLAN PRIMARY REFERENCE POINT: NORMAL
RAD ONC ARIA PLAN TOTAL FRACTIONS PRESCRIBED: 10
RAD ONC ARIA PLAN TOTAL PRESCRIBED DOSE: 4000 CGY
RAD ONC ARIA REFERENCE POINT DOSAGE GIVEN TO DATE: 28 GY
RAD ONC ARIA REFERENCE POINT ID: NORMAL
RAD ONC ARIA REFERENCE POINT SESSION DOSAGE GIVEN: 4 GY

## 2024-05-23 PROCEDURE — 77385: CPT | Performed by: RADIOLOGY

## 2024-05-24 ENCOUNTER — HOSPITAL ENCOUNTER (OUTPATIENT)
Dept: RADIATION ONCOLOGY | Facility: HOSPITAL | Age: 59
Setting detail: RADIATION/ONCOLOGY SERIES
Discharge: HOME | End: 2024-05-24
Payer: COMMERCIAL

## 2024-05-24 LAB
RAD ONC ARIA COURSE ID: NORMAL
RAD ONC ARIA COURSE LAST TREATMENT DATE: NORMAL
RAD ONC ARIA COURSE START DATE: NORMAL
RAD ONC ARIA COURSE TREATMENT ELAPSED DAYS: 9
RAD ONC ARIA FIRST TREATMENT DATE: NORMAL
RAD ONC ARIA PLAN FRACTIONS TREATED TO DATE: 8
RAD ONC ARIA PLAN ID: NORMAL
RAD ONC ARIA PLAN PRESCRIBED DOSE PER FRACTION: 4 GY
RAD ONC ARIA PLAN PRIMARY REFERENCE POINT: NORMAL
RAD ONC ARIA PLAN TOTAL FRACTIONS PRESCRIBED: 10
RAD ONC ARIA PLAN TOTAL PRESCRIBED DOSE: 4000 CGY
RAD ONC ARIA REFERENCE POINT DOSAGE GIVEN TO DATE: 32 GY
RAD ONC ARIA REFERENCE POINT ID: NORMAL
RAD ONC ARIA REFERENCE POINT SESSION DOSAGE GIVEN: 4 GY

## 2024-05-24 PROCEDURE — 77385: CPT | Performed by: RADIOLOGY

## 2024-05-24 PROCEDURE — 77336 RADIATION PHYSICS CONSULT: CPT | Performed by: RADIOLOGY

## 2024-05-27 ENCOUNTER — APPOINTMENT (OUTPATIENT)
Dept: RADIATION ONCOLOGY | Facility: HOSPITAL | Age: 59
End: 2024-05-27
Payer: COMMERCIAL

## 2024-05-28 ENCOUNTER — APPOINTMENT (OUTPATIENT)
Dept: RADIATION ONCOLOGY | Facility: HOSPITAL | Age: 59
End: 2024-05-28
Payer: COMMERCIAL

## 2024-05-28 ENCOUNTER — HOSPITAL ENCOUNTER (OUTPATIENT)
Dept: RADIATION ONCOLOGY | Facility: HOSPITAL | Age: 59
Setting detail: RADIATION/ONCOLOGY SERIES
Discharge: HOME | End: 2024-05-28
Payer: COMMERCIAL

## 2024-05-28 LAB
RAD ONC ARIA COURSE ID: NORMAL
RAD ONC ARIA COURSE LAST TREATMENT DATE: NORMAL
RAD ONC ARIA COURSE START DATE: NORMAL
RAD ONC ARIA COURSE TREATMENT ELAPSED DAYS: 13
RAD ONC ARIA FIRST TREATMENT DATE: NORMAL
RAD ONC ARIA PLAN FRACTIONS TREATED TO DATE: 9
RAD ONC ARIA PLAN ID: NORMAL
RAD ONC ARIA PLAN PRESCRIBED DOSE PER FRACTION: 4 GY
RAD ONC ARIA PLAN PRIMARY REFERENCE POINT: NORMAL
RAD ONC ARIA PLAN TOTAL FRACTIONS PRESCRIBED: 10
RAD ONC ARIA PLAN TOTAL PRESCRIBED DOSE: 4000 CGY
RAD ONC ARIA REFERENCE POINT DOSAGE GIVEN TO DATE: 36 GY
RAD ONC ARIA REFERENCE POINT ID: NORMAL
RAD ONC ARIA REFERENCE POINT SESSION DOSAGE GIVEN: 4 GY

## 2024-05-28 PROCEDURE — 77385: CPT | Performed by: RADIOLOGY

## 2024-05-29 ENCOUNTER — HOSPITAL ENCOUNTER (OUTPATIENT)
Dept: RADIATION ONCOLOGY | Facility: HOSPITAL | Age: 59
Setting detail: RADIATION/ONCOLOGY SERIES
Discharge: HOME | End: 2024-05-29
Payer: COMMERCIAL

## 2024-05-29 LAB
RAD ONC ARIA COURSE ID: NORMAL
RAD ONC ARIA COURSE LAST TREATMENT DATE: NORMAL
RAD ONC ARIA COURSE START DATE: NORMAL
RAD ONC ARIA COURSE TREATMENT ELAPSED DAYS: 14
RAD ONC ARIA FIRST TREATMENT DATE: NORMAL
RAD ONC ARIA PLAN FRACTIONS TREATED TO DATE: 10
RAD ONC ARIA PLAN ID: NORMAL
RAD ONC ARIA PLAN PRESCRIBED DOSE PER FRACTION: 4 GY
RAD ONC ARIA PLAN PRIMARY REFERENCE POINT: NORMAL
RAD ONC ARIA PLAN TOTAL FRACTIONS PRESCRIBED: 10
RAD ONC ARIA PLAN TOTAL PRESCRIBED DOSE: 4000 CGY
RAD ONC ARIA REFERENCE POINT DOSAGE GIVEN TO DATE: 40 GY
RAD ONC ARIA REFERENCE POINT ID: NORMAL
RAD ONC ARIA REFERENCE POINT SESSION DOSAGE GIVEN: 4 GY

## 2024-05-29 PROCEDURE — 77385: CPT | Performed by: RADIOLOGY

## 2024-06-11 ENCOUNTER — HOSPITAL ENCOUNTER (OUTPATIENT)
Age: 59
Setting detail: OUTPATIENT SURGERY
Discharge: HOME | End: 2024-06-11
Attending: INTERNAL MEDICINE | Admitting: INTERNAL MEDICINE
Payer: COMMERCIAL

## 2024-06-11 ENCOUNTER — ANESTHESIA EVENT (OUTPATIENT)
Dept: OPERATING ROOM | Age: 59
End: 2024-06-11

## 2024-06-11 ENCOUNTER — APPOINTMENT (OUTPATIENT)
Dept: OPERATING ROOM | Age: 59
End: 2024-06-11
Attending: INTERNAL MEDICINE

## 2024-06-11 ENCOUNTER — HOSPITAL ENCOUNTER (OUTPATIENT)
Age: 59
Setting detail: SPECIMEN
Discharge: HOME | End: 2024-06-11
Payer: COMMERCIAL

## 2024-06-11 ENCOUNTER — ANESTHESIA (OUTPATIENT)
Dept: OPERATING ROOM | Age: 59
End: 2024-06-11

## 2024-06-11 VITALS
SYSTOLIC BLOOD PRESSURE: 138 MMHG | RESPIRATION RATE: 18 BRPM | HEART RATE: 73 BPM | BODY MASS INDEX: 32.78 KG/M2 | DIASTOLIC BLOOD PRESSURE: 73 MMHG | HEIGHT: 63 IN | WEIGHT: 185 LBS | TEMPERATURE: 98.4 F | OXYGEN SATURATION: 92 %

## 2024-06-11 PROCEDURE — 45385 COLONOSCOPY W/LESION REMOVAL: CPT | Performed by: INTERNAL MEDICINE

## 2024-06-11 PROCEDURE — G8918 PT W/O PREOP ORDER IV AB PRO: HCPCS

## 2024-06-11 PROCEDURE — G8907 PT DOC NO EVENTS ON DISCHARG: HCPCS

## 2024-06-11 PROCEDURE — 88305 TISSUE EXAM BY PATHOLOGIST: CPT

## 2024-06-11 PROCEDURE — 45385 COLONOSCOPY W/LESION REMOVAL: CPT

## 2024-06-11 RX ORDER — SODIUM CHLORIDE 0.9 % (FLUSH) 0.9 %
5-40 SYRINGE (ML) INJECTION EVERY 12 HOURS SCHEDULED
Status: CANCELLED | OUTPATIENT
Start: 2024-06-11

## 2024-06-11 RX ORDER — NALOXONE HYDROCHLORIDE 0.4 MG/ML
INJECTION, SOLUTION INTRAMUSCULAR; INTRAVENOUS; SUBCUTANEOUS PRN
Status: CANCELLED | OUTPATIENT
Start: 2024-06-11

## 2024-06-11 RX ORDER — PROPOFOL 10 MG/ML
INJECTION, EMULSION INTRAVENOUS PRN
Status: DISCONTINUED | OUTPATIENT
Start: 2024-06-11 | End: 2024-06-11 | Stop reason: SDUPTHER

## 2024-06-11 RX ORDER — METOCLOPRAMIDE HYDROCHLORIDE 5 MG/ML
10 INJECTION INTRAMUSCULAR; INTRAVENOUS
Status: CANCELLED | OUTPATIENT
Start: 2024-06-11 | End: 2024-06-12

## 2024-06-11 RX ORDER — SODIUM CHLORIDE 9 MG/ML
INJECTION, SOLUTION INTRAVENOUS PRN
Status: CANCELLED | OUTPATIENT
Start: 2024-06-11

## 2024-06-11 RX ORDER — DEXMEDETOMIDINE HYDROCHLORIDE 100 UG/ML
INJECTION, SOLUTION INTRAVENOUS PRN
Status: DISCONTINUED | OUTPATIENT
Start: 2024-06-11 | End: 2024-06-11 | Stop reason: SDUPTHER

## 2024-06-11 RX ORDER — MEPERIDINE HYDROCHLORIDE 25 MG/ML
12.5 INJECTION INTRAMUSCULAR; INTRAVENOUS; SUBCUTANEOUS EVERY 5 MIN PRN
Status: CANCELLED | OUTPATIENT
Start: 2024-06-11

## 2024-06-11 RX ORDER — SODIUM CHLORIDE 0.9 % (FLUSH) 0.9 %
5-40 SYRINGE (ML) INJECTION PRN
Status: CANCELLED | OUTPATIENT
Start: 2024-06-11

## 2024-06-11 RX ORDER — DIPHENHYDRAMINE HYDROCHLORIDE 50 MG/ML
12.5 INJECTION INTRAMUSCULAR; INTRAVENOUS
Status: CANCELLED | OUTPATIENT
Start: 2024-06-11 | End: 2024-06-12

## 2024-06-11 RX ORDER — LIDOCAINE HYDROCHLORIDE 10 MG/ML
INJECTION, SOLUTION INFILTRATION; PERINEURAL PRN
Status: DISCONTINUED | OUTPATIENT
Start: 2024-06-11 | End: 2024-06-11 | Stop reason: SDUPTHER

## 2024-06-11 RX ORDER — SODIUM CHLORIDE, SODIUM LACTATE, POTASSIUM CHLORIDE, CALCIUM CHLORIDE 600; 310; 30; 20 MG/100ML; MG/100ML; MG/100ML; MG/100ML
INJECTION, SOLUTION INTRAVENOUS CONTINUOUS
Status: DISCONTINUED | OUTPATIENT
Start: 2024-06-11 | End: 2024-06-11 | Stop reason: HOSPADM

## 2024-06-11 RX ORDER — FLUTICASONE PROPIONATE 50 MCG
SPRAY, SUSPENSION (ML) NASAL
COMMUNITY
Start: 2024-04-22

## 2024-06-11 RX ADMIN — SODIUM CHLORIDE, SODIUM LACTATE, POTASSIUM CHLORIDE, CALCIUM CHLORIDE: 600; 310; 30; 20 INJECTION, SOLUTION INTRAVENOUS at 07:41

## 2024-06-11 RX ADMIN — PROPOFOL 50 MG: 10 INJECTION, EMULSION INTRAVENOUS at 08:21

## 2024-06-11 RX ADMIN — PROPOFOL 50 MG: 10 INJECTION, EMULSION INTRAVENOUS at 08:30

## 2024-06-11 RX ADMIN — DEXMEDETOMIDINE HYDROCHLORIDE 8 MCG: 100 INJECTION, SOLUTION INTRAVENOUS at 08:18

## 2024-06-11 RX ADMIN — PROPOFOL 50 MG: 10 INJECTION, EMULSION INTRAVENOUS at 08:26

## 2024-06-11 RX ADMIN — LIDOCAINE HYDROCHLORIDE 50 MG: 10 INJECTION, SOLUTION INFILTRATION; PERINEURAL at 08:20

## 2024-06-11 ASSESSMENT — PAIN - FUNCTIONAL ASSESSMENT: PAIN_FUNCTIONAL_ASSESSMENT: NONE - DENIES PAIN

## 2024-06-11 NOTE — OP NOTE
area was inspected, and a digital rectal exam was performed. A rectal exam was performed: normal tone, no palpable lesions. At this point, a forward viewing Olympus colonoscope was inserted into the anus and carefully advanced to the cecum.  The cecum was identified by the ileocecal valve and the appendiceal orifice. The colonoscope was then slowly withdrawn with careful inspection of the mucosa in a linear and circumferential fashion. The scope was retroflexed in the rectum. Suction was utilized during the procedure to remove as much air as possible from the bowel. The colonoscope was removed from the patient, and the procedure was terminated.  Findings are listed below.        Findings:     3 sessile benign-appearing polyps were noted in the colon and were removed by hot snare polypectomy as follows-  A 6 mm in diameter sessile ascending colon polyp, a 7 mm in diameter sessile distal transverse colon polyp and an 8 mm in diameter sessile proximal rectal polyp.    Extensive pan diverticulosis noted throughout the colon.  Grade 2 internal hemorrhoids without any bleeding stigmata at this time.    NO larger polyps or masses or strictures or colitis.  Suboptimal exam due to prep quality as described above.    Retroflexion in the rectum was otherwise normal and revealed no further abnormalities    Recommendations:  1. Repeat colonoscopy: pending pathology -based on the colonoscopy findings today, prep quality and a significant family history and personal history-in 1 year with a strict 2-day clear liquid diet and a 2-day prep using Plenvu or a similar solution assisted by Reglan 10 mg of Zofran 4 mg p.o. every 4 hours as needed; sooner if she were to develop lower GI symptoms such as bleeding, abdominal pain, change in bowel habits or stool caliber or if the patient has anemia or unexplained weight loss in the future.   2. Await biopsy results-you will receive a letter with your results within 7-10 days    - Resume

## 2024-06-11 NOTE — DISCHARGE INSTRUCTIONS
results.    6.  Discharge home when patient awake, vitals signs stable and tolerating liquids.    7. Call with questions or concerns [phone].

## 2024-06-11 NOTE — ANESTHESIA POSTPROCEDURE EVALUATION
Department of Anesthesiology  Postprocedure Note    Patient: Michelle Rodriguez  MRN: [id number]  YOB: 1965  Date of evaluation: 6/11/2024    Procedure Summary       Date: 06/11/24 Room / Location: [place] [address] / [facility]    Anesthesia Start: 0813 Anesthesia Stop:     Procedure: COLORECTAL CANCER SCREENING, NOT HIGH RISK (Abdomen) Diagnosis:       Screen for colon cancer      (Screen for colon cancer [Z12.11])    Surgeons: Uday Gomes MD Responsible Provider: Dieter Chery APRN - CRNA    Anesthesia Type: MAC ASA Status: 2            Anesthesia Type: No value filed.    Josefa Phase I:      Josefa Phase II:      Anesthesia Post Evaluation    Patient location during evaluation: bedside  Patient participation: complete - patient participated  Level of consciousness: awake  Pain score: 0  Airway patency: patent  Nausea & Vomiting: no nausea and no vomiting  Cardiovascular status: blood pressure returned to baseline  Respiratory status: acceptable  Hydration status: stable  Pain management: adequate        No notable events documented.

## 2024-06-11 NOTE — H&P
Patient Name: Michelle Rodriguez  : 1965  MRN: [id number]  DATE: 24    Allergies: No Known Allergies     ENDOSCOPY  History and Physical    Procedure:    [] Diagnostic Colonoscopy       [x] Screening Colonoscopy  [] EGD      [] ERCP      [] EUS       [] Other    [x] Previous office notes/History and Physical reviewed from the patients chart. Please see EMR for further details of HPI. I have examined the patient's status immediately prior to the procedure and:      Indications/HPI:    []Abdominal Pain   []Cancer- GI/Lung     [x]Fhx of colon CA/polyps  []History of Polyps  []Barretts            []Melena  []Abnormal Imaging              []Dysphagia              []Persistent Pneumonia   []Anemia                            []Food Impaction        [x]History of Polyps  [] GI Bleed             []Pulmonary nodule/Mass   []Change in bowel habits []Heartburn/Reflux  []Rectal Bleed (BRBPR)  []Chest Pain - Non Cardiac []Heme (+) Stool []Ulcers  []Constipation  []Hemoptysis  []Varices  []Diarrhea  []Hypoxemia    []Nausea/Vomiting   []Screening   []Crohns/Colitis  []Other:     Anesthesia:   [x] MAC [] Moderate Sedation   [] General   [] None     ROS: 12 pt Review of Symptoms was negative unless mentioned above    Medications:   Prior to Admission medications    Medication Sig Start Date End Date Taking? Authorizing Provider   fluticasone (FLONASE) 50 MCG/ACT nasal spray Spray 2 sprays every day by intranasal route. 24  Yes Jewell Rajan MD   glipiZIDE (GLUCOTROL XL) 5 MG extended release tablet Take 1 tablet by mouth daily  Patient not taking: Reported on 2024    Jewell Rajan MD   letrozole (FEMARA) 2.5 MG tablet Take 1 tablet by mouth daily 24   Abigail Garcia MD   ezetimibe (ZETIA) 10 MG tablet Take 1 tablet by mouth daily    Jewell Rajan MD   escitalopram (LEXAPRO) 5 MG tablet Take 1 tablet by mouth daily    Jewell Rajna MD   empagliflozin (JARDIANCE) 25 MG

## 2024-06-11 NOTE — ANESTHESIA PRE PROCEDURE
Department of Anesthesiology  Preprocedure Note       Name:  Michelle Rodriguez   Age:  58 y.o.  :  1965                                          MRN:  [id number]         Date:  2024      Surgeon: Surgeon(s):  Uday Gomes MD    Procedure: Procedure(s):  COLORECTAL CANCER SCREENING, NOT HIGH RISK    Medications prior to admission:   Prior to Admission medications    Medication Sig Start Date End Date Taking? Authorizing Provider   fluticasone (FLONASE) 50 MCG/ACT nasal spray Spray 2 sprays every day by intranasal route. 24  Yes Jewell Rajan MD   glipiZIDE (GLUCOTROL XL) 5 MG extended release tablet Take 1 tablet by mouth daily  Patient not taking: Reported on 2024    Jewell Rajan MD   letrozole (FEMARA) 2.5 MG tablet Take 1 tablet by mouth daily 24   Abigail Garcia MD   ezetimibe (ZETIA) 10 MG tablet Take 1 tablet by mouth daily    Jewell Rajan MD   escitalopram (LEXAPRO) 5 MG tablet Take 1 tablet by mouth daily    Jewell Rajan MD   empagliflozin (JARDIANCE) 25 MG tablet Take 1 tablet by mouth daily    ProviderJewell MD   loratadine (CLARITIN) 10 MG tablet Take 0.5 tablets by mouth daily    Jewell Rajan MD   omeprazole (PRILOSEC) 40 MG delayed release capsule Take 1 capsule by mouth daily    Jewell Rajan MD   atenolol (TENORMIN) 25 MG tablet Take 1 tablet by mouth daily 18   Kate Lane PA   albuterol sulfate (PROAIR RESPICLICK) 108 (90 Base) MCG/ACT aerosol powder inhalation Inhale 2 puffs into the lungs every 6 hours as needed for Wheezing or Shortness of Breath 18   Kate Lane PA       Current medications:    Current Facility-Administered Medications   Medication Dose Route Frequency Provider Last Rate Last Admin   • lactated ringers IV soln infusion   IntraVENous Continuous Uday Gomes  mL/hr at 24 0741 New Bag at 24 0741       Allergies:  No Known Allergies    Problem

## 2024-07-08 PROBLEM — Z98.890 S/P LYMPH NODE BIOPSY: Status: ACTIVE | Noted: 2024-07-08

## 2024-07-08 PROBLEM — Z92.3 HISTORY OF RADIATION THERAPY: Status: ACTIVE | Noted: 2024-07-08

## 2024-07-08 PROBLEM — Z98.890 S/P LUMPECTOMY, RIGHT BREAST: Status: ACTIVE | Noted: 2024-07-08

## 2024-07-08 PROBLEM — Z78.9 NON-SMOKER: Status: ACTIVE | Noted: 2024-07-08

## 2024-07-08 NOTE — PROGRESS NOTES
[facility]  Radiation Oncology Clinic   MD Abraham Marvin MD Casey RoberthODELL  _______________________________________________  [facility]  Department of Radiation Oncology  [address] [place] [id number]  Office: [phone]  Fax: [phone]    DATE: 07/11/2024  PATIENT: Herminia Galindo  1965                         MEDICAL RECORD #: [id number]    1. Infiltrating ductal carcinoma of right breast    2. S/P lumpectomy, right breast    3. S/P lymph node biopsy    4. History of radiation therapy    5. Non-smoker                                             REASON FOR VISIT:    Chief Complaint   Patient presents with    Breast Cancer     Herminia Galindo is a very pleasant 58 y.o. patient that has completed radiation therapy for carcinoma of the breast and returns to the clinic today for inital follow up exam. Reports fatigue, nasuea, heat intolerance, arthralgias, and color change to right breast. Denies appetite change, unexpected weight change, nausea/vomiting, diarrhea, light-headedness, weakness, and headaches.       HISTORY OF PRESENT ILLNESS  Diagnosed with Stage IA (T1c (sn)N0 M0) ER/RI+ infiltrating ductal carcinoma of the right breast. She completed 4000 cGy in 10 fractions to the right breast on 05/29/2024.     02/22/2024 - Bilateral Screening Mammogram:  Findings:   Stellate density identified in the upper outer quadrant of the right breast. This is located 10 cm from the nipple on the cc view.   A right diagnostic mammogram performed consisting of spot compression views. Spot compression views confirm the presence of a stellate mass with architectural distortion in the upper outer quadrant of the right breast.   A targeted right breast ultrasound demonstratesan oval hypoechoic mass without acoustic shadowing at 9:00 in the right breast, 6 cm from the nipple. On ultrasound this mass measures 2.1 x 1.0 cm  Mammographic appearance of the  left breast is benign  Recommend:   Percutaneous ultrasound-guided biopsy of the hypoechoic mass located laterally in the right breast, 9:00 by ultrasound.    03/08/2024 -  Breast, right breast needle core biopsies at 9 o'clock position:   Infiltrating ductal carcinoma, no special type, favor grade 2.   Infiltrating carcinoma measures 1.2 cm in greatest linear dimension and is present in multiple cores.   ER, [place] +    03/18/2024 - MRI Bilateral Breasts with and without contrast:  Biopsy-proven malignancy in the right breast measures up to 1.9 cm as described above.   No MRI evidence of malignancy in the left breast.   Small hiatal hernia with thickening and some irregularity of the distal esophagus. Consider further evaluation with endoscopy.   BI-RADS 6 - KNOWN MALIGNANCY     03/20/2024 - Appointment with :  Infiltrating ductal carcinoma, 9 o'clock right breast, Favor grade 2, ER 83%, [place] 64%, HER-2 1+/negative, Ki67 17%, March 2024  The patient was counseled today about diagnosis, staging, prognosis, diagnostic tests, medications, side effects and disease management.   Essentially, clinical T1 N0 M0, IDC right breast ER positive, W[place] positive, HER2 negative  Node-negative  I recommend to proceed with lumpectomy/sentinel lymph node biopsy  Anticipate Oncotype DX for further decision regarding adjuvant chemotherapy  Anticipate adjuvant endocrine therapy and refer to radiation oncology during next visit.  Genetic assessment-family history of breast cancer  Follow-up 3/12/24 [employer] genetic test results, in process  PLAN:  RTC with MD in 4 weeks  Follow-up 3/12/24 [employer] genetic test results  Continue follow-up with Dr Tasia Mays/[facility],3/26/24  Refer to /[facility] around May 2024 to consider repeat colonoscopy  Will request Oncotype Dx on specimen from surgery  Discussed care plan with Dr. Mays  Proceed with lumpectomy with Dr.Alice Mays/[facility]  Follow Up:   Return in 4 weeks  (on 4/17/2024) for CBC, Appointment with Dr. Trna.  Refer to /[facility] around May 2024    04/01/2024 - [facility] Comprehensive:  Negative for 81/81 genes    04/01/2024 -  Right breast lumpectomy and lymph node biopsy per :  Breast, right lumpectomy:   Infiltrating ductal carcinoma, no special type, grade 2.   Infiltrating carcinoma measures 1.7 cm in greatest dimension.   Infiltrating carcinoma is located 0.4 cm from cranial and 0.2 cm caudal margins.   Intermediate grade ductal carcinoma in situ is identified in conjunction with the invasive lesion.   In situ carcinoma is located greater than 1.0 cm from all surgical excision margins.   Focal changes consistent with fibroadenomatoid mastopathy involving nonneoplastic breast parenchyma.   Changes consistent with fibrocystic mastopathy identified involving nonneoplastic breast parenchyma.   Sections of skin, negative for evidence of malignancy.   Breast, excision of additional right breast deep margin:   Benign breast parenchyma with changes consistent with fibrocystic mastopathy and dense hyalinized fibrosis.   Breast, excision of additional right breast cranial margin:   Benign breast parenchyma with dense fibrosis.   Breast, excision of additional right breast caudal margin:   Benign breast parenchyma with changes consistent with fibrocystic mastopathy.   Lymph node, right sentinel lymph node biopsy:   3 lymph nodes, negative for evidence of malignancy.   AJCC STAGE:  pT1c, (sn)pN0, pMx     04/16/2024 - Appointment with :  Doing well postoperatively  Plan  Continue any current medications  Wound care discussed  Pt is to increase activities as tolerated  Usual diet  Follow up: three months with me. F/u with radiation oncology. F/u with oncology.     04/24/2024 - Appointment with Dr. Tran:  Infiltrating ductal carcinoma, 9 o'clock right breast, Favor grade 2, ER 83%, [place] 64%, HER-2 1+/negative, Ki67 17%, March  2024  Essentially, clinical T1 N0 M0, IDC right breast ER positive, CA positive, HER2 negative.   Node-negative  S/p lumpectomy/sentinel lymph node biopsy  [id number], stage I  Oncotype DX = 16  I do not recommend adjuvant chemotherapy based on NCCN guidelines.  Recommended adjuvant endocrine therapy with letrozole x 5 years  Recommended consultation with radiation oncology for consideration of adjuvant RT  Plan:  Adjuvant endocrine therapy with letrozole  Referral to radiation oncology   PLAN:  RTC with MD in 4 months  Referral to Dr. Adame for radiation consideration   Referral to Lymphedema Clinic for therapy  Recommend adjuvant endocrine therapy letrozole 2.5 mg daily-script sent   Continue follow-up with Dr Tasia Mays/[facility],7/16/24  Refer to /[facility] around May 2024 to consider repeat colonoscopy  Discussed results of Oncotype Dx  Discussed side effects of letrozole and management  Follow Up:   Return in about 4 months (around 8/24/2024) for Appointment with Dr. Tran.  Referral to Dr. Adame  Referral to Lymphedema Clinic    04/24/2024 - Hormonal Therapy course:  Letrozole    05/08/2024 - Consult with :  Given the patient's age, tumor size and histology, she appears to be a candidate for accelerated partial breast irradiation (APBI).  I anticipate a dose of  4000 cGy in 10 fractions over 2 weeks .  After CT simulation, should we find that the patient is not a candidate for APBI, we will plan to treat with a moderate hypofractionated schedule of a total of 5256 cGy in 20 fractions over 4 weeks.  We will simulate treatment fields tomorrow with plans to begin the treatment planning, final dose to be determined.  We discussed post surgery and radiation risk for lymphedema, referral has already been placed to lymphedema clinic for initial evaluation/education, she will continue ongoing management per primary care physician and other specialists.      05/15/2024 - 05/29/2024 -  Completed radiation course:  Received 4000 cGy in 10 fractions to the right breast.       History obtained from  PATIENT and CHART    PAST MEDICAL HISTORY  Past Medical History:   Diagnosis Date    Depression     Diabetes mellitus     GERD (gastroesophageal reflux disease)     Hyperlipidemia       PAST SURGICAL HISTORY  Past Surgical History:   Procedure Laterality Date    CHOLECYSTECTOMY      COLONOSCOPY  04/23/2018    HYSTERECTOMY      TONSILLECTOMY AND ADENOIDECTOMY        FAMILY HISTORY  family history is not on file.    SOCIAL HISTORY  Social History     Tobacco Use    Smoking status: Never    Smokeless tobacco: Never   Substance Use Topics    Alcohol use: Yes     Comment: occ    Drug use: Not Currently      ALLERGIES  Patient has no known allergies.     MEDICATIONS  Current Outpatient Medications   Medication Sig Dispense Refill    atenolol (TENORMIN) 25 MG tablet Take 1 tablet by mouth Daily.      empagliflozin (Jardiance) 25 MG tablet tablet Take  by mouth Daily.      escitalopram (LEXAPRO) 5 MG tablet Take 1 tablet by mouth Daily.      ezetimibe (ZETIA) 10 MG tablet Take 1 tablet by mouth Daily.      letrozole (FEMARA) 2.5 MG tablet Take 1 tablet by mouth Daily.      loratadine (CLARITIN) 10 MG tablet Take 1 tablet by mouth Daily.      omeprazole (priLOSEC) 40 MG capsule Take 1 capsule by mouth Daily.       No current facility-administered medications for this visit.      The following portions of the patient's history were reviewed and updated as appropriate: allergies, current medications, past family history, past medical history, past social history, past surgical history and problem list.    REVIEW OF SYSTEMS  Review of Systems   Constitutional:  Positive for fatigue.   HENT: Negative.     Eyes:         Glasses   Respiratory: Negative.     Cardiovascular: Negative.    Gastrointestinal:  Positive for nausea (occasionally).   Endocrine: Positive for heat intolerance (r/t to letrozole).   Genitourinary:  "Negative.    Musculoskeletal:  Positive for arthralgias (r/t letrozole).   Skin:  Positive for color change (\"brown spots\" to right breast).   Neurological: Negative.    Hematological: Negative.      I have reviewed and confirmed the accuracy of the ROS as documented by the MA/LPN/RN ODELL Fabian    PHYSICAL EXAM  VITAL SIGNS:   Vitals:    07/11/24 0903   BP: 146/81   Pulse: 85   SpO2: 95%  Comment: room air   Weight: 81.3 kg (179 lb 3.2 oz)   Height: 160 cm (63\")   PainSc: 0-No pain     Physical Exam  Vitals reviewed.   Constitutional:       Appearance: Normal appearance.   HENT:      Head: Normocephalic.      Nose: Nose normal.   Eyes:      Pupils: Pupils are equal, round, and reactive to light.   Cardiovascular:      Rate and Rhythm: Normal rate and regular rhythm.      Pulses: Normal pulses.      Heart sounds: Normal heart sounds.   Pulmonary:      Effort: Pulmonary effort is normal. No respiratory distress.      Breath sounds: Normal breath sounds. No wheezing.   Chest:      Comments: Right breast s/p lumpectomy and radiation therapy. Well-healed. No palpable masses noted.   Abdominal:      General: Bowel sounds are normal.      Palpations: There is no mass.   Musculoskeletal:         General: Normal range of motion.      Cervical back: Normal range of motion and neck supple. No tenderness.   Lymphadenopathy:      Cervical: No cervical adenopathy.      Upper Body:      Right upper body: No supraclavicular, axillary or pectoral adenopathy.      Left upper body: No supraclavicular, axillary or pectoral adenopathy.   Skin:     General: Skin is warm and dry.      Capillary Refill: Capillary refill takes less than 2 seconds.   Neurological:      General: No focal deficit present.      Mental Status: She is alert and oriented to person, place, and time.      Motor: No weakness.   Psychiatric:         Mood and Affect: Mood normal.         Behavior: Behavior normal.         Performance Status: ECOG (0) Fully " active, able to carry on all predisease performance without restriction    Clinical Quality Measures  -Pain Documented by Standardized Tool, FPS Herminia Galindo reports a pain score of 0. Given her pain assessment as noted, treatment options were discussed and the following options were decided upon as a follow-up plan to address the patient's pain:  no pain reported, no plan given .  Pain Medications               escitalopram (LEXAPRO) 5 MG tablet Take 1 tablet by mouth Daily.          Body Mass Index Screening and Follow-Up Plan  BMI is >= 30 and <35. (Class 1 Obesity). The following options were offered after discussion;: referral to primary care    Tobacco Use: Screening and Cessation Intervention  Social History    Tobacco Use      Smoking status: Never      Smokeless tobacco: Never    Advanced Care Planning Advance Care Planning   ACP discussion was held with the patient during this visit. Patient does not have an advance directive, information provided.    Depression screening - PHQ-9 Total Score: 0    ASSESSMENT AND PLAN  1. Infiltrating ductal carcinoma of right breast    2. S/P lumpectomy, right breast    3. S/P lymph node biopsy    4. History of radiation therapy    5. Non-smoker      No orders of the defined types were placed in this encounter.    Recommendations:  Herminia Galindo is status post completion of adjuvant radiation therapy to the right breast and presents to our clinic today for follow up exam. Diagnosed with Stage IA (T1c (sn)N0 M0) ER/[place]+ infiltrating ductal carcinoma of the right breast. She completed 4000 cGy in 10 fractions to the right breast on 05/29/2024.     We discussed expected post radiation long and short term effects, monthly self exams and NCCN surveillance recommendations. She is doing well and has no evidence of recurrent or metastatic disease today. Will defer mammogram imagine to general surgery. Continue ongoing management with other physicians, will follow up with us in 6  months or sooner if needed.    Patient Instructions   1) mammograms per Dr. Mays  2) return in 6 months    Return in about 6 months (around 1/11/2025).     Time Spent: I spent 40 minutes caring for Herminia on this date of service. This time includes time spent by me in the following activities: preparing for the visit, reviewing tests, obtaining and/or reviewing a separately obtained history, performing a medically appropriate examination and/or evaluation, counseling and educating the patient/family/caregiver, ordering medications, tests, or procedures, referring and communicating with other health care professionals, documenting information in the medical record, independently interpreting results and communicating that information with the patient/family/caregiver, and care coordination.     Sandip Lepe, APRN  07/11/2024

## 2024-07-10 ENCOUNTER — HOSPITAL ENCOUNTER (OUTPATIENT)
Dept: RADIATION ONCOLOGY | Facility: HOSPITAL | Age: 59
Setting detail: RADIATION/ONCOLOGY SERIES
End: 2024-07-10
Payer: COMMERCIAL

## 2024-07-11 ENCOUNTER — OFFICE VISIT (OUTPATIENT)
Age: 59
End: 2024-07-11
Payer: COMMERCIAL

## 2024-07-11 VITALS
DIASTOLIC BLOOD PRESSURE: 81 MMHG | SYSTOLIC BLOOD PRESSURE: 146 MMHG | WEIGHT: 179.2 LBS | OXYGEN SATURATION: 95 % | HEIGHT: 63 IN | HEART RATE: 85 BPM | BODY MASS INDEX: 31.75 KG/M2

## 2024-07-11 DIAGNOSIS — Z98.890 S/P LYMPH NODE BIOPSY: ICD-10-CM

## 2024-07-11 DIAGNOSIS — Z92.3 HISTORY OF RADIATION THERAPY: ICD-10-CM

## 2024-07-11 DIAGNOSIS — Z78.9 NON-SMOKER: ICD-10-CM

## 2024-07-11 DIAGNOSIS — C50.911 INFILTRATING DUCTAL CARCINOMA OF RIGHT BREAST: Primary | ICD-10-CM

## 2024-07-11 DIAGNOSIS — Z98.890 S/P LUMPECTOMY, RIGHT BREAST: ICD-10-CM

## 2024-07-11 PROCEDURE — G0463 HOSPITAL OUTPT CLINIC VISIT: HCPCS | Performed by: RADIOLOGY

## 2024-07-16 ENCOUNTER — OFFICE VISIT (OUTPATIENT)
Dept: SURGERY | Age: 59
End: 2024-07-16
Payer: COMMERCIAL

## 2024-07-16 VITALS
HEART RATE: 70 BPM | HEIGHT: 66 IN | OXYGEN SATURATION: 96 % | BODY MASS INDEX: 28.98 KG/M2 | TEMPERATURE: 97 F | WEIGHT: 180.3 LBS

## 2024-07-16 DIAGNOSIS — C50.911 INFILTRATING DUCTAL CARCINOMA OF RIGHT BREAST (HCC): Primary | ICD-10-CM

## 2024-07-16 PROCEDURE — 99213 OFFICE O/P EST LOW 20 MIN: CPT | Performed by: SURGERY

## 2024-07-16 NOTE — PROGRESS NOTES
SUBJECTIVE:  Ms. Rodriguez is a 58 y.o. female who presents today in follow up from her right breast partial mastectomy and sln bx from April of 2024 for a grade 2, 1.7 cm, infiltrating ductal carcinoma (T1N0M0). She is on letrozole. She has completed XRT. Negative genetic testing. She has occasional soreness but it is mild.    Patient's medications, allergies, past medical, surgical, social and family histories werereviewed and updated as appropriate.    Review of Systems   Constitutional:  Positive for fatigue.   Psychiatric/Behavioral:  The patient is nervous/anxious.    All other systems reviewed and are negative.      OBJECTIVE:  Pulse 70   Temp 97 °F (36.1 °C) (Temporal)   Ht 1.676 m (5' 6\")   Wt 81.8 kg (180 lb 4.8 oz)   LMP 10/05/2021   SpO2 96%   BMI 29.10 kg/m²   Physical Exam  Vitals reviewed. Exam conducted with a chaperone present.   Constitutional:       General: She is not in acute distress.     Appearance: She is well-developed. She is not ill-appearing.   HENT:      Head: Normocephalic and atraumatic.   Eyes:      Pupils: Pupils are equal, round, and reactive to light.   Cardiovascular:      Rate and Rhythm: Normal rate and regular rhythm.   Pulmonary:      Effort: Pulmonary effort is normal.      Breath sounds: Normal breath sounds. No wheezing or rales.   Chest:   Breasts:     Right: Normal. No inverted nipple, mass, nipple discharge, skin change or tenderness.      Left: Normal. No inverted nipple, mass, nipple discharge, skin change or tenderness.      Comments: No clear palpable masses.  Abdominal:      General: Bowel sounds are normal. There is no distension.      Palpations: Abdomen is soft.   Musculoskeletal:         General: Normal range of motion.      Cervical back: Normal range of motion and neck supple.   Lymphadenopathy:      Cervical: No cervical adenopathy.      Upper Body:      Right upper body: No supraclavicular or axillary adenopathy.      Left upper body: No supraclavicular or

## 2024-08-07 DIAGNOSIS — C50.911 INFILTRATING DUCTAL CARCINOMA OF RIGHT BREAST (HCC): Primary | ICD-10-CM

## 2024-08-20 NOTE — PROGRESS NOTES
MEDICAL ONCOLOGY PROGRESS NOTE    Pt Name: Michelle Rodriguez  MRN: [id number]  YOB: 1965  Date of evaluation: 8/21/2024    HISTORY OF PRESENT ILLNESS:    Diagnosis  Infiltrating ductal carcinoma, 9 o'clock right breast, March 2024  ER 83%, [place] 64%, HER-2 1+/negative, Ki67 17%  Rob Negative for pathogenetic germline mutations  pT1c, (sn)pN0, pMx       Treatment Summary  4/1/24 Right, breast lumpectomy with 3 SLNB negative by Dr.Alice Cason at   Oncotype Recurrence Score Dx 16  4/24/24 Initiated Letrozole 2.5 mg daily  5/15/24-5/29/24- Completed radiation therapy 4000 cGy to right breast by  at [facility]      Cancer History  Michelle Rodriguez was first seen by me.  The patient has a strong family of colon and breast cancer.  The patient undergoes annual screening mammogram.  She had an abnormal screening mammogram.  Further workup consistent with invasive ductal carcinoma of the right breast.  She was referred by Dr. Cason.  2/22/24 Bilateral mammogram ([facility]): Stellate density identified in the upper outer quadrant of the right breast. This is located 10cm from the  nipple on the cc view. Mammographic appearance of the left breast is benign.  2/22/24 Right diagnostic mammogram ([facility]): Confirm the presence of a stellate mass with architectural distortion in the upper outer quadrant of the right breast.  2/22/24 US right breast ([facility]): Oval hypoechoic mass without acoustic shadowing at 9 o'clock in the right breast, 6cm from the nipple.  2/22/24 US right breast/axilla ([facility]): At the 9 o'clock position in the right breast, 6cm from the nipple is a 2.1 x 1.0 x 1.5cm oval hypoechoic mass. Internal blood flow is seen with color Doppler. No acoustic shadowing. This corresponds to the size and approximate location of the stellate mass seen on today's mammogram.  3/8/24 Breast, right breast needle core biopsies at 9 o'clock position: Infiltrating ductal

## 2024-08-21 ENCOUNTER — HOSPITAL ENCOUNTER (OUTPATIENT)
Dept: INFUSION THERAPY | Age: 59
Discharge: HOME | End: 2024-08-21
Payer: COMMERCIAL

## 2024-08-21 ENCOUNTER — OFFICE VISIT (OUTPATIENT)
Dept: HEMATOLOGY | Age: 59
End: 2024-08-21
Payer: COMMERCIAL

## 2024-08-21 VITALS
HEIGHT: 63 IN | OXYGEN SATURATION: 96 % | WEIGHT: 179.1 LBS | DIASTOLIC BLOOD PRESSURE: 70 MMHG | TEMPERATURE: 97.9 F | BODY MASS INDEX: 31.73 KG/M2 | SYSTOLIC BLOOD PRESSURE: 130 MMHG | HEART RATE: 81 BPM

## 2024-08-21 DIAGNOSIS — C50.911 INFILTRATING DUCTAL CARCINOMA OF RIGHT BREAST (HCC): Primary | ICD-10-CM

## 2024-08-21 DIAGNOSIS — Z51.81 ENCOUNTER FOR MONITORING AROMATASE INHIBITOR THERAPY: ICD-10-CM

## 2024-08-21 DIAGNOSIS — Z71.89 CARE PLAN DISCUSSED WITH PATIENT: ICD-10-CM

## 2024-08-21 DIAGNOSIS — Z85.3 ENCOUNTER FOR FOLLOW-UP SURVEILLANCE OF BREAST CANCER: ICD-10-CM

## 2024-08-21 DIAGNOSIS — Z08 ENCOUNTER FOR FOLLOW-UP SURVEILLANCE OF BREAST CANCER: ICD-10-CM

## 2024-08-21 DIAGNOSIS — Z79.811 ENCOUNTER FOR MONITORING AROMATASE INHIBITOR THERAPY: ICD-10-CM

## 2024-08-21 PROCEDURE — 99213 OFFICE O/P EST LOW 20 MIN: CPT | Performed by: INTERNAL MEDICINE

## 2024-08-21 PROCEDURE — 99212 OFFICE O/P EST SF 10 MIN: CPT

## 2024-09-25 ENCOUNTER — TELEPHONE (OUTPATIENT)
Dept: SURGERY | Age: 59
End: 2024-09-25

## 2024-10-18 ENCOUNTER — HOSPITAL ENCOUNTER (OUTPATIENT)
Dept: WOMENS IMAGING | Age: 59
Discharge: HOME | End: 2024-10-18
Attending: SURGERY
Payer: COMMERCIAL

## 2024-10-18 VITALS — BODY MASS INDEX: 31.18 KG/M2 | WEIGHT: 176 LBS

## 2024-10-18 DIAGNOSIS — C50.911 INFILTRATING DUCTAL CARCINOMA OF RIGHT BREAST (HCC): ICD-10-CM

## 2024-10-18 PROCEDURE — 77065 DX MAMMO INCL CAD UNI: CPT

## 2024-10-22 ENCOUNTER — OFFICE VISIT (OUTPATIENT)
Dept: SURGERY | Age: 59
End: 2024-10-22
Payer: COMMERCIAL

## 2024-10-22 VITALS
TEMPERATURE: 98 F | HEIGHT: 63 IN | WEIGHT: 183 LBS | BODY MASS INDEX: 32.43 KG/M2 | OXYGEN SATURATION: 98 % | HEART RATE: 68 BPM

## 2024-10-22 DIAGNOSIS — C50.911 INFILTRATING DUCTAL CARCINOMA OF RIGHT BREAST (HCC): Primary | ICD-10-CM

## 2024-10-22 PROCEDURE — 99213 OFFICE O/P EST LOW 20 MIN: CPT | Performed by: SURGERY

## 2024-10-22 RX ORDER — METRONIDAZOLE 500 MG/1
500 TABLET ORAL 4 TIMES DAILY
COMMUNITY
Start: 2024-10-18

## 2024-10-22 NOTE — PROGRESS NOTES
SUBJECTIVE:  Ms. Rodriguez is a 59 y.o. female who presents today in follow up from her right breast partial mastectomy and sln bx from April of 2024 for a grade 2, 1.7 cm, infiltrating ductal carcinoma (T1N0M0). She is on letrozole. She has completed XRT. Negative genetic testing. She has occasional soreness but it is mild.    Patient's medications, allergies, past medical, surgical, social and family histories werereviewed and updated as appropriate.    Review of Systems   Constitutional:  Positive for fatigue.   Psychiatric/Behavioral:  The patient is nervous/anxious.    All other systems reviewed and are negative.      OBJECTIVE:  Pulse 68   Temp 98 °F (36.7 °C) (Temporal)   Ht 1.6 m (5' 3\")   Wt 83 kg (183 lb)   LMP 10/05/2021   SpO2 98%   BMI 32.42 kg/m²   Physical Exam  Vitals reviewed. Exam conducted with a chaperone present.   Constitutional:       General: She is not in acute distress.     Appearance: She is well-developed. She is not ill-appearing.   HENT:      Head: Normocephalic and atraumatic.   Eyes:      Pupils: Pupils are equal, round, and reactive to light.   Cardiovascular:      Rate and Rhythm: Normal rate and regular rhythm.   Pulmonary:      Effort: Pulmonary effort is normal.      Breath sounds: Normal breath sounds. No wheezing or rales.   Chest:   Breasts:     Right: Normal. No inverted nipple, mass, nipple discharge, skin change or tenderness.      Left: Normal. No inverted nipple, mass, nipple discharge, skin change or tenderness.      Comments: No clear palpable masses.  Abdominal:      General: Bowel sounds are normal. There is no distension.      Palpations: Abdomen is soft.   Musculoskeletal:         General: Normal range of motion.      Cervical back: Normal range of motion and neck supple.   Lymphadenopathy:      Cervical: No cervical adenopathy.      Upper Body:      Right upper body: No supraclavicular or axillary adenopathy.      Left upper body: No supraclavicular or axillary

## 2025-01-09 NOTE — PROGRESS NOTES
[facility]  Radiation Oncology Clinic   Tal Hsu MD, FACR  Sandipgeorgi Lepe ODELL  _______________________________________________  [facility]  Department of Radiation Oncology  [address] [place] [id number]  Office: [phone]  Fax: [phone]    DATE: 01/13/2025  PATIENT: Herminia Galindo  1965                         MEDICAL RECORD #: [id number]    1. Infiltrating ductal carcinoma of right breast    2. S/P lumpectomy, right breast    3. S/P lymph node biopsy    4. Prophylactic use of letrozole    5. History of radiation therapy    6. Non-smoker                                             REASON FOR VISIT:    Chief Complaint   Patient presents with    Breast Cancer     Herminia Galindo is a very pleasant 59 y.o. patient that has completed radiation therapy for carcinoma of the breast and returns to the clinic today for routine follow up exam. Reports fatigue and generalized arthralgias. Denies appetite change, activity change, unexpected weight change, visual disturbance, nausea/vomiting, diarrhea, light-headedness, weakness, and headaches. She continues to follow  and .     HISTORY OF PRESENT ILLNESS  Diagnosed with Stage IA (T1c (sn)N0 M0), ER positive, AR positive, HER2 negative, grade 2, infiltrating ductal carcinoma the right breast. She completed 4000 cGy in 10 fractions to the right breast on 05/29/2024.     02/22/2024 - Bilateral Screening Mammogram:  Findings:   Stellate density identified in the upper outer quadrant of the right breast. This is located 10 cm from the nipple on the cc view.   A right diagnostic mammogram performed consisting of spot compression views. Spot compression views confirm the presence of a stellate mass with architectural distortion in the upper outer quadrant of the right breast.   A targeted right breast ultrasound demonstratesan oval hypoechoic mass without acoustic shadowing at 9:00  in the right breast, 6 cm from the nipple. On ultrasound this mass measures 2.1 x 1.0 cm  Mammographic appearance of the left breast is benign  Recommend:   Percutaneous ultrasound-guided biopsy of the hypoechoic mass located laterally in the right breast, 9:00 by ultrasound.    03/08/2024 -  Breast, right breast needle core biopsies at 9 o'clock position:   Infiltrating ductal carcinoma, no special type, favor grade 2.   Infiltrating carcinoma measures 1.2 cm in greatest linear dimension and is present in multiple cores.   ER, HI +    03/18/2024 - MRI Bilateral Breasts with and without contrast:  Biopsy-proven malignancy in the right breast measures up to 1.9 cm as described above.   No MRI evidence of malignancy in the left breast.   Small hiatal hernia with thickening and some irregularity of the distal esophagus. Consider further evaluation with endoscopy.   BI-RADS 6 - KNOWN MALIGNANCY     03/20/2024 - Appointment with :  Infiltrating ductal carcinoma, 9 o'clock right breast, Favor grade 2, ER 83%, HI 64%, HER-2 1+/negative, Ki67 17%, March 2024  The patient was counseled today about diagnosis, staging, prognosis, diagnostic tests, medications, side effects and disease management.   Essentially, clinical T1 N0 M0, IDC right breast ER positive, HI positive, HER2 negative  Node-negative  I recommend to proceed with lumpectomy/sentinel lymph node biopsy  Anticipate Oncotype DX for further decision regarding adjuvant chemotherapy  Anticipate adjuvant endocrine therapy and refer to radiation oncology during next visit.  Genetic assessment-family history of breast cancer  Follow-up 3/12/24 [employer] genetic test results, in process  PLAN:  RTC with MD in 4 weeks  Follow-up 3/12/24 [employer] genetic test results  Continue follow-up with Dr Tasia Mays/[facility],3/26/24  Refer to /[facility] around May 2024 to consider repeat colonoscopy  Will request Oncotype Dx on specimen from  surgery  Discussed care plan with Dr. Mays  Proceed with lumpectomy with Dr.Alice Mays/[facility]  Follow Up:   Return in 4 weeks (on 4/17/2024) for CBC, Appointment with Dr. Tran.  Refer to /[facility] around May 2024    04/01/2024 - [facility]:  Negative for 81/81 genes    04/01/2024 -  Right breast lumpectomy and lymph node biopsy per :  Breast, right lumpectomy:   Infiltrating ductal carcinoma, no special type, grade 2.   Infiltrating carcinoma measures 1.7 cm in greatest dimension.   Infiltrating carcinoma is located 0.4 cm from cranial and 0.2 cm caudal margins.   Intermediate grade ductal carcinoma in situ is identified in conjunction with the invasive lesion.   In situ carcinoma is located greater than 1.0 cm from all surgical excision margins.   Focal changes consistent with fibroadenomatoid mastopathy involving nonneoplastic breast parenchyma.   Changes consistent with fibrocystic mastopathy identified involving nonneoplastic breast parenchyma.   Sections of skin, negative for evidence of malignancy.   Breast, excision of additional right breast deep margin:   Benign breast parenchyma with changes consistent with fibrocystic mastopathy and dense hyalinized fibrosis.   Breast, excision of additional right breast cranial margin:   Benign breast parenchyma with dense fibrosis.   Breast, excision of additional right breast caudal margin:   Benign breast parenchyma with changes consistent with fibrocystic mastopathy.   Lymph node, right sentinel lymph node biopsy:   3 lymph nodes, negative for evidence of malignancy.   AJCC STAGE:  pT1c, (sn)pN0, pMx     04/16/2024 - Appointment with :  Assessment  Doing well postoperatively  Plan  Continue any current medications  Wound care discussed  Pt is to increase activities as tolerated  Usual diet  Follow up: three months with me. F/u with radiation oncology. F/u with oncology.     04/24/2024 - Appointment with   Marc:  Infiltrating ductal carcinoma, 9 o'clock right breast, Favor grade 2, ER 83%, ME 64%, HER-2 1+/negative, Ki67 17%, March 2024  Essentially, clinical T1 N0 M0, IDC right breast ER positive, ME positive, HER2 negative.   Node-negative  S/p lumpectomy/sentinel lymph node biopsy  [id number], stage I  Oncotype DX = 16  I do not recommend adjuvant chemotherapy based on NCCN guidelines.  Recommended adjuvant endocrine therapy with letrozole x 5 years  Recommended consultation with radiation oncology for consideration of adjuvant RT  Plan:  Adjuvant endocrine therapy with letrozole  Referral to radiation oncology   PLAN:  RTC with MD in 4 months  Referral to Dr. Adame for radiation consideration   Referral to Lymphedema Clinic for therapy  Recommend adjuvant endocrine therapy letrozole 2.5 mg daily-script sent   Continue follow-up with Dr Tasia Mays/[facility],7/16/24  Refer to /[facility] around May 2024 to consider repeat colonoscopy  Discussed results of Oncotype Dx  Discussed side effects of letrozole and management  Follow Up:   Return in about 4 months (around 8/24/2024) for Appointment with Dr. Tran.  Referral to Dr. Adame  Referral to Lymphedema Clinic    04/24/2024 - Hormonal Therapy course:  Letrozole    05/08/2024 - Consult with :  Given the patient's age, tumor size and histology, she appears to be a candidate for accelerated partial breast irradiation (APBI).  I anticipate a dose of  4000 cGy in 10 fractions over 2 weeks .  After CT simulation, should we find that the patient is not a candidate for APBI, we will plan to treat with a moderate hypofractionated schedule of a total of 5256 cGy in 20 fractions over 4 weeks.  We will simulate treatment fields tomorrow with plans to begin the treatment planning, final dose to be determined.  We discussed post surgery and radiation risk for lymphedema, referral has already been placed to lymphedema clinic for initial  evaluation/education, she will continue ongoing management per primary care physician and other specialists.      05/15/2024 - 05/29/2024 - Completed radiation course:  Received 4000 cGy in 10 fractions to the right breast.     07/11/2024 - Appointment with ODELL Ayers - radiation therapy:  Mammograms per Dr. Mays  Return in 6 months    08/21/2024 - Appointment with :  Infiltrating ductal carcinoma, 9 o'clock right breast, Favor grade 2, ER 83%, RI 64%, HER-2 1+/negative, Ki67 17%, March 2024  Essentially, clinical T1 N0 M0, IDC right breast ER positive, RI positive, HER2 negative.   Node-negative  S/p lumpectomy/sentinel lymph node biopsy  [id number], stage I  Oncotype DX = 16  I do not recommend adjuvant chemotherapy based on NCCN guidelines.  Recommended adjuvant endocrine therapy with letrozole x 5 years  Plan:  Adjuvant endocrine therapy with letrozole  Referral to radiation oncology   PLAN:  RTC with Rolanda in 6 months   Continue adjuvant endocrine therapy letrozole 2.5 mg daily   Continue follow-up with Dr Tasia Mays/[facility]  Continue follow-up with ODELL Jimenez/[facility], 1/13/25  Continue follow-up with Lymphedema Clinic for therapy  Follow Up:   Return in about 6 months (around 2/21/2025) for NO LABS, Appointment with ODELL Antoine.  Data Unavailable    10/22/2024 - Appointment with :  PLAN:  She will be due for bilateral diagnostic mammogram in February of 2025. Will see her after this for repeat exam.  Continue letrozole. Continue oncology follow-up.   Encouraged to call with any questions.  Return in about 4 months (around 2/22/2025).    10/18/2024 - Right breast mammogram:  Postoperative change of the right breast with no residual mass noted.   RECOMMENDATION:   Annual screening mammogram   BIRADS category  2: Benign findings     History obtained from  PATIENT and CHART    PAST MEDICAL HISTORY  Past Medical History:   Diagnosis Date     Depression     Diabetes mellitus     GERD (gastroesophageal reflux disease)     Hyperlipidemia       PAST SURGICAL HISTORY  Past Surgical History:   Procedure Laterality Date    CHOLECYSTECTOMY      COLONOSCOPY  04/23/2018    HYSTERECTOMY      TONSILLECTOMY AND ADENOIDECTOMY        FAMILY HISTORY  family history is not on file.    SOCIAL HISTORY  Social History     Tobacco Use    Smoking status: Never    Smokeless tobacco: Never   Substance Use Topics    Alcohol use: Yes     Comment: occ    Drug use: Not Currently      ALLERGIES  Patient has no known allergies.     MEDICATIONS  Current Outpatient Medications   Medication Sig Dispense Refill    atenolol (TENORMIN) 25 MG tablet Take 1 tablet by mouth Daily.      empagliflozin (Jardiance) 25 MG tablet tablet Take  by mouth Daily.      escitalopram (LEXAPRO) 5 MG tablet Take 1 tablet by mouth Daily.      ezetimibe (ZETIA) 10 MG tablet Take 1 tablet by mouth Daily.      letrozole (FEMARA) 2.5 MG tablet Take 1 tablet by mouth Daily.      loratadine (CLARITIN) 10 MG tablet Take 1 tablet by mouth Daily.      omeprazole (priLOSEC) 40 MG capsule Take 1 capsule by mouth Daily.       No current facility-administered medications for this visit.      The following portions of the patient's history were reviewed and updated as appropriate: allergies, current medications, past family history, past medical history, past social history, past surgical history and problem list.    REVIEW OF SYSTEMS  Review of Systems   Constitutional:  Positive for fatigue. Negative for activity change, appetite change and unexpected weight change.   HENT: Negative.     Eyes: Negative.  Negative for visual disturbance.        Glasses   Respiratory: Negative.     Cardiovascular: Negative.    Gastrointestinal:  Negative for nausea and vomiting.   Endocrine: Negative.    Genitourinary: Negative.    Musculoskeletal:  Positive for arthralgias (generalized).   Allergic/Immunologic: Negative.    Neurological:  "        Patient describes \"brain fog\" can't remember things. Present x last 3-4 months   Hematological: Negative.    Psychiatric/Behavioral: Negative.     I have reviewed and confirmed the accuracy of the ROS as documented by the MA/SARAHI/RN ODELL Fabian    PHYSICAL EXAM  VITAL SIGNS:   Vitals:    01/13/25 0915   BP: 152/78   Weight: 81.2 kg (179 lb)   Height: 160 cm (63\")   PainSc: 0-No pain     Physical Exam  Vitals reviewed.   Constitutional:       Appearance: Normal appearance.   HENT:      Head: Normocephalic.      Nose: Nose normal.   Eyes:      Pupils: Pupils are equal, round, and reactive to light.   Cardiovascular:      Rate and Rhythm: Normal rate and regular rhythm.      Pulses: Normal pulses.      Heart sounds: Normal heart sounds.   Pulmonary:      Effort: Pulmonary effort is normal. No respiratory distress.      Breath sounds: Normal breath sounds. No wheezing.   Chest:   Breasts:     Right: Tenderness present. No mass or skin change.      Left: Normal.      Comments: Right breast s/p lumpectomy and radiation therapy. Well-healed. No palpable masses noted.   Abdominal:      General: Bowel sounds are normal.      Palpations: There is no mass.   Musculoskeletal:         General: Normal range of motion.      Cervical back: Normal range of motion and neck supple. No tenderness.   Lymphadenopathy:      Cervical: No cervical adenopathy.      Upper Body:      Right upper body: No supraclavicular, axillary or pectoral adenopathy.      Left upper body: No supraclavicular, axillary or pectoral adenopathy.   Skin:     General: Skin is warm and dry.      Capillary Refill: Capillary refill takes less than 2 seconds.   Neurological:      General: No focal deficit present.      Mental Status: She is alert and oriented to person, place, and time.      Motor: No weakness.   Psychiatric:         Mood and Affect: Mood normal.         Behavior: Behavior normal.         Performance Status: ECOG (0) Fully active, able " to carry on all predisease performance without restriction    Clinical Quality Measures  - Pain Documented by Standardized Tool, FPS Herminia Galindo reports a pain score of 0. Given her pain assessment as noted, treatment options were discussed and the following options were decided upon as a follow-up plan to address the patient's pain:  No pain, no plan given .  Pain Medications               escitalopram (LEXAPRO) 5 MG tablet Take 1 tablet by mouth Daily.          - Body Mass Index Screening and Follow-Up Plan Body mass index is 31.71 kg/m².     - Tobacco Use: Screening and Cessation Intervention  Social History    Tobacco Use      Smoking status: Never      Smokeless tobacco: Never    - Advanced Care Planning Advance Care Planning   ACP discussion was held with the patient during this visit. Patient does not have an advance directive, information provided.    - PHQ-2 Depression Screening:  Little interest or pleasure in doing things? Not at all   Feeling down, depressed, or hopeless? Not at all   PHQ-2 Total Score 0     ASSESSMENT AND PLAN  1. Infiltrating ductal carcinoma of right breast    2. S/P lumpectomy, right breast    3. S/P lymph node biopsy    4. Prophylactic use of letrozole    5. History of radiation therapy    6. Non-smoker      Orders Placed This Encounter   Procedures    Mammo Diagnostic Digital Tomosynthesis Bilateral With CAD     Recommendations:  Herminia Galindo is status post completion of adjuvant radiation therapy to the right breast and presents to our clinic today for follow up exam. Diagnosed with Stage IA (T1c (sn)N0 M0 G2), ER/KS+ infiltrating ductal carcinoma the right breast. She completed 4000 cGy in 10 fractions to the right breast on 05/29/2024.     We discussed expected post radiation long and short term effects, monthly self exams and NCCN surveillance recommendations. She is doing well and has no evidence of recurrent or metastatic disease today. She is due for her bilateral  mammogram in February, I will send the order to [facility] per her request. She continues on letrozole therapy per medical oncology    Continue ongoing management with other physicians, will follow up with us in 6 months or sooner if needed.    Patient Instructions   1) Due for bilateral mammogram in February - [facility]  2) return in 6 months     Return in about 6 months (around 7/13/2025).     Time Spent: I spent 44 minutes caring for Herminia on this date of service. This time includes time spent by me in the following activities: preparing for the visit, reviewing tests, obtaining and/or reviewing a separately obtained history, performing a medically appropriate examination and/or evaluation, counseling and educating the patient/family/caregiver, ordering medications, tests, or procedures, and documenting information in the medical record.   Sandip Lepe, APRN  01/13/2025

## 2025-01-10 ENCOUNTER — HOSPITAL ENCOUNTER (OUTPATIENT)
Dept: RADIATION ONCOLOGY | Facility: HOSPITAL | Age: 60
Setting detail: RADIATION/ONCOLOGY SERIES
End: 2025-01-10
Payer: COMMERCIAL

## 2025-01-10 NOTE — ANESTHESIA POSTPROCEDURE EVALUATION
Department of Anesthesiology  Postprocedure Note    Patient: Lilly Barlow  MRN: [id number]  YOB: 1965  Date of evaluation: 8/20/2021  Time:  12:15 PM     Procedure Summary     Date: 08/20/21 Room / Location: [address]    Anesthesia Start: 1137 Anesthesia Stop: 1215    Procedure: EXAM UNDER ANESTHESIA, DILATATION AND CURETTAGE HYSTEROSCOPY, MYOSURE (N/A ) Diagnosis: (PMB, ENDOMETRIAL THICKENING)    Surgeons: Campos Guerrier MD Responsible Provider: JESSICA Tomas CRNA    Anesthesia Type: general ASA Status: 2          Anesthesia Type: general    Josefa Phase I: Josefa Score: 10    Josefa Phase II:      Last vitals: Reviewed and per EMR flowsheets.        Anesthesia Post Evaluation    Patient location during evaluation: PACU  Patient participation: complete - patient participated  Level of consciousness: sleepy but conscious  Pain score: 0  Airway patency: patent  Nausea & Vomiting: no nausea and no vomiting  Complications: no  Cardiovascular status: hemodynamically stable  Respiratory status: acceptable, spontaneous ventilation and room air  Hydration status: euvolemic Female

## 2025-01-13 ENCOUNTER — OFFICE VISIT (OUTPATIENT)
Age: 60
End: 2025-01-13
Payer: COMMERCIAL

## 2025-01-13 VITALS
HEIGHT: 63 IN | WEIGHT: 179 LBS | DIASTOLIC BLOOD PRESSURE: 78 MMHG | BODY MASS INDEX: 31.71 KG/M2 | SYSTOLIC BLOOD PRESSURE: 152 MMHG

## 2025-01-13 DIAGNOSIS — Z79.811 PROPHYLACTIC USE OF LETROZOLE: ICD-10-CM

## 2025-01-13 DIAGNOSIS — Z98.890 S/P LUMPECTOMY, RIGHT BREAST: ICD-10-CM

## 2025-01-13 DIAGNOSIS — Z92.3 HISTORY OF RADIATION THERAPY: ICD-10-CM

## 2025-01-13 DIAGNOSIS — Z78.9 NON-SMOKER: ICD-10-CM

## 2025-01-13 DIAGNOSIS — C50.911 INFILTRATING DUCTAL CARCINOMA OF RIGHT BREAST: Primary | ICD-10-CM

## 2025-01-13 DIAGNOSIS — Z98.890 S/P LYMPH NODE BIOPSY: ICD-10-CM

## 2025-01-13 PROCEDURE — G0463 HOSPITAL OUTPT CLINIC VISIT: HCPCS | Performed by: RADIOLOGY

## 2025-01-16 ENCOUNTER — TELEPHONE (OUTPATIENT)
Dept: SURGERY | Age: 60
End: 2025-01-16

## 2025-01-16 NOTE — TELEPHONE ENCOUNTER
Called patient and gave her the folllowing appt information:  Patient scheduledl tdo have mammogram on 02/14/25 at [facility] at 1:00.  She will see Dr. Cason on 02/18/25 at 1:00.  Patient voiced understanding.

## 2025-02-17 ENCOUNTER — TELEPHONE (OUTPATIENT)
Dept: HEMATOLOGY | Age: 60
End: 2025-02-17

## 2025-02-17 NOTE — TELEPHONE ENCOUNTER
I called patient and left detailed voicemail about their appointment on 02/21/2025. I made patient aware not to arrive any earlier than the appointment time and to come at the time of the follow up not the time of the lab appointment if it is different than the follow up appt time. I also made patient aware to eat a meal (Our labs are not fasting labs) and drink plenty of water to hydrate their veins properly before coming to these appointments because this will make their lab draw much easier. Made patient aware that we are now located at the [facility] at [address]. Located between [facility] and the [facility]. Front entrance faces [place].  I made patient aware that if there was any inclement weather, we would make those calls from our home phones so it may not show up as [facility]. I also made patient aware to check their voicemail on the appt day in case there was a delay in our schedule or in case we were going to be closed.

## 2025-02-21 NOTE — PROGRESS NOTES
Pt Name: Michelle Rodriguez  MRN: [id number]  YOB: 1965  Date of evaluation: 2/21/2025    History Obtained From:  patient and electronic medical record    CHIEF COMPLAINT:  No chief complaint on file.    HISTORY OF PRESENT ILLNESS:  Diagnosis  Infiltrating ductal carcinoma, 9 o'clock right breast, March 2024  ER 83%, ND 64%, HER-2 1+/negative, Ki67 17%  Rob Negative for pathogenetic germline mutations  pT1c, (sn)pN0, pMx       Treatment Summary  4/1/24 Right, breast lumpectomy with 3 SLNB negative by Dr.Alice Cason at   Oncotype Recurrence Score Dx 16  4/24/24 Initiated Letrozole 2.5 mg daily  5/15/24-5/29/24- Completed radiation therapy 4000 cGy to right breast by  at [facility]     HEMATOLOGY/ONCOLOGY HISTORY:  Cancer History  Michelle Rodriguez was first seen by Dr. Garcia.  The patient has a strong family of colon and breast cancer.  The patient undergoes annual screening mammogram.  She had an abnormal screening mammogram.  Further workup consistent with invasive ductal carcinoma of the right breast.  She was referred by Dr. Cason.  2/22/24 Bilateral mammogram ([facility]): Stellate density identified in the upper outer quadrant of the right breast. This is located 10cm from the  nipple on the cc view. Mammographic appearance of the left breast is benign.  2/22/24 Right diagnostic mammogram ([facility]): Confirm the presence of a stellate mass with architectural distortion in the upper outer quadrant of the right breast.  2/22/24 US right breast ([facility]): Oval hypoechoic mass without acoustic shadowing at 9 o'clock in the right breast, 6cm from the nipple.  2/22/24 US right breast/axilla ([facility]): At the 9 o'clock position in the right breast, 6cm from the nipple is a 2.1 x 1.0 x 1.5cm oval hypoechoic mass. Internal blood flow is seen with color Doppler. No acoustic shadowing. This corresponds to the size and approximate location of the stellate mass seen

## 2025-02-24 ENCOUNTER — TELEPHONE (OUTPATIENT)
Age: 60
End: 2025-02-24
Payer: COMMERCIAL

## 2025-02-24 DIAGNOSIS — Z98.890 S/P LYMPH NODE BIOPSY: ICD-10-CM

## 2025-02-24 DIAGNOSIS — Z92.3 HISTORY OF RADIATION THERAPY: ICD-10-CM

## 2025-02-24 DIAGNOSIS — Z98.890 S/P LUMPECTOMY, RIGHT BREAST: ICD-10-CM

## 2025-02-24 DIAGNOSIS — C50.911 INFILTRATING DUCTAL CARCINOMA OF RIGHT BREAST: Primary | ICD-10-CM

## 2025-02-24 NOTE — TELEPHONE ENCOUNTER
"Contacted [facility] to see if patient was scheduled for mammogram at their facility.  They state patient cancelled recent appointment for mammogram because \"she thought she was supposed to have it at the hospital.\"  Mammogram to be scheduled at [facility].  "

## 2025-03-31 ENCOUNTER — TELEPHONE (OUTPATIENT)
Dept: HEMATOLOGY | Age: 60
End: 2025-03-31

## 2025-03-31 NOTE — TELEPHONE ENCOUNTER
I called patient and left detailed voicemail about their appointment on 04/04/2025. I made patient aware not to arrive any earlier than the appointment time and to come at the time of the follow up not the time of the lab appointment if it is different than the follow up appt time. I also made patient aware to eat a meal (Our labs are not fasting labs) and drink plenty of water to hydrate their veins properly before coming to these appointments because this will make their lab draw much easier. Made patient aware that we are now located at the [facility] at [address]. Located between [facility] and the [facility]. Front entrance faces [place].

## 2025-04-03 NOTE — PROGRESS NOTES
Pt Name: Michelle Rodriguez  MRN: [id number]  YOB: 1965  Date of evaluation: 4/4/2025    History Obtained From:  patient and electronic medical record    CHIEF COMPLAINT:    Chief Complaint   Patient presents with    Follow-up     Infiltrating ductal carcinoma of right breast (HCC)  Numbness on middle right finger     HISTORY OF PRESENT ILLNESS:  Diagnosis  Infiltrating ductal carcinoma, 9 o'clock right breast, March 2024  ER 83%, MD 64%, HER-2 1+/negative, Ki67 17%  Rob Negative for pathogenetic germline mutations  pT1c, (sn)pN0, pMx. Stage I      Treatment Summary  4/1/24 Right, breast lumpectomy with 3 SLNB negative by Dr.Alice Cason at   Oncotype Recurrence Score Dx 16  4/24/24 Initiated Letrozole 2.5 mg daily  5/15/24-5/29/24- Completed radiation therapy 4000 cGy to right breast by  at [facility]     History of Present Illness  The patient presents for follow-up regarding a history of stage I infiltrating ductal carcinoma right breast, initially diagnosed 3/2024.    A lumpectomy on the right breast was performed in 04/2024, followed by radiation therapy. Currently, she is on a daily regimen of letrozole 2.5 mg, which is tolerated well with minimal side effects. Occasional night sweats and manageable joint aches are reported. The last mammogram was conducted in 10/2024, with a follow-up scheduled for 04/15/2025. An upcoming appointment with ZIYAD Jean-Baptiste is scheduled for 04/16/2025. A consultation with JESSICA Mota with radiation oncology occurred in 01/2025, and a genetic test with Dr. Garcia in the fall yielded negative results. She has never undergone a bone density test.    A single appointment at the lymphedema clinic was attended, with no subsequent contact from them. No swelling is reported, and there is no perceived need to reach out to the clinic.    A course of antibiotics was completed within the last month for an infection in the thumb. Numbness in the middle right finger,

## 2025-04-04 ENCOUNTER — OFFICE VISIT (OUTPATIENT)
Dept: HEMATOLOGY | Age: 60
End: 2025-04-04
Payer: COMMERCIAL

## 2025-04-04 ENCOUNTER — HOSPITAL ENCOUNTER (OUTPATIENT)
Dept: INFUSION THERAPY | Age: 60
Discharge: HOME | End: 2025-04-04
Payer: COMMERCIAL

## 2025-04-04 ENCOUNTER — RESULTS FOLLOW-UP (OUTPATIENT)
Dept: HEMATOLOGY | Age: 60
End: 2025-04-04

## 2025-04-04 VITALS
HEIGHT: 63 IN | BODY MASS INDEX: 31.06 KG/M2 | HEART RATE: 72 BPM | TEMPERATURE: 97.3 F | OXYGEN SATURATION: 97 % | SYSTOLIC BLOOD PRESSURE: 122 MMHG | DIASTOLIC BLOOD PRESSURE: 80 MMHG | WEIGHT: 175.3 LBS

## 2025-04-04 DIAGNOSIS — Z79.811 ENCOUNTER FOR MONITORING AROMATASE INHIBITOR THERAPY: ICD-10-CM

## 2025-04-04 DIAGNOSIS — C50.911 INFILTRATING DUCTAL CARCINOMA OF RIGHT BREAST: Primary | ICD-10-CM

## 2025-04-04 DIAGNOSIS — C50.911 INFILTRATING DUCTAL CARCINOMA OF RIGHT BREAST: ICD-10-CM

## 2025-04-04 DIAGNOSIS — Z51.81 ENCOUNTER FOR MONITORING AROMATASE INHIBITOR THERAPY: ICD-10-CM

## 2025-04-04 DIAGNOSIS — Z79.899 MEDICATION MANAGEMENT: ICD-10-CM

## 2025-04-04 DIAGNOSIS — Z71.89 CARE PLAN DISCUSSED WITH PATIENT: ICD-10-CM

## 2025-04-04 LAB
ALBUMIN SERPL-MCNC: 4 G/DL (ref 3.5–5.2)
ALP SERPL-CCNC: 63 U/L (ref 35–104)
ALT SERPL-CCNC: 27 U/L (ref 5–33)
ANION GAP SERPL CALCULATED.3IONS-SCNC: 11 MMOL/L (ref 7–19)
AST SERPL-CCNC: 23 U/L (ref 5–32)
BASOPHILS # BLD: 0.02 K/UL (ref 0–0.2)
BASOPHILS NFR BLD: 0.3 % (ref 0–1)
BILIRUB SERPL-MCNC: 0.4 MG/DL (ref 0–1.2)
BUN SERPL-MCNC: 13 MG/DL (ref 6–20)
CALCIUM SERPL-MCNC: 9.3 MG/DL (ref 8.6–10)
CHLORIDE SERPL-SCNC: 101 MMOL/L (ref 98–107)
CO2 SERPL-SCNC: 27 MMOL/L (ref 22–29)
CREAT SERPL-MCNC: 0.7 MG/DL (ref 0.5–0.9)
EOSINOPHIL # BLD: 0.22 K/UL (ref 0–0.6)
EOSINOPHIL NFR BLD: 2.8 % (ref 0–5)
ERYTHROCYTE [DISTWIDTH] IN BLOOD BY AUTOMATED COUNT: 13.2 % (ref 11.5–14.5)
GLUCOSE SERPL-MCNC: 215 MG/DL (ref 70–99)
HCT VFR BLD AUTO: 39.7 % (ref 37–47)
HGB BLD-MCNC: 13.1 G/DL (ref 12–16)
LYMPHOCYTES # BLD: 2.04 K/UL (ref 1.1–4.5)
LYMPHOCYTES NFR BLD: 26.4 % (ref 20–40)
MCH RBC QN AUTO: 28.4 PG (ref 27–31)
MCHC RBC AUTO-ENTMCNC: 33 G/DL (ref 33–37)
MCV RBC AUTO: 86.1 FL (ref 81–99)
MONOCYTES # BLD: 0.69 K/UL (ref 0–0.9)
MONOCYTES NFR BLD: 8.9 % (ref 1–10)
NEUTROPHILS # BLD: 4.71 K/UL (ref 1.5–7.5)
NEUTS SEG NFR BLD: 61.1 % (ref 50–65)
PLATELET # BLD AUTO: 258 K/UL (ref 130–400)
PMV BLD AUTO: 9 FL (ref 9.4–12.3)
POTASSIUM SERPL-SCNC: 4.4 MMOL/L (ref 3.5–5.1)
PROT SERPL-MCNC: 7.3 G/DL (ref 6.4–8.3)
RBC # BLD AUTO: 4.61 M/UL (ref 4.2–5.4)
SODIUM SERPL-SCNC: 139 MMOL/L (ref 136–145)
WBC # BLD AUTO: 7.72 K/UL (ref 4.8–10.8)

## 2025-04-04 PROCEDURE — 99213 OFFICE O/P EST LOW 20 MIN: CPT

## 2025-04-04 PROCEDURE — 80053 COMPREHEN METABOLIC PANEL: CPT

## 2025-04-04 PROCEDURE — G2211 COMPLEX E/M VISIT ADD ON: HCPCS | Performed by: NURSE PRACTITIONER

## 2025-04-04 PROCEDURE — 36415 COLL VENOUS BLD VENIPUNCTURE: CPT

## 2025-04-04 PROCEDURE — 99214 OFFICE O/P EST MOD 30 MIN: CPT | Performed by: NURSE PRACTITIONER

## 2025-04-04 PROCEDURE — 85025 COMPLETE CBC W/AUTO DIFF WBC: CPT

## 2025-04-06 RX ORDER — LETROZOLE 2.5 MG/1
2.5 TABLET, FILM COATED ORAL DAILY
Qty: 90 TABLET | Refills: 3 | Status: SHIPPED | OUTPATIENT
Start: 2025-04-06

## 2025-04-15 ENCOUNTER — HOSPITAL ENCOUNTER (OUTPATIENT)
Dept: WOMENS IMAGING | Age: 60
Discharge: HOME | End: 2025-04-15
Attending: SURGERY
Payer: COMMERCIAL

## 2025-04-15 DIAGNOSIS — C50.911 INFILTRATING DUCTAL CARCINOMA OF RIGHT BREAST: ICD-10-CM

## 2025-04-15 PROCEDURE — G0279 TOMOSYNTHESIS, MAMMO: HCPCS

## 2025-04-16 ENCOUNTER — OFFICE VISIT (OUTPATIENT)
Dept: SURGERY | Age: 60
End: 2025-04-16
Payer: COMMERCIAL

## 2025-04-16 VITALS — BODY MASS INDEX: 31.01 KG/M2 | HEART RATE: 71 BPM | OXYGEN SATURATION: 98 % | HEIGHT: 63 IN | WEIGHT: 175 LBS

## 2025-04-16 DIAGNOSIS — Z85.3 HISTORY OF BREAST CANCER: Primary | ICD-10-CM

## 2025-04-16 PROCEDURE — 99213 OFFICE O/P EST LOW 20 MIN: CPT | Performed by: PHYSICIAN ASSISTANT

## 2025-04-16 NOTE — PROGRESS NOTES
Michelle Rodriguez comes today for her follow-up breast exam.  She has had no new breast complaints.  She reports no new palpable masses.  There is no skin or nipple changes.  There is no nipple discharge.  She has no appreciable evidence of supraclavicular or axillary adenopathy.      Of note, she is s/p right breast partial mastectomy and sln bx from April of 2024 for a grade 2, 1.7 cm, infiltrating ductal carcinoma (T1N0M0). She is on letrozole. She has completed XRT. Negative genetic testing.     Patient Active Problem List    Diagnosis Date Noted    Infiltrating ductal carcinoma of right breast 04/04/2024    Simple endometrial hyperplasia without atypia     PMB (postmenopausal bleeding)     Thickened endometrium     Fatty liver 05/19/2021    Transaminitis 04/02/2021    Heartburn 04/02/2021    Belching 04/02/2021    Environmental allergies 11/02/2018    Gastroesophageal reflux disease without esophagitis 11/02/2018    Family history of colon cancer 04/23/2018    Dizziness 10/03/2017    Otitis media with effusion 10/03/2017    Acute non-recurrent sinusitis 10/03/2016    Mild intermittent asthma without complication 10/03/2016    Vasovagal syncope        Current Outpatient Medications   Medication Sig Dispense Refill    letrozole (FEMARA) 2.5 MG tablet Take 1 tablet by mouth daily 90 tablet 3    glipiZIDE (GLUCOTROL XL) 10 MG extended release tablet Take 1 tablet by mouth daily      ezetimibe (ZETIA) 10 MG tablet Take 1 tablet by mouth daily      escitalopram (LEXAPRO) 5 MG tablet Take 1 tablet by mouth daily      empagliflozin (JARDIANCE) 25 MG tablet Take 1 tablet by mouth daily      loratadine (CLARITIN) 5 MG chewable tablet Take 1 tablet by mouth daily      omeprazole (PRILOSEC) 40 MG delayed release capsule Take 1 capsule by mouth daily      atenolol (TENORMIN) 25 MG tablet Take 1 tablet by mouth daily 30 tablet 11    albuterol sulfate (PROAIR RESPICLICK) 108 (90 Base) MCG/ACT aerosol powder inhalation Inhale 2

## 2025-07-09 PROBLEM — Z98.890 S/P LYMPH NODE BIOPSY: Status: ACTIVE | Noted: 2025-07-09

## 2025-07-09 NOTE — PROGRESS NOTES
[facility]  Radiation Oncology Clinic   Tal Hsu MD, FACR  Sandip BAIN  _______________________________________________  [facility]  Department of Radiation Oncology  [address] [place] [id number]  Office: [phone]  Fax: [phone]    DATE: 07/14/2025  PATIENT: Herminia Galindo  1965                         MEDICAL RECORD #: [id number]    1. Infiltrating ductal carcinoma of right breast    2. S/P lumpectomy, right breast    3. S/P lymph node biopsy    4. History of radiation therapy    5. Prophylactic use of letrozole    6. Non-smoker                                               REASON FOR VISIT:    Chief Complaint   Patient presents with    Breast Cancer     Herminia Galindo is a very pleasant 59 y.o. patient that has completed radiation therapy for carcinoma of the breast and returns to the clinic today for oncologic surveillance .    HISTORY OF PRESENT ILLNESS  Diagnosed with Stage IA (T1c (sn)N0 M0), ER positive, OK positive, HER2 negative, grade 2, infiltrating ductal carcinoma the right breast. She completed 4000 cGy in 10 fractions to the right breast on 05/29/2024.     02/22/2024 - Bilateral Screening Mammogram:  Findings:   Stellate density identified in the upper outer quadrant of the right breast. This is located 10 cm from the nipple on the cc view.   A right diagnostic mammogram performed consisting of spot compression views. Spot compression views confirm the presence of a stellate mass with architectural distortion in the upper outer quadrant of the right breast.   A targeted right breast ultrasound demonstratesan oval hypoechoic mass without acoustic shadowing at 9:00 in the right breast, 6 cm from the nipple. On ultrasound this mass measures 2.1 x 1.0 cm  Mammographic appearance of the left breast is benign  Recommend:   Percutaneous ultrasound-guided biopsy of the hypoechoic mass located laterally in the right  breast, 9:00 by ultrasound.    03/08/2024 -  Breast, right breast needle core biopsies at 9 o'clock position:   Infiltrating ductal carcinoma, no special type, favor grade 2.   Infiltrating carcinoma measures 1.2 cm in greatest linear dimension and is present in multiple cores.   ER, IN +    03/18/2024 - MRI Bilateral Breasts with and without contrast:  Biopsy-proven malignancy in the right breast measures up to 1.9 cm as described above.   No MRI evidence of malignancy in the left breast.   Small hiatal hernia with thickening and some irregularity of the distal esophagus. Consider further evaluation with endoscopy.   BI-RADS 6 - KNOWN MALIGNANCY     03/20/2024 - Appointment with :  Infiltrating ductal carcinoma, 9 o'clock right breast, Favor grade 2, ER 83%, IN 64%, HER-2 1+/negative, Ki67 17%, March 2024  The patient was counseled today about diagnosis, staging, prognosis, diagnostic tests, medications, side effects and disease management.   Essentially, clinical T1 N0 M0, IDC right breast ER positive, IN positive, HER2 negative  Node-negative  I recommend to proceed with lumpectomy/sentinel lymph node biopsy  Anticipate Oncotype DX for further decision regarding adjuvant chemotherapy  Anticipate adjuvant endocrine therapy and refer to radiation oncology during next visit.  Genetic assessment-family history of breast cancer  Follow-up 3/12/24 [employer] genetic test results, in process  PLAN:  RTC with MD in 4 weeks  Follow-up 3/12/24 [employer] genetic test results  Continue follow-up with Dr Tasia Mays/[facility],3/26/24  Refer to /[facility] around May 2024 to consider repeat colonoscopy  Will request Oncotype Dx on specimen from surgery  Discussed care plan with Dr. Mays  Proceed with lumpectomy with Dr.Alice Mays/[facility]  Follow Up:   Return in 4 weeks (on 4/17/2024) for CBC, Appointment with Dr. Tran.  Refer to /[facility] around May 2024    04/01/2024 -  [facility]:  Negative for 81/81 genes    04/01/2024 -  Right breast lumpectomy and lymph node biopsy per :  Breast, right lumpectomy:   Infiltrating ductal carcinoma, no special type, grade 2.   Infiltrating carcinoma measures 1.7 cm in greatest dimension.   Infiltrating carcinoma is located 0.4 cm from cranial and 0.2 cm caudal margins.   Intermediate grade ductal carcinoma in situ is identified in conjunction with the invasive lesion.   In situ carcinoma is located greater than 1.0 cm from all surgical excision margins.   Focal changes consistent with fibroadenomatoid mastopathy involving nonneoplastic breast parenchyma.   Changes consistent with fibrocystic mastopathy identified involving nonneoplastic breast parenchyma.   Sections of skin, negative for evidence of malignancy.   Breast, excision of additional right breast deep margin:   Benign breast parenchyma with changes consistent with fibrocystic mastopathy and dense hyalinized fibrosis.   Breast, excision of additional right breast cranial margin:   Benign breast parenchyma with dense fibrosis.   Breast, excision of additional right breast caudal margin:   Benign breast parenchyma with changes consistent with fibrocystic mastopathy.   Lymph node, right sentinel lymph node biopsy:   3 lymph nodes, negative for evidence of malignancy.   AJCC STAGE:  pT1c, (sn)pN0, pMx     04/16/2024 - Appointment with :  Assessment  Doing well postoperatively  Plan  Continue any current medications  Wound care discussed  Pt is to increase activities as tolerated  Usual diet  Follow up: three months with me. F/u with radiation oncology. F/u with oncology.     04/24/2024 - Appointment with Dr. Tran:  Infiltrating ductal carcinoma, 9 o'clock right breast, Favor grade 2, ER 83%, VA 64%, HER-2 1+/negative, Ki67 17%, March 2024  Essentially, clinical T1 N0 M0, IDC right breast ER positive, VA positive, HER2 negative.   Node-negative  S/p  lumpectomy/sentinel lymph node biopsy  [id number], stage I  Oncotype DX = 16  I do not recommend adjuvant chemotherapy based on NCCN guidelines.  Recommended adjuvant endocrine therapy with letrozole x 5 years  Recommended consultation with radiation oncology for consideration of adjuvant RT  Plan:  Adjuvant endocrine therapy with letrozole  Referral to radiation oncology   PLAN:  RTC with MD in 4 months  Referral to Dr. Adame for radiation consideration   Referral to Lymphedema Clinic for therapy  Recommend adjuvant endocrine therapy letrozole 2.5 mg daily-script sent   Continue follow-up with Dr Tasia Mays/[facility],7/16/24  Refer to /[facility] around May 2024 to consider repeat colonoscopy  Discussed results of Oncotype Dx  Discussed side effects of letrozole and management  Follow Up:   Return in about 4 months (around 8/24/2024) for Appointment with Dr. Tran.  Referral to Dr. Adame  Referral to Lymphedema Clinic    04/24/2024 - Hormonal Therapy course:  Letrozole    05/08/2024 - Consult with :  Given the patient's age, tumor size and histology, she appears to be a candidate for accelerated partial breast irradiation (APBI).  I anticipate a dose of  4000 cGy in 10 fractions over 2 weeks .  After CT simulation, should we find that the patient is not a candidate for APBI, we will plan to treat with a moderate hypofractionated schedule of a total of 5256 cGy in 20 fractions over 4 weeks.  We will simulate treatment fields tomorrow with plans to begin the treatment planning, final dose to be determined.  We discussed post surgery and radiation risk for lymphedema, referral has already been placed to lymphedema clinic for initial evaluation/education, she will continue ongoing management per primary care physician and other specialists.      05/15/2024 - 05/29/2024 - Completed radiation course:  Received 4000 cGy in 10 fractions to the right breast.     07/11/2024 - Appointment with  ODELL Ayers - radiation therapy:  Mammograms per Dr. Mays  Return in 6 months    08/21/2024 - Appointment with :  Infiltrating ductal carcinoma, 9 o'clock right breast, Favor grade 2, ER 83%, AK 64%, HER-2 1+/negative, Ki67 17%, March 2024  Essentially, clinical T1 N0 M0, IDC right breast ER positive, AK positive, HER2 negative.   Node-negative  S/p lumpectomy/sentinel lymph node biopsy  [id number], stage I  Oncotype DX = 16  I do not recommend adjuvant chemotherapy based on NCCN guidelines.  Recommended adjuvant endocrine therapy with letrozole x 5 years  Plan:  Adjuvant endocrine therapy with letrozole  Referral to radiation oncology   PLAN:  RTC with Rolanda in 6 months   Continue adjuvant endocrine therapy letrozole 2.5 mg daily   Continue follow-up with Dr Tasia Mays/[facility]  Continue follow-up with ODELL Jimenez/[facility], 1/13/25  Continue follow-up with Lymphedema Clinic for therapy  Follow Up:   Return in about 6 months (around 2/21/2025) for NO LABS, Appointment with ODELL Antoine.  Data Unavailable    10/22/2024 - Appointment with :  PLAN:  She will be due for bilateral diagnostic mammogram in February of 2025. Will see her after this for repeat exam.  Continue letrozole. Continue oncology follow-up.   Encouraged to call with any questions.  Return in about 4 months (around 2/22/2025).    10/18/2024 - Right breast mammogram:  Postoperative change of the right breast with no residual mass noted.   RECOMMENDATION:   Annual screening mammogram   BIRADS category  2: Benign findings     01/13/2025 - Appointment with ODELL Ayers - Radiation oncology:  Due for bilateral mammogram in February - [facility]  return in 6 months     04/04/2025 - Appointment with ODELL Centeno - Oncology:  PLAN:  CBC, CMP today. Results reviewed with patient  Continue adjuvant endocrine therapy letrozole 2.5 mg daily   Keep scheduled  appointment for bilateral mammograms 4/15/2025 - follow-up results  Continue follow-up with Nate Alfred PA-C with [facility] 4/16/2025  Continue follow-up with ODELL Jimenez/[facility]  Keep apt for NCS for right middle finger numbness  Follow-up with me in 6 months    04/15/2025 - Bilateral mammogram:  BI-RADS 2 - Benign  RECOMMENDATION: Recommend patient return in 1 year for annual screening mammogram.    04/16/2025 - Appointment with Nate Alfred PA-C:  We have her see Dr Mays back next year for yearly exam and mammography    10/06/2025 - Scheduled appointment with ODELL Centeno - Oncology:    04/16/2026 - Scheduled appointment with Nate Alfred PA-C:      History obtained from  PATIENT and CHART    PAST MEDICAL HISTORY  Past Medical History:   Diagnosis Date    Breast cancer     Depression     Diabetes mellitus     GERD (gastroesophageal reflux disease)     Hyperlipidemia       PAST SURGICAL HISTORY  Past Surgical History:   Procedure Laterality Date    CHOLECYSTECTOMY      COLONOSCOPY  04/23/2018    HYSTERECTOMY      TONSILLECTOMY AND ADENOIDECTOMY        FAMILY HISTORY  family history is not on file.    SOCIAL HISTORY  Social History     Tobacco Use    Smoking status: Never    Smokeless tobacco: Never   Substance Use Topics    Alcohol use: Yes     Comment: occ    Drug use: Not Currently      ALLERGIES  Patient has no known allergies.     MEDICATIONS  Current Outpatient Medications   Medication Sig Dispense Refill    atenolol (TENORMIN) 25 MG tablet Take 1 tablet by mouth Daily.      cholecalciferol (VITAMIN D3) 1.25 MG (94090 UT) capsule Take 1 capsule by mouth Every 7 (Seven) Days.      empagliflozin (Jardiance) 25 MG tablet tablet Take  by mouth Daily.      escitalopram (LEXAPRO) 5 MG tablet Take 1 tablet by mouth Daily.      ezetimibe (ZETIA) 10 MG tablet Take 1 tablet by mouth Daily.      glipizide (GLUCOTROL) 10 MG tablet Take 1 tablet by mouth 2 (Two) Times a Day  "Before Meals.      Iron-Vitamin C (Vitron-C)  MG tablet Take 1 tablet by mouth Daily.      letrozole (FEMARA) 2.5 MG tablet Take 1 tablet by mouth Daily.      loratadine (CLARITIN) 10 MG tablet Take 1 tablet by mouth Daily.      metFORMIN ER (GLUCOPHAGE-XR) 500 MG 24 hr tablet Take 1 tablet by mouth Daily With Breakfast.      omeprazole (priLOSEC) 40 MG capsule Take 1 capsule by mouth Daily.       No current facility-administered medications for this visit.      The following portions of the patient's history were reviewed and updated as appropriate: allergies, current medications, past family history, past medical history, past social history, past surgical history and problem list.    REVIEW OF SYSTEMS  Review of Systems   Constitutional: Negative.    HENT: Negative.     Eyes: Negative.         Glasses   Respiratory: Negative.     Cardiovascular: Negative.    Gastrointestinal: Negative.    Endocrine: Positive for heat intolerance (night sweats related to letrozole).   Genitourinary: Negative.    Musculoskeletal:  Positive for arthralgias (r/t letrozole).   Skin: Negative.    Allergic/Immunologic: Negative.    Neurological: Negative.    Hematological: Negative.    Psychiatric/Behavioral: Negative.         PHYSICAL EXAM  VITAL SIGNS:   Vitals:    07/14/25 0905   BP: 127/65   Weight: 77.6 kg (171 lb)   Height: 160 cm (63\")   PainSc: 0-No pain        Physical Exam  Vitals reviewed.   Constitutional:       Appearance: Normal appearance.   HENT:      Head: Normocephalic.      Nose: Nose normal.   Eyes:      Pupils: Pupils are equal, round, and reactive to light.   Cardiovascular:      Rate and Rhythm: Normal rate and regular rhythm.      Pulses: Normal pulses.      Heart sounds: Normal heart sounds.   Pulmonary:      Effort: Pulmonary effort is normal. No respiratory distress.      Breath sounds: Normal breath sounds. No wheezing.   Chest:      Comments: Right breast s/p lumpectomy, radiation therapy. Well-healed, " no palpable masses noted.  Abdominal:      General: Bowel sounds are normal.      Palpations: There is no mass.   Musculoskeletal:         General: Normal range of motion.      Cervical back: Normal range of motion and neck supple. No tenderness.   Lymphadenopathy:      Cervical: No cervical adenopathy.      Upper Body:      Right upper body: No supraclavicular, axillary or pectoral adenopathy.      Left upper body: No supraclavicular, axillary or pectoral adenopathy.   Skin:     General: Skin is warm and dry.      Capillary Refill: Capillary refill takes less than 2 seconds.   Neurological:      General: No focal deficit present.      Mental Status: She is alert and oriented to person, place, and time.      Motor: No weakness.   Psychiatric:         Mood and Affect: Mood normal.         Behavior: Behavior normal.         Performance Status: ECOG (0) Fully active, able to carry on all predisease performance without restriction    Clinical Quality Measures  - Pain Documented by Standardized Tool, FPS   Herminia Galindo reports a pain score of 0.  Given her pain assessment as noted, treatment options were discussed and the following options were decided upon as a follow-up plan to address the patient's pain: continuation of current treatment plan for pain.    - Body Mass Index Screening and Follow-Up Plan  BMI is >= 30 and <35. (Class 1 Obesity). The following options were offered after discussion;: referral to primary care    - Tobacco Use: Screening and Cessation Intervention  Social History    Tobacco Use      Smoking status: Never      Smokeless tobacco: Never    - Advanced Care Planning Advance Care Planning   ACP discussion was held with the patient during this visit. Patient does not have an advance directive, information provided.    - PHQ-2 Depression Screening:  Little interest or pleasure in doing things? Not at all   Feeling down, depressed, or hopeless? Not at all   PHQ-2 Total Score 0     ASSESSMENT AND  PLAN  1. Infiltrating ductal carcinoma of right breast    2. S/P lumpectomy, right breast    3. S/P lymph node biopsy    4. History of radiation therapy    5. Prophylactic use of letrozole    6. Non-smoker      No orders of the defined types were placed in this encounter.    Recommendations: Herminia Galindo is status post completion of adjuvant radiation therapy to the right breast and presents to our clinic today for oncological surveillance Diagnosed with Stage IA (T1c (sn)N0 M0), E/GA+, grade 2, infiltrating ductal carcinoma the right breast. She completed 4000 cGy in 10 fractions to the right breast on 05/29/2024.     We discussed expected post radiation long and short term effects, monthly self exams and NCCN surveillance recommendations. She is doing well and has no evidence of recurrent or metastatic disease today. She continues on letrozole. Her bilateral mammogram in April revealed benign findings. She is due for a bilateral mammogram in April 2026.     Continue ongoing management with other physicians, will follow up with us in 6 months or sooner if needed.    Patient Instructions   1) return in 6 months     Return in about 6 months (around 1/14/2026).     Time Spent: I spent 44 minutes caring for Herminia on this date of service. This time includes time spent by me in the following activities: preparing for the visit, reviewing tests, obtaining and/or reviewing a separately obtained history, performing a medically appropriate examination and/or evaluation, counseling and educating the patient/family/caregiver, ordering medications, tests, or procedures, referring and communicating with other health care professionals, documenting information in the medical record, independently interpreting results and communicating that information with the patient/family/caregiver, and care coordination.   Sandip Lepe, APRN  07/14/2025

## 2025-07-11 ENCOUNTER — HOSPITAL ENCOUNTER (OUTPATIENT)
Dept: RADIATION ONCOLOGY | Facility: HOSPITAL | Age: 60
Setting detail: RADIATION/ONCOLOGY SERIES
End: 2025-07-11
Payer: COMMERCIAL

## 2025-07-14 ENCOUNTER — OFFICE VISIT (OUTPATIENT)
Age: 60
End: 2025-07-14
Payer: COMMERCIAL

## 2025-07-14 VITALS
HEIGHT: 63 IN | DIASTOLIC BLOOD PRESSURE: 65 MMHG | SYSTOLIC BLOOD PRESSURE: 127 MMHG | WEIGHT: 171 LBS | BODY MASS INDEX: 30.3 KG/M2

## 2025-07-14 DIAGNOSIS — Z98.890 S/P LUMPECTOMY, RIGHT BREAST: ICD-10-CM

## 2025-07-14 DIAGNOSIS — Z92.3 HISTORY OF RADIATION THERAPY: ICD-10-CM

## 2025-07-14 DIAGNOSIS — Z78.9 NON-SMOKER: ICD-10-CM

## 2025-07-14 DIAGNOSIS — C50.911 INFILTRATING DUCTAL CARCINOMA OF RIGHT BREAST: Primary | ICD-10-CM

## 2025-07-14 DIAGNOSIS — Z79.811 PROPHYLACTIC USE OF LETROZOLE: ICD-10-CM

## 2025-07-14 DIAGNOSIS — Z98.890 S/P LYMPH NODE BIOPSY: ICD-10-CM

## 2025-07-14 PROCEDURE — G0463 HOSPITAL OUTPT CLINIC VISIT: HCPCS | Performed by: RADIOLOGY

## 2025-07-14 RX ORDER — GLIPIZIDE 10 MG/1
10 TABLET ORAL
COMMUNITY

## 2025-07-14 RX ORDER — METFORMIN HYDROCHLORIDE 500 MG/1
500 TABLET, EXTENDED RELEASE ORAL
COMMUNITY

## 2025-07-14 RX ORDER — IRON,CARBONYL/ASCORBIC ACID 65MG-125MG
1 TABLET ORAL DAILY
COMMUNITY

## (undated) DEVICE — ENDO KIT,LOURDES HOSPITAL: Brand: MEDLINE INDUSTRIES, INC.

## (undated) DEVICE — UNDERGLOVE SURG SZ 8 FNGR THK0.21MIL GRN LTX BEAD CUF

## (undated) DEVICE — 40595 XL TRENDELENBURG POSITIONING KIT: Brand: 40595 XL TRENDELENBURG POSITIONING KIT

## (undated) DEVICE — SUTURE MCRYL + SZ 4-0 L18IN ABSRB UD L19MM PS-2 3/8 CIR MCP496G

## (undated) DEVICE — SUTURE MCRYL SZ 4-0 L18IN ABSRB UD L19MM PS-2 3/8 CIR PRIM Y496G

## (undated) DEVICE — APPLIER CLP L9.38IN M LIG TI DISP STR RNG HNDL LIGACLP

## (undated) DEVICE — SOLUTION IV IRRIG POUR BRL 0.9% SODIUM CHL 2F7124

## (undated) DEVICE — CANNULA NSL AD L7FT DIV O2 CO2 W/ M LUERLOCK TRMPT CONN

## (undated) DEVICE — PACK,UNIVERSAL,NO GOWNS: Brand: MEDLINE

## (undated) DEVICE — SUTURE VCRL SZ 2-0 L36IN ABSRB UD L36MM CT-1 1/2 CIR J945H

## (undated) DEVICE — SOLUTION IV 1000ML 0.9% SOD CHL FOR IRRIG PLAS CONT

## (undated) DEVICE — GLOVE SURG SZ 75 CRM LTX FREE POLYISOPRENE POLYMER BEAD ANTI

## (undated) DEVICE — TUBE ET 7.5MM NSL ORAL BASIC CUF INTMED MURPHY EYE RADPQ

## (undated) DEVICE — VESSEL SEALER EXTEND: Brand: ENDOWRIST

## (undated) DEVICE — 3 ML SYRINGE WITH HYPODERMIC SAFETY NEEDLE: Brand: MAGELLAN

## (undated) DEVICE — SUTURE PERMAHAND SZ 2-0 L18IN NONABSORBABLE BLK L26MM FS 685G

## (undated) DEVICE — SOLUTION IV IRRIG WATER 1000ML POUR BRL 2F7114

## (undated) DEVICE — PUMP SUC IRR TBNG L10FT W/ HNDPC ASSEMB STRYKEFLOW 2

## (undated) DEVICE — TRAP POLYP ETRAP

## (undated) DEVICE — SINGLE PORT MANIFOLD: Brand: NEPTUNE 2

## (undated) DEVICE — SPECIMEN ORIENTATION CHARMS, SIX DISTINCTLY SHAPED STERILE 10MM CHARMS: Brand: MARGINMAP

## (undated) DEVICE — NEEDLE INSUF L150MM DIA2MM DISP FOR PNEUMOPERI ENDOPATH

## (undated) DEVICE — SUPPLEMENT DIGESTIVE H2O SOL GI-EASE

## (undated) DEVICE — Z DISC USE 2220190 SUTURE VCRL SZ 3-0 L27IN ABSRB UD L26MM SH 1/2 CIR J416H

## (undated) DEVICE — MARKER,SKIN,WI/RULER AND LABELS: Brand: MEDLINE

## (undated) DEVICE — SURGICAL PROCEDURE PACK GYNECOLOGIC MIN LOURDES HOSP

## (undated) DEVICE — SUTURE VCRL + SZ 3-0 L18IN ABSRB UD SH 1/2 CIR TAPERCUT NDL VCP864D

## (undated) DEVICE — SUTURE VCRL + SZ 2-0 L36IN ABSRB UD L36MM CT-1 1/2 CIR VCP945H

## (undated) DEVICE — CANNULA SEAL

## (undated) DEVICE — APPLICATOR LAP 35 CM 2 RIGID VISTASEAL

## (undated) DEVICE — SYRINGE MED 10ML TRNSLUC BRL PLUNG BLK MRK POLYPR CTRL

## (undated) DEVICE — FLUID MGMT SYS FLUENT KIT 6/PK

## (undated) DEVICE — BLANKET WRM W40.2XL55.9IN IORT LO BODY + MISTRAL AIR

## (undated) DEVICE — Device

## (undated) DEVICE — COVER LT HNDL BLU PLAS

## (undated) DEVICE — GOWN, ORBIS, XLONG/XLARGE, STERILE: Brand: MEDLINE

## (undated) DEVICE — ELECTRODE ELECSURG 2 PLATE AD 10 FT 33 LB PT RET MEGADYNE

## (undated) DEVICE — Y-TYPE TUR/BLADDER IRRIGATION SET, REGULATING CLAMP

## (undated) DEVICE — SEALANT TISS 10 CC FIBRIN VISTASEAL

## (undated) DEVICE — CLEANING SPONGE: Brand: KOALA™

## (undated) DEVICE — AIRSEAL 8 MM ACCESS PORT AND LOW PROFILE OBTURATOR WITH BLADELESS OPTICAL TIP, 120 MM LENGTH: Brand: AIRSEAL

## (undated) DEVICE — ADAPTER CLEANING PORPOISE CLEANING

## (undated) DEVICE — TIP COVER ACCESSORY

## (undated) DEVICE — SUTURE DEV SZ 2-0 WND CLSR ABSRB GS-22 VLOC COVIDIEN VLOCM2145

## (undated) DEVICE — FORCEPS BX 240CM 2.4MM L NDL RAD JAW 4 M00513334

## (undated) DEVICE — DEVICE TISS REM IU CANSTR VAC TB FT PEDAL DISPOSABLE MYOSURE

## (undated) DEVICE — SET ENDOSCP SEAL HYSTEROSCOPE RIG OUTFLO CHN DISP MYOSURE

## (undated) DEVICE — SUTURE VCRL SZ 0 L36IN ABSRB UD L36MM CT-1 1/2 CIR J946H

## (undated) DEVICE — PROVE COVER: Brand: UNBRANDED

## (undated) DEVICE — GAUZE,SPONGE,FLUFF,6"X6.75",STRL,10/TRAY: Brand: MEDLINE

## (undated) DEVICE — ADHESIVE SKIN CLOSURE WND 8.661X1.5 IN 22 CM LIQUIBAND SECUR

## (undated) DEVICE — NEPTUNE E-SEP SMOKE EVACUATION PENCIL, COATED, 70MM BLADE, ROCKER SWITCH: Brand: NEPTUNE E-SEP

## (undated) DEVICE — BLADE LARYNSCP SZ 3 GVL STAT DISP FOR ADV VID LARYNGOSCOPY

## (undated) DEVICE — MAJOR CDS

## (undated) DEVICE — BRUSH ENDOSCP 2 END CHN HEDGEHOG

## (undated) DEVICE — ADHESIVE SKIN CLSR 0.7ML TOP DERMBND ADV

## (undated) DEVICE — SUTURE PERMAHAND SZ 2-0 L18IN NONABSORBABLE BLK L26MM SH C012D

## (undated) DEVICE — SUTURE MNCRYL STRATAFIX PS 4-0 30CM

## (undated) DEVICE — TRAP,MUCUS SPECIMEN,40CC: Brand: MEDLINE

## (undated) DEVICE — GLOVE SURG SZ 7 CRM LTX FREE POLYISOPRENE POLYMER BEAD ANTI

## (undated) DEVICE — COVER US PRB W5XL96IN LTX W/ GEL

## (undated) DEVICE — TOWEL,OR,DSP,ST,BLUE,DLX,4/PK,20PK/CS: Brand: MEDLINE

## (undated) DEVICE — DAVINCI: Brand: MEDLINE INDUSTRIES, INC.

## (undated) DEVICE — TRI-LUMEN FILTERED TUBE SET WITH ACTIVATED CHARCOAL FILTER: Brand: AIRSEAL

## (undated) DEVICE — CUFF BLD PRESSURE 1 TUBE AD 25-34 CM ARM VLY FLEXIPORT DISP

## (undated) DEVICE — Z INACTIVE USE 2660664 SOLUTION IRRIG 3000ML 0.9% SOD CHL USP UROMATIC PLAS CONT

## (undated) DEVICE — GOWN,PRECEPT,XLNG/XXLARGE,STRL: Brand: MEDLINE

## (undated) DEVICE — VCARE MEDIUM, UTERINE MANIPULATOR, VAGINAL-CERVICAL-AHLUWALIA'S-RETRACTOR-ELEVATOR: Brand: VCARE

## (undated) DEVICE — SHEET,DRAPE,53X77,STERILE: Brand: MEDLINE

## (undated) DEVICE — INTENDED TO AID IN THE PASSING OF SUTURES THROUGH BONE AND SOFT TISSUE DURING ORTHOPEDIC SURGERY: Brand: HOFFEE SUTURE RETRIEVER

## (undated) DEVICE — GOWN,PREVENTION PLUS,XLN/XL,ST,24/CS: Brand: MEDLINE

## (undated) DEVICE — AMBU AURA-I U SIZE 3, DISPOSABLE LARYNGEAL MASK: Brand: AURA-I

## (undated) DEVICE — SNARE POLYP SM W13MMXL240CM SHTH DIA2.4MM OVL FLX DISP

## (undated) DEVICE — DRESSING TRNSPAR W2XL2.75IN FLM SHT SEMIPERMEABLE WIND

## (undated) DEVICE — HYDROGEL COATED LATEX FOLEY CATHETER, 5 CC, 2-WAY, 16 FR (5.3 MM): Brand: DOVER

## (undated) DEVICE — BLADELESS OBTURATOR: Brand: WECK VISTA

## (undated) DEVICE — ARM DRAPE

## (undated) DEVICE — HYPODERMIC SAFETY NEEDLE: Brand: MAGELLAN